# Patient Record
Sex: FEMALE | Race: WHITE | Employment: FULL TIME | URBAN - METROPOLITAN AREA
[De-identification: names, ages, dates, MRNs, and addresses within clinical notes are randomized per-mention and may not be internally consistent; named-entity substitution may affect disease eponyms.]

---

## 2022-03-18 PROBLEM — T74.91XA DOMESTIC VIOLENCE OF ADULT: Status: ACTIVE | Noted: 2021-04-14

## 2022-03-19 PROBLEM — N89.8 VAGINAL DISCHARGE: Status: ACTIVE | Noted: 2021-12-13

## 2022-03-19 PROBLEM — A63.0 CONDYLOMA ACUMINATA: Status: ACTIVE | Noted: 2017-08-01

## 2022-03-19 PROBLEM — R82.90 ABNORMAL URINE ODOR: Status: ACTIVE | Noted: 2021-12-13

## 2022-03-20 PROBLEM — K35.80 ACUTE APPENDICITIS: Status: ACTIVE | Noted: 2021-05-13

## 2022-03-20 PROBLEM — Z01.419 ENCOUNTER FOR ANNUAL ROUTINE GYNECOLOGICAL EXAMINATION: Status: ACTIVE | Noted: 2021-12-13

## 2022-07-14 PROBLEM — T83.32XA IUD STRINGS LOST: Status: ACTIVE | Noted: 2022-07-14

## 2022-07-14 PROBLEM — N94.89 UTERINE CRAMPING: Status: ACTIVE | Noted: 2022-07-14

## 2022-07-14 PROBLEM — R82.90 ABNORMAL URINE ODOR: Status: RESOLVED | Noted: 2021-12-13 | Resolved: 2022-07-14

## 2022-07-14 PROBLEM — N89.8 VAGINAL DISCHARGE: Status: RESOLVED | Noted: 2021-12-13 | Resolved: 2022-07-14

## 2022-08-24 DIAGNOSIS — O36.80X0 PREGNANCY WITH INCONCLUSIVE FETAL VIABILITY, SINGLE OR UNSPECIFIED FETUS: ICD-10-CM

## 2022-08-24 PROBLEM — N94.89 UTERINE CRAMPING: Status: RESOLVED | Noted: 2022-07-14 | Resolved: 2022-08-24

## 2022-08-24 LAB
ABO + RH BLD: NORMAL
BLOOD GROUP ANTIBODIES SERPL: NORMAL
ERYTHROCYTE [DISTWIDTH] IN BLOOD BY AUTOMATED COUNT: 12.7 % (ref 11.9–14.6)
HCG SERPL-ACNC: 5111 MIU/ML (ref 0–6)
HCT VFR BLD AUTO: 40.6 % (ref 35.8–46.3)
HGB BLD-MCNC: 13.2 G/DL (ref 11.7–15.4)
MCH RBC QN AUTO: 29.9 PG (ref 26.1–32.9)
MCHC RBC AUTO-ENTMCNC: 32.5 G/DL (ref 31.4–35)
MCV RBC AUTO: 92.1 FL (ref 79.6–97.8)
NRBC # BLD: 0 K/UL (ref 0–0.2)
PLATELET # BLD AUTO: 224 K/UL (ref 150–450)
PMV BLD AUTO: 11.4 FL (ref 9.4–12.3)
PROGEST SERPL-MCNC: 10.08 NG/ML
RBC # BLD AUTO: 4.41 M/UL (ref 4.05–5.2)
WBC # BLD AUTO: 8.6 K/UL (ref 4.3–11.1)

## 2022-08-26 DIAGNOSIS — O36.80X0 PREGNANCY WITH INCONCLUSIVE FETAL VIABILITY, SINGLE OR UNSPECIFIED FETUS: ICD-10-CM

## 2022-08-27 LAB — HCG SERPL-ACNC: 8019 MIU/ML (ref 0–6)

## 2022-08-29 DIAGNOSIS — Z34.90 EARLY STAGE OF PREGNANCY: ICD-10-CM

## 2022-08-29 LAB — HCG SERPL-ACNC: ABNORMAL MIU/ML (ref 0–6)

## 2022-09-01 DIAGNOSIS — Z34.81 MULTIGRAVIDA IN FIRST TRIMESTER: ICD-10-CM

## 2022-09-01 PROBLEM — K35.80 ACUTE APPENDICITIS: Status: RESOLVED | Noted: 2021-05-13 | Resolved: 2022-09-01

## 2022-09-01 PROBLEM — T74.91XA DOMESTIC VIOLENCE OF ADULT: Status: RESOLVED | Noted: 2021-04-14 | Resolved: 2022-09-01

## 2022-09-01 PROBLEM — A63.0 CONDYLOMA ACUMINATA: Status: RESOLVED | Noted: 2017-08-01 | Resolved: 2022-09-01

## 2022-09-01 PROBLEM — Z01.419 ENCOUNTER FOR ANNUAL ROUTINE GYNECOLOGICAL EXAMINATION: Status: RESOLVED | Noted: 2021-12-13 | Resolved: 2022-09-01

## 2022-09-01 PROBLEM — O26.31: Status: ACTIVE | Noted: 2022-08-24

## 2022-09-01 LAB
HBV SURFACE AG SER QL: NONREACTIVE
HCV AB SER QL: NONREACTIVE
RUBV IGG SERPL IA-ACNC: 101.2 IU/ML (ref 0–50)

## 2022-09-02 LAB
AMPHET UR QL SCN: NEGATIVE
BARBITURATES UR QL SCN: NEGATIVE
BENZODIAZ UR QL: NEGATIVE
CANNABINOIDS UR QL SCN: NEGATIVE
COCAINE UR QL SCN: NEGATIVE
EST. AVERAGE GLUCOSE BLD GHB EST-MCNC: 91 MG/DL
HBA1C MFR BLD: 4.8 % (ref 4.8–5.6)
HIV 1+2 AB+HIV1 P24 AG SERPL QL IA: NONREACTIVE
HIV 1/2 RESULT COMMENT: NORMAL
METHADONE UR QL: NEGATIVE
OPIATES UR QL: NEGATIVE
PCP UR QL: NEGATIVE
RPR SER QL: NONREACTIVE

## 2022-09-05 LAB
HGB A MFR BLD: 97.7 % (ref 96.4–98.8)
HGB A2 MFR BLD COLUMN CHROM: 2.3 % (ref 1.8–3.2)
HGB F MFR BLD: 0 % (ref 0–2)
HGB FRACT BLD-IMP: NORMAL
HGB S MFR BLD: 0 %

## 2022-09-29 PROBLEM — Z87.59 HISTORY OF DELIVERY OF MACROSOMAL INFANT: Status: ACTIVE | Noted: 2022-09-29

## 2022-10-04 PROBLEM — N89.8 VAGINAL DISCHARGE DURING PREGNANCY IN FIRST TRIMESTER: Status: ACTIVE | Noted: 2022-10-04

## 2022-10-04 PROBLEM — O26.891 VAGINAL DISCHARGE DURING PREGNANCY IN FIRST TRIMESTER: Status: ACTIVE | Noted: 2022-10-04

## 2022-10-26 ENCOUNTER — TELEPHONE (OUTPATIENT)
Dept: OBGYN CLINIC | Age: 22
End: 2022-10-26

## 2022-10-26 RX ORDER — FLUCONAZOLE 150 MG/1
150 TABLET ORAL
Qty: 2 TABLET | Refills: 0 | Status: SHIPPED | OUTPATIENT
Start: 2022-10-26 | End: 2022-10-30

## 2022-10-26 NOTE — TELEPHONE ENCOUNTER
Patient sent message below:    Hi , I have used all of my cream and a week of monistat as suggested by Amrita and it hasnt went fully away yet. It keeps occuring. Amrita said that once Im in my second trimester I may be able to just take diflucan because the symptoms werent subsided a week ago either when she called me. Should I make an appointment to be seen?

## 2022-11-03 ENCOUNTER — ROUTINE PRENATAL (OUTPATIENT)
Dept: OBGYN CLINIC | Age: 22
End: 2022-11-03
Payer: COMMERCIAL

## 2022-11-03 VITALS
HEIGHT: 66 IN | SYSTOLIC BLOOD PRESSURE: 110 MMHG | WEIGHT: 160 LBS | BODY MASS INDEX: 25.71 KG/M2 | DIASTOLIC BLOOD PRESSURE: 72 MMHG

## 2022-11-03 DIAGNOSIS — O26.32 RETAINED INTRAUTERINE DEVICE (IUD) DURING PREGNANCY IN SECOND TRIMESTER: ICD-10-CM

## 2022-11-03 DIAGNOSIS — Z34.82 MULTIGRAVIDA IN SECOND TRIMESTER: ICD-10-CM

## 2022-11-03 DIAGNOSIS — Z34.82 MULTIGRAVIDA IN SECOND TRIMESTER: Primary | ICD-10-CM

## 2022-11-03 DIAGNOSIS — Z87.59 HISTORY OF DELIVERY OF MACROSOMAL INFANT: ICD-10-CM

## 2022-11-03 PROCEDURE — 99213 OFFICE O/P EST LOW 20 MIN: CPT | Performed by: OBSTETRICS & GYNECOLOGY

## 2022-11-03 ASSESSMENT — PATIENT HEALTH QUESTIONNAIRE - PHQ9
2. FEELING DOWN, DEPRESSED OR HOPELESS: 0
SUM OF ALL RESPONSES TO PHQ QUESTIONS 1-9: 0
SUM OF ALL RESPONSES TO PHQ9 QUESTIONS 1 & 2: 0
SUM OF ALL RESPONSES TO PHQ QUESTIONS 1-9: 0
1. LITTLE INTEREST OR PLEASURE IN DOING THINGS: 0

## 2022-11-03 NOTE — PROGRESS NOTES
Chief Complaint   Patient presents with    Routine Prenatal Visit        This 25 y.o. [id number] at 15w1d with Estimated Date of Delivery: 4/26/23 presents for routine prenatal visit. Patient has no complaints today. Pt reports good FM, no LOF, VB, ctx. Pt denies H/A, vision changes, abdom pain, N/V. Vitals:    11/03/22 0842   BP: 110/72   Site: Left Upper Arm   Position: Sitting   Weight: 160 lb (72.6 kg)   Height: 5' 6\" (1.676 m)        Patient Active Problem List    Diagnosis Date Noted    History of delivery of macrosomal infant 09/29/2022     Priority: Medium     Overview Note:     G1 -- 10+ lbs, G2 - 8+ lbs    PLAN:  Serial growth in 3rd trimester       Assessment & Plan Note:     noted      Retained intrauterine device (IUD) during pregnancy in second trimester 08/24/2022     Priority: Medium     Overview Note:     8/24/2022: US today reveals 5w5d IUP, +GS, +YS, + FP, no fetal cardiac activity  Mirena IUD noted in posterior lateral part of uterus, ?perforation of arm of IUD    9/1/2022: viability confirmed, given IUD strings lost and ?perforation plan IUD to remain in situ. D/W Dr Nenita Nair Note:     noted     Ceci Mendez in second trimester 09/01/2022     Overview Note:     EDC by 6 1/7 week US -- unknown LMP with IUD in-situ    5/9/2019: CF/SMA negative (prev pregnancy)  09/29/22: NIPT negx3, female       Assessment & Plan Note:     Instructed pt to contact the office or seek immediate care if develops fever > 101.0, severe lower abdominal pain or heavy vaginal bleeding (soaking 2 or more pads per hour). D/W pt at length genetic testing that is recommended by ACOG -- NIPT, Quad screen (for trisomies and NTD), CF, SMA. Brief discussion of these diseases - conditions that may increase risks, etiology, carrier states, fetal effects, treatment options, etc was undertaken. D/W pt that these are screening tests only and are NOT mandatory.  It is her decision whether to have them done and how to proceed with the information afterwards. All questions answered, pt understood and wishes to proceed with indicated tests. Problem List Items Addressed This Visit        Other    Retained intrauterine device (IUD) during pregnancy in second trimester     noted         History of delivery of macrosomal infant     noted         Multigravida in second trimester - Primary     Instructed pt to contact the office or seek immediate care if develops fever > 101.0, severe lower abdominal pain or heavy vaginal bleeding (soaking 2 or more pads per hour). D/W pt at length genetic testing that is recommended by ACOG -- NIPT, Quad screen (for trisomies and NTD), CF, SMA. Brief discussion of these diseases - conditions that may increase risks, etiology, carrier states, fetal effects, treatment options, etc was undertaken. D/W pt that these are screening tests only and are NOT mandatory. It is her decision whether to have them done and how to proceed with the information afterwards. All questions answered, pt understood and wishes to proceed with indicated tests.           Relevant Orders    Alpha Fetoprotein, Maternal        Jeri Green MD     8:56 AM    11/03/22

## 2022-11-03 NOTE — PROGRESS NOTES
Patient comes in today for routine prenatal visit. No complaints/concerns today.      Fetal Movement: No  Contractions: No  Vaginal Bleeding: No  Leaking Fluid: No  GI/: No    Vitals:    11/03/22 0842   BP: 110/72   Site: Left Upper Arm   Position: Sitting   Weight: 160 lb (72.6 kg)   Height: 5' 6\" (1.676 m)

## 2022-11-05 LAB
AFP INTERP SERPL-IMP: NORMAL
AFP MOM SERPL: 1.16
AFP SERPL-MCNC: 34.7 NG/ML
AGE AT DELIVERY: 22.9 YR
COMMENT: NORMAL
GA METHOD: NORMAL
GA: 15.1 WEEKS
IDDM PATIENT QL: NO
Lab: NORMAL
MAT SCN FOR FETAL ABNORMALITIES SERPL: NORMAL
MULTIPLE PREGNANCY: NO
NEURAL TUBE DEFECT RISK FETUS: 7366 %

## 2022-11-07 ENCOUNTER — HOSPITAL ENCOUNTER (OUTPATIENT)
Age: 22
Discharge: HOME | End: 2022-11-07
Attending: OBSTETRICS & GYNECOLOGY | Admitting: OBSTETRICS & GYNECOLOGY
Payer: COMMERCIAL

## 2022-11-07 VITALS
TEMPERATURE: 98 F | HEART RATE: 86 BPM | RESPIRATION RATE: 16 BRPM | DIASTOLIC BLOOD PRESSURE: 67 MMHG | SYSTOLIC BLOOD PRESSURE: 117 MMHG

## 2022-11-07 LAB
SERVICE CMNT-IMP: NORMAL
WET PREP GENITAL: NORMAL

## 2022-11-07 PROCEDURE — 4500000201 HC EMERGENCY DEPT VISIT NO LEVEL OF CARE: Performed by: OBSTETRICS & GYNECOLOGY

## 2022-11-07 PROCEDURE — 99282 EMERGENCY DEPT VISIT SF MDM: CPT

## 2022-11-07 PROCEDURE — 87210 SMEAR WET MOUNT SALINE/INK: CPT

## 2022-11-07 RX ORDER — NITROFURANTOIN 25; 75 MG/1; MG/1
100 CAPSULE ORAL 2 TIMES DAILY
Qty: 20 CAPSULE | Refills: 0 | Status: SHIPPED | OUTPATIENT
Start: 2022-11-07 | End: 2022-11-17

## 2022-11-07 NOTE — PROGRESS NOTES
Pt to [facility] triage with c/o irregular discharge with bloody tinge last night. MD notified of arrival. Doppler heart tones noted, see flowsheet.

## 2022-11-07 NOTE — H&P
History & Physical    Name: Carley Deal MRN: [id number]  SSN: [id number]    YOB: 2000  Age: 25 y.o. Sex: female      Subjective:     Reason for Triage visit:  15w5d and spotting last night    History of Present Illness: Ms. Lencho Bowens is a 25 y.o.  female with an estimated gestational age of 16w9d with Estimated Date of Delivery: 23. Patient states that she had some pink discharge last pm.  This has resolved today. Some dysuria. Recent yeast infection. Pregnancy has been complicated by macrosomia babies. Patient denies abdominal pain  , chest pain, contractions, fever, headache , nausea and vomiting, pelvic pressure, right upper quadrant pain  , shortness of breath, swelling, vaginal leaking of fluid , and visual disturbances. OB History    Para Term  AB Living   4 2 2   1 2   SAB IAB Ectopic Molar Multiple Live Births   1         2      # Outcome Date GA Lbr Олег/2nd Weight Sex Delivery Anes PTL Lv   4 Current            3 SAB 2021           2 Term 19 40w3d  8 lb 11.9 oz (3.965 kg) F Vag-Spont EPI N ANG   1 Term 10/14/17 41w0d / 01:00 10 lb 13.9 oz (4.93 kg) M Vag-Spont  N ANG      Complications: Post-term pregnancy, 40-42 weeks of gestation     Past Medical History:   Diagnosis Date    ADHD (attention deficit hyperactivity disorder) 2015    Anemia during pregnancy in third trimester 2019    Condyloma acuminata 2017    noted     Depressive disorder 2015    H/O seasonal allergies     Syncope and collapse 2015     Past Surgical History:   Procedure Laterality Date    APPENDECTOMY       Social History     Occupational History    Not on file   Tobacco Use    Smoking status: Never    Smokeless tobacco: Never   Substance and Sexual Activity    Alcohol use: No    Drug use: No    Sexual activity: Yes     Birth control/protection: I.U.D.       Family History   Problem Relation Age of Onset    Colon Cancer Neg Hx     Uterine Cancer Neg Hx Ovarian Cancer Neg Hx     Breast Cancer Neg Hx     No Known Problems Sister     Cancer Paternal Grandmother         brain    No Known Problems Maternal Grandmother     Depression Mother     Anxiety Disorder Mother     Other Mother         lymphoma    Seizures Father     Seizures Sister     Muscular Dystrophy Mother         diagnosed at age 17yr       Allergies   Allergen Reactions    Latex Rash     Prior to Admission medications    Medication Sig Start Date End Date Taking? Authorizing Provider   ondansetron (ZOFRAN-ODT) 4 MG disintegrating tablet Take 1 tablet by mouth 3 times daily as needed for Nausea or Vomiting  Patient not taking: Reported on 11/3/2022 9/29/22   Graham Ruvalcaba MD   docusate sodium (COLACE) 100 MG capsule Take 1 capsule by mouth 2 times daily  Patient not taking: Reported on 11/3/2022 8/24/22   ISAÍAS Fiore CNP   Prenatal Vit-Fe Fumarate-FA (PRENATAL VITAMIN) 27-0.8 MG TABS Take 1 tablet by mouth daily 22   ISAÍAS Fiore CNP   levonorgestrel [facility]) IUD 52 mg 1 Device by IntraUTERine route once    Ar Automatic Reconciliation        Review of Systems:  Complete review of systems performed. Those not specifically mentioned in the HPI are either negative are non related to this patient encounter. Objective:     Vitals:    Vitals:    22 0853   BP: 117/67   Pulse: 86   Resp: 16   Temp: 98 °F (36.7 °C)   TempSrc: Oral      Temp (24hrs), Av °F (36.7 °C), Min:98 °F (36.7 °C), Max:98 °F (36.7 °C)    BP  Min: 117/67  Max: 117/67       Physical Exam:  Heart: RRR  Lungs: cta bl  Adb: soft, nt nd, gravid  Ext: no edema noted  CVX: closed/ thick/firm  Membranes:  Intact  Uterine Activity:  None  Fetal Heart Rate:  140s       Lab/Data Review:  No results found for this or any previous visit (from the past 24 hour(s)). Assessment and Plan:   15w5d with some pink spotting last night, now fully resolved. Nitrates, LE bac on udip.   Will collect wet prep, send urine for culture. Macrobid. Labor precautions given. Patient was told to return to LD immediately if she experiences contractions in a pattern, loss of fluids, vaginal bleeding or decreased Fetal movement.

## 2022-11-17 ENCOUNTER — PATIENT MESSAGE (OUTPATIENT)
Dept: OBGYN CLINIC | Age: 22
End: 2022-11-17

## 2022-11-17 NOTE — LETTER
[facility]  [address] [place] [address]  Phone: [phone]  Fax: [phone]    Wilda Gomez MD        November 18, 2022     Patient: Mercedes Branham   YOB: 2000   Date of Visit: 11/17/2022       To Whom It May Concern:    Lara Mohamud is a current patient at [address] for her pregnancy. She has a estimated due date of 04/26/2023. Ms. Marcio Carcamo has been medically advised that she is to work 8 hours per day no more than 40 hours per week. Please refer to the pregnancy guidelines that is attached to this letter. If you have any questions or concerns, please don't hesitate to call.     Sincerely,    Wilda Gomez MD

## 2022-11-18 NOTE — TELEPHONE ENCOUNTER
From: Robert Batista  To: Dr. Terrance Ordonez: 11/17/2022 7:22 AM EST  Subject: Accommodations at my job    Hey, I just wanted to wait to talk to you at my next appointment about work related tasks that are getting difficult but Mac Dus been having worsening pain in my lower back, stomach and constant pressure in my vaginal area, and problems with getting lightheaded even though Im eating good, and constantly drinking water. I am consistently bending and lifting boxes that are 40-60 pounds, or assisting taking 200-300 pound carts down a ramp. Im on my feet for 10 hours a day with 2 15 min breaks. This only happens at work and subsides at home on days off. I was wondering if you could write a letter asking my HR department for light duty, or something to make me more comfortable at work. I try to avoid lifting when I can but theyve recently given me problems and said that Im required to get an accommodation through my doctor.

## 2022-12-06 ENCOUNTER — ROUTINE PRENATAL (OUTPATIENT)
Dept: OBGYN CLINIC | Age: 22
End: 2022-12-06
Payer: COMMERCIAL

## 2022-12-06 VITALS
DIASTOLIC BLOOD PRESSURE: 76 MMHG | HEIGHT: 66 IN | SYSTOLIC BLOOD PRESSURE: 114 MMHG | WEIGHT: 167 LBS | BODY MASS INDEX: 26.84 KG/M2

## 2022-12-06 DIAGNOSIS — Z34.82 MULTIGRAVIDA IN SECOND TRIMESTER: ICD-10-CM

## 2022-12-06 DIAGNOSIS — O36.62X0 EXCESSIVE FETAL GROWTH AFFECTING MANAGEMENT OF PREGNANCY IN SECOND TRIMESTER, SINGLE OR UNSPECIFIED FETUS: ICD-10-CM

## 2022-12-06 DIAGNOSIS — O35.BXX1 FETAL VENTRICULAR SEPTAL DEFECT AFFECTING ANTEPARTUM CARE OF MOTHER, FETUS 1: ICD-10-CM

## 2022-12-06 DIAGNOSIS — Z87.59 HISTORY OF DELIVERY OF MACROSOMAL INFANT: ICD-10-CM

## 2022-12-06 DIAGNOSIS — O26.32 RETAINED INTRAUTERINE DEVICE (IUD) DURING PREGNANCY IN SECOND TRIMESTER: ICD-10-CM

## 2022-12-06 DIAGNOSIS — Z36.89 ENCOUNTER FOR FETAL ANATOMIC SURVEY: Primary | ICD-10-CM

## 2022-12-06 PROCEDURE — 76805 OB US >/= 14 WKS SNGL FETUS: CPT | Performed by: NURSE PRACTITIONER

## 2022-12-06 PROCEDURE — 99213 OFFICE O/P EST LOW 20 MIN: CPT | Performed by: NURSE PRACTITIONER

## 2022-12-06 NOTE — PROGRESS NOTES
Patient comes in today for routine prenatal visit. No complaints/concerns today.      Fetal Movement: Yes  Contractions: No  Vaginal Bleeding: No  Leaking Fluid: No  GI/: No    Vitals:    12/06/22 1359   BP: 114/76   Site: Left Upper Arm   Position: Sitting   Weight: 167 lb (75.8 kg)   Height: 5' 6\" (1.676 m)

## 2022-12-06 NOTE — ASSESSMENT & PLAN NOTE
PTL/labor precautions, [address], and pregnancy warning signs reviewed. Pt advised to call the office at [phone] or go straight to Labor and Delivery at [facility] with any of the following concerns vaginal bleeding, leaking of fluid, zeeshan regularly Q 5-7 minutes for over an hour or not feeling the baby move.    RTO 4 weeks OBV with US

## 2022-12-06 NOTE — PROGRESS NOTES
This is a 25 y.o.  [id number] at 19w6d for routine OB visit. Her Estimated Due Date is 2023, by Ultrasound    Denies leaking of fluid, vaginal bleeding, or regular contractions. Reports fetal movement. Current Outpatient Medications on File Prior to Visit   Medication Sig Dispense Refill    Prenatal Vit-Fe Fumarate-FA (PRENATAL VITAMIN) 27-0.8 MG TABS Take 1 tablet by mouth daily 90 tablet 3     No current facility-administered medications on file prior to visit.        Allergies   Allergen Reactions    Latex Rash       OB History    Para Term  AB Living   4 2 2 0 1 2   SAB IAB Ectopic Molar Multiple Live Births   1 0 0 0 0 2       # 1 - Date: 10/14/17, Sex: Male, Weight: 10 lb 13.9 oz (4.93 kg), GA: 41w0d, Delivery: Vaginal, Spontaneous, Apgar1: 8, Apgar5: 9, Living: Living, Birth Comments: None    # 2 - Date: 19, Sex: Female, Weight: 8 lb 11.9 oz (3.965 kg), GA: 40w3d, Delivery: Vaginal, Spontaneous, Apgar1: 9, Apgar5: 9, Living: Living, Birth Comments: None    # 3 - Date: 2021, Sex: None, Weight: None, GA: None, Delivery: None, Apgar1: None, Apgar5: None, Living: None, Birth Comments: None    # 4 - Date: None, Sex: None, Weight: None, GA: None, Delivery: None, Apgar1: None, Apgar5: None, Living: None, Birth Comments: None        Past Medical History:   Diagnosis Date    ADHD (attention deficit hyperactivity disorder) 2015    Anemia during pregnancy in third trimester 2019    Condyloma acuminata 2017    noted     Depressive disorder 2015    Fetal ventricular septal defect affecting antepartum care of mother, fetus 1 2022    H/O seasonal allergies     Syncope and collapse 2015       Past Surgical History:   Procedure Laterality Date    APPENDECTOMY         Family History   Problem Relation Age of Onset    Colon Cancer Neg Hx     Uterine Cancer Neg Hx     Ovarian Cancer Neg Hx     Breast Cancer Neg Hx     No Known Problems Sister    Valentine Reyes Cancer Paternal Grandmother         brain    No Known Problems Maternal Grandmother     Depression Mother     Anxiety Disorder Mother     Other Mother         lymphoma    Seizures Father     Seizures Sister     Muscular Dystrophy Mother         diagnosed at age 16yr       Social History     Socioeconomic History    Marital status:      Spouse name: Not on file    Number of children: Not on file    Years of education: Not on file    Highest education level: Not on file   Occupational History    Not on file   Tobacco Use    Smoking status: Never    Smokeless tobacco: Never   Substance and Sexual Activity    Alcohol use: No    Drug use: No    Sexual activity: Yes     Birth control/protection: I.U.D.    Other Topics Concern    Not on file   Social History Narrative    Abuse: Feels safe at home, no history of physical abuse, no history of sexual abuse  Lives with boyfriend       Social Determinants of Health     Financial Resource Strain: Not on file   Food Insecurity: Not on file   Transportation Needs: Not on file   Physical Activity: Not on file   Stress: Not on file   Social Connections: Not on file   Intimate Partner Violence: Not on file   Housing Stability: Not on file           Objective    Vitals:    12/06/22 1359   BP: 114/76   Site: Left Upper Arm   Position: Sitting   Weight: 167 lb (75.8 kg)   Height: 5' 6\" (1.676 m)       General: well developed, well nourished, in no acute distress    Head: normocephalic and atraumatic    Resp: even and unlabored    Psych: Normal mood and affect        Assessment and Plan      Patient Active Problem List    Diagnosis Date Noted    Fetal ventricular septal defect affecting antepartum care of mother, fetus 1 12/06/2022     Priority: Medium     Overview Note:     12/6/22: ? Small VSD, will recheck in 4 weeks       Assessment & Plan Note:     noted      Multigravida in second trimester 09/01/2022     Priority: Medium     Overview Note:     EDC by 6 1/7 week US -- unknown LMP with IUD in-situ    5/9/2019: CF/SMA negative (prev pregnancy)  09/29/22: NIPT negx3, female       Assessment & Plan Note:     PTL/labor precautions, [facility], and pregnancy warning signs reviewed. Pt advised to call the office at [phone] or go straight to Labor and Delivery at [facility] with any of the following concerns vaginal bleeding, leaking of fluid, zeeshan regularly Q 5-7 minutes for over an hour or not feeling the baby move. RTO 4 weeks OBV with US      Retained intrauterine device (IUD) during pregnancy in second trimester 08/24/2022     Priority: Medium     Overview Note:     8/24/2022: US today reveals 5w5d IUP, +GS, +YS, + FP, no fetal cardiac activity  Mirena IUD noted in posterior lateral part of uterus, ?perforation of arm of IUD    9/1/2022: viability confirmed, given IUD strings lost and ?perforation plan IUD to remain in situ. D/W Dr Subha Stoll History of delivery of macrosomal infant 09/29/2022     Overview Note:     G1 -- 10+ lbs, G2 - 8+ lbs    PLAN:  Serial growth in 3rd trimester    12/6/22: EFW >99%, AC >99%, HODA n, Cx nl, BREECH         Problem List Items Addressed This Visit        Circulatory    Fetal ventricular septal defect affecting antepartum care of mother, fetus 1     noted            Other    Multigravida in second trimester     PTL/labor precautions, [facility], and pregnancy warning signs reviewed. Pt advised to call the office at [phone] or go straight to Labor and Delivery at [facility] with any of the following concerns vaginal bleeding, leaking of fluid, zeeshan regularly Q 5-7 minutes for over an hour or not feeling the baby move.    RTO 4 weeks OBV with US         Relevant Orders    AMB POC US OB >= 14 WKS, 1ST GESTATION (Completed)    Retained intrauterine device (IUD) during pregnancy in second trimester    Relevant Orders    AMB POC US OB >= 14 WKS, 1ST GESTATION (Completed)    History of delivery of macrosomal infant    Relevant Orders    AMB POC US OB >= 14 WKS, 1ST GESTATION (Completed)   Other Visit Diagnoses     Encounter for fetal anatomic survey    -  Primary    Relevant Orders    AMB POC US OB >= 14 WKS, 1ST GESTATION (Completed)    Excessive fetal growth affecting management of pregnancy in second trimester, single or unspecified fetus        Relevant Orders    AMB POC US OB >= 14 WKS, 1ST GESTATION (Completed)          Orders Placed This Encounter   Procedures    AMB POC US OB >= 14 WKS, 1ST GESTATION       Outpatient Encounter Medications as of 12/6/2022   Medication Sig Dispense Refill    Prenatal Vit-Fe Fumarate-FA (PRENATAL VITAMIN) 27-0.8 MG TABS Take 1 tablet by mouth daily 90 tablet 3     No facility-administered encounter medications on file as of 12/6/2022.                Labor signs, pregnancy warning signs, and fetal movement counting reviewed (if applicable)        Pricila Haley NP, APRN - CNP 12/06/22 2:21 PM

## 2022-12-07 NOTE — PROGRESS NOTES
I have reviewed the patient's visit today including history, exam and assessment by MARC Mane. I agree with treatment/plan as above.     Jerman Gonzalez MD  10:56 AM  12/07/22

## 2022-12-30 ENCOUNTER — HOSPITAL ENCOUNTER (EMERGENCY)
Age: 22
Discharge: HOME | End: 2022-12-30
Attending: EMERGENCY MEDICINE
Payer: COMMERCIAL

## 2022-12-30 VITALS
RESPIRATION RATE: 21 BRPM | HEART RATE: 92 BPM | SYSTOLIC BLOOD PRESSURE: 107 MMHG | DIASTOLIC BLOOD PRESSURE: 60 MMHG | OXYGEN SATURATION: 99 % | TEMPERATURE: 98.2 F | WEIGHT: 170 LBS | BODY MASS INDEX: 27.32 KG/M2 | HEIGHT: 66 IN

## 2022-12-30 DIAGNOSIS — R55 SYNCOPE AND COLLAPSE: Primary | ICD-10-CM

## 2022-12-30 DIAGNOSIS — N39.0 URINARY TRACT INFECTION WITHOUT HEMATURIA, SITE UNSPECIFIED: ICD-10-CM

## 2022-12-30 DIAGNOSIS — E86.0 DEHYDRATION: ICD-10-CM

## 2022-12-30 LAB
ALBUMIN SERPL-MCNC: 2.7 G/DL (ref 3.5–5)
ALBUMIN/GLOB SERPL: 0.8 {RATIO} (ref 0.4–1.6)
ALP SERPL-CCNC: 69 U/L (ref 50–130)
ALT SERPL-CCNC: 16 U/L (ref 12–65)
ANION GAP SERPL CALC-SCNC: 7 MMOL/L (ref 2–11)
APPEARANCE UR: ABNORMAL
AST SERPL-CCNC: 11 U/L (ref 15–37)
BACTERIA URNS QL MICRO: ABNORMAL /HPF
BASOPHILS # BLD: 0 K/UL (ref 0–0.2)
BASOPHILS NFR BLD: 0 % (ref 0–2)
BILIRUB SERPL-MCNC: 0.3 MG/DL (ref 0.2–1.1)
BILIRUB UR QL: NEGATIVE
BUN SERPL-MCNC: 7 MG/DL (ref 6–23)
CALCIUM SERPL-MCNC: 8.9 MG/DL (ref 8.3–10.4)
CASTS URNS QL MICRO: ABNORMAL /LPF
CHLORIDE SERPL-SCNC: 109 MMOL/L (ref 101–110)
CO2 SERPL-SCNC: 25 MMOL/L (ref 21–32)
COLOR UR: ABNORMAL
CREAT SERPL-MCNC: 0.44 MG/DL (ref 0.6–1)
DIFFERENTIAL METHOD BLD: ABNORMAL
EOSINOPHIL # BLD: 0.2 K/UL (ref 0–0.8)
EOSINOPHIL NFR BLD: 3 % (ref 0.5–7.8)
EPI CELLS #/AREA URNS HPF: ABNORMAL /HPF
ERYTHROCYTE [DISTWIDTH] IN BLOOD BY AUTOMATED COUNT: 12.6 % (ref 11.9–14.6)
GLOBULIN SER CALC-MCNC: 3.5 G/DL (ref 2.8–4.5)
GLUCOSE SERPL-MCNC: 106 MG/DL (ref 65–100)
GLUCOSE UR STRIP.AUTO-MCNC: NEGATIVE MG/DL
HCT VFR BLD AUTO: 33.4 % (ref 35.8–46.3)
HGB BLD-MCNC: 11 G/DL (ref 11.7–15.4)
HGB UR QL STRIP: NEGATIVE
IMM GRANULOCYTES # BLD AUTO: 0 K/UL (ref 0–0.5)
IMM GRANULOCYTES NFR BLD AUTO: 0 % (ref 0–5)
KETONES UR QL STRIP.AUTO: NEGATIVE MG/DL
LEUKOCYTE ESTERASE UR QL STRIP.AUTO: ABNORMAL
LYMPHOCYTES # BLD: 2.1 K/UL (ref 0.5–4.6)
LYMPHOCYTES NFR BLD: 25 % (ref 13–44)
MCH RBC QN AUTO: 30.2 PG (ref 26.1–32.9)
MCHC RBC AUTO-ENTMCNC: 32.9 G/DL (ref 31.4–35)
MCV RBC AUTO: 91.8 FL (ref 82–102)
MONOCYTES # BLD: 0.3 K/UL (ref 0.1–1.3)
MONOCYTES NFR BLD: 4 % (ref 4–12)
NEUTS SEG # BLD: 5.7 K/UL (ref 1.7–8.2)
NEUTS SEG NFR BLD: 68 % (ref 43–78)
NITRITE UR QL STRIP.AUTO: NEGATIVE
NRBC # BLD: 0 K/UL (ref 0–0.2)
PH UR STRIP: 7.5 [PH] (ref 5–9)
PLATELET # BLD AUTO: 206 K/UL (ref 150–450)
PMV BLD AUTO: 10.7 FL (ref 9.4–12.3)
POTASSIUM SERPL-SCNC: 3.7 MMOL/L (ref 3.5–5.1)
PROT SERPL-MCNC: 6.2 G/DL (ref 6.3–8.2)
PROT UR STRIP-MCNC: NEGATIVE MG/DL
RBC # BLD AUTO: 3.64 M/UL (ref 4.05–5.2)
RBC #/AREA URNS HPF: ABNORMAL /HPF
SODIUM SERPL-SCNC: 141 MMOL/L (ref 133–143)
SP GR UR REFRACTOMETRY: 1.01 (ref 1–1.02)
UROBILINOGEN UR QL STRIP.AUTO: 1 EU/DL (ref 0.2–1)
WBC # BLD AUTO: 8.4 K/UL (ref 4.3–11.1)
WBC URNS QL MICRO: ABNORMAL /HPF

## 2022-12-30 PROCEDURE — 96361 HYDRATE IV INFUSION ADD-ON: CPT

## 2022-12-30 PROCEDURE — 81001 URINALYSIS AUTO W/SCOPE: CPT

## 2022-12-30 PROCEDURE — 96374 THER/PROPH/DIAG INJ IV PUSH: CPT

## 2022-12-30 PROCEDURE — 85025 COMPLETE CBC W/AUTO DIFF WBC: CPT

## 2022-12-30 PROCEDURE — 6370000000 HC RX 637 (ALT 250 FOR IP): Performed by: EMERGENCY MEDICINE

## 2022-12-30 PROCEDURE — 2580000003 HC RX 258: Performed by: EMERGENCY MEDICINE

## 2022-12-30 PROCEDURE — 6360000002 HC RX W HCPCS: Performed by: EMERGENCY MEDICINE

## 2022-12-30 PROCEDURE — 99284 EMERGENCY DEPT VISIT MOD MDM: CPT

## 2022-12-30 PROCEDURE — 87086 URINE CULTURE/COLONY COUNT: CPT

## 2022-12-30 PROCEDURE — 80053 COMPREHEN METABOLIC PANEL: CPT

## 2022-12-30 RX ORDER — CEPHALEXIN 500 MG/1
500 CAPSULE ORAL 3 TIMES DAILY
Qty: 21 CAPSULE | Refills: 0 | Status: SHIPPED | OUTPATIENT
Start: 2022-12-30 | End: 2023-01-06

## 2022-12-30 RX ORDER — ONDANSETRON 4 MG/1
4 TABLET, ORALLY DISINTEGRATING ORAL 3 TIMES DAILY PRN
Qty: 15 TABLET | Refills: 0 | Status: SHIPPED | OUTPATIENT
Start: 2022-12-30

## 2022-12-30 RX ORDER — ACETAMINOPHEN 500 MG
1000 TABLET ORAL
Status: COMPLETED | OUTPATIENT
Start: 2022-12-30 | End: 2022-12-30

## 2022-12-30 RX ORDER — 0.9 % SODIUM CHLORIDE 0.9 %
1000 INTRAVENOUS SOLUTION INTRAVENOUS
Status: COMPLETED | OUTPATIENT
Start: 2022-12-30 | End: 2022-12-30

## 2022-12-30 RX ORDER — ONDANSETRON 2 MG/ML
4 INJECTION INTRAMUSCULAR; INTRAVENOUS
Status: COMPLETED | OUTPATIENT
Start: 2022-12-30 | End: 2022-12-30

## 2022-12-30 RX ORDER — SODIUM CHLORIDE, SODIUM LACTATE, POTASSIUM CHLORIDE, AND CALCIUM CHLORIDE .6; .31; .03; .02 G/100ML; G/100ML; G/100ML; G/100ML
500 INJECTION, SOLUTION INTRAVENOUS
Status: COMPLETED | OUTPATIENT
Start: 2022-12-30 | End: 2022-12-30

## 2022-12-30 RX ORDER — CEPHALEXIN 500 MG/1
500 CAPSULE ORAL
Status: COMPLETED | OUTPATIENT
Start: 2022-12-30 | End: 2022-12-30

## 2022-12-30 RX ADMIN — SODIUM CHLORIDE, POTASSIUM CHLORIDE, SODIUM LACTATE AND CALCIUM CHLORIDE 500 ML: 600; 310; 30; 20 INJECTION, SOLUTION INTRAVENOUS at 16:57

## 2022-12-30 RX ADMIN — CEPHALEXIN 500 MG: 500 CAPSULE ORAL at 17:58

## 2022-12-30 RX ADMIN — ONDANSETRON 4 MG: 2 INJECTION INTRAMUSCULAR; INTRAVENOUS at 17:58

## 2022-12-30 RX ADMIN — ACETAMINOPHEN 1000 MG: 500 TABLET, FILM COATED ORAL at 17:58

## 2022-12-30 RX ADMIN — SODIUM CHLORIDE 1000 ML: 900 INJECTION, SOLUTION INTRAVENOUS at 14:59

## 2022-12-30 ASSESSMENT — VISUAL ACUITY
OU: 20/20
OS: 20/30
OD: 20/25

## 2022-12-30 ASSESSMENT — ENCOUNTER SYMPTOMS
SHORTNESS OF BREATH: 0
ABDOMINAL PAIN: 0
NAUSEA: 1
BACK PAIN: 0
RECTAL BLEEDING: 0
DIARRHEA: 0
DIFFICULTY BREATHING: 0
VOMITING: 0
VISUAL CHANGE: 1

## 2022-12-30 NOTE — ED TRIAGE NOTES
Patient ambulatory to triage. Patient c\o blurrred vision and dizziness that began 30 minutes ago. Patient states she is 24 weeks pregnant.

## 2022-12-30 NOTE — ED PROVIDER NOTES
Emergency Department Provider Note                   PCP:                On File Not (Inactive)               Age: 25 y.o. Sex: female       ICD-10-CM    1. Syncope and collapse  R55       2. Dehydration  E86.0       3. Urinary tract infection without hematuria, site unspecified  N39.0           DISPOSITION Decision To Discharge 12/30/2022 05:08:03 PM        MDM  Number of Diagnoses or Management Options  Dehydration: new, needed workup  Syncope and collapse: new, needed workup  Urinary tract infection without hematuria, site unspecified: new, needed workup     Amount and/or Complexity of Data Reviewed  Clinical lab tests: ordered and reviewed  Tests in the medicine section of CPT®: ordered and reviewed  Review and summarize past medical records: yes    Risk of Complications, Morbidity, and/or Mortality  Presenting problems: moderate  Diagnostic procedures: minimal  Management options: moderate    Patient Progress  Patient progress: improved                        ED Course as of 12/31/22 0034   Fri Dec 30, 2022   1533 ECG interpretation for ECG dated 30 December 2022 at 3:30 PM: ECG reveals a normal sinus rhythm at a rate of 86 bpm, normal MA and QTc intervals normal axis. Normal ECG. Bethel Oliveros MD   [BB]   1395 Patient still complains of generalized weakness as well as some nausea and mild headache. Denies any visual changes at present. Currently receiving second bolus of fluids.   Will give IV Zofran and Tylenol p.o. [BB]      ED Course User Index  [BB] Bethel Oliveros MD        Orders Placed This Encounter   Procedures    Culture, Urine    CBC with Auto Differential    Comprehensive Metabolic Panel    Urinalysis w rflx microscopic    EKG 12 Lead        Medications   0.9 % sodium chloride bolus (0 mLs IntraVENous Stopped 12/30/22 1600)   lactated ringers bolus (0 mLs IntraVENous Stopped 12/30/22 1827)   cephALEXin (KEFLEX) capsule 500 mg (500 mg Oral Given 12/30/22 1758)   ondansetron (Karl Lazar) injection 4 mg (4 mg IntraVENous Given 22)   acetaminophen (TYLENOL) tablet 1,000 mg (1,000 mg Oral Given 22)       Discharge Medication List as of 2022  6:19 PM        START taking these medications    Details   cephALEXin (KEFLEX) 500 MG capsule Take 1 capsule by mouth 3 times daily for 7 days, Disp-21 capsule, R-0Print      ondansetron (ZOFRAN-ODT) 4 MG disintegrating tablet Take 1 tablet by mouth 3 times daily as needed for Nausea or Vomiting, Disp-15 tablet, R-0Print              Desi Almodovar is a 25 y.o. female who presents to the Emergency Department with chief complaint of    Chief Complaint   Patient presents with    Dizziness      Per nurse's notes: \"Patient ambulatory to triage. Patient c\o blurrred vision and dizziness that began 30 minutes ago. Patient states she is 24 weeks pregnant. \"    24 yo  at 24 weeks presents with vision going dark while lying in bed at home as above, then on arrival here had syncopal episode in triage. Denies CP, SOB, LE edema. Felt a little nauseated prior to episode. No prior h/o syncope. The history is provided by the patient. Loss of Consciousness  Episode history:  Single  Most recent episode:   Today  Duration:  5 seconds  Timing:  Rare  Progression:  Improving  Chronicity:  New  Witnessed: yes    Relieved by:  Lying down  Worsened by:  Nothing  Ineffective treatments:  None tried  Associated symptoms: dizziness, nausea and visual change    Associated symptoms: no chest pain, no confusion, no diaphoresis, no difficulty breathing, no fever, no focal sensory loss, no focal weakness, no headaches, no malaise/fatigue, no palpitations, no recent fall, no recent injury, no recent surgery, no rectal bleeding, no seizures, no shortness of breath, no vomiting and no weakness    Risk factors: no congenital heart disease, no coronary artery disease, no seizures and no vascular disease        Review of Systems   Constitutional:  Positive for fatigue. Negative for activity change, appetite change, diaphoresis, fever and malaise/fatigue. Respiratory:  Negative for shortness of breath. Cardiovascular:  Positive for syncope. Negative for chest pain and palpitations. Gastrointestinal:  Positive for nausea. Negative for abdominal pain, diarrhea and vomiting. Musculoskeletal:  Negative for back pain. Neurological:  Positive for dizziness. Negative for focal weakness, seizures, weakness and headaches. Psychiatric/Behavioral:  Negative for confusion. All other systems reviewed and are negative. Past Medical History:   Diagnosis Date    ADHD (attention deficit hyperactivity disorder) 11/12/2015    Anemia during pregnancy in third trimester 7/11/2019    Condyloma acuminata 8/1/2017    noted     Depressive disorder 11/12/2015    Fetal ventricular septal defect affecting antepartum care of mother, fetus 1 12/6/2022    H/O seasonal allergies     Syncope and collapse 11/12/2015        Past Surgical History:   Procedure Laterality Date    APPENDECTOMY          Family History   Problem Relation Age of Onset    Colon Cancer Neg Hx     Uterine Cancer Neg Hx     Ovarian Cancer Neg Hx     Breast Cancer Neg Hx     No Known Problems Sister     Cancer Paternal Grandmother         brain    No Known Problems Maternal Grandmother     Depression Mother     Anxiety Disorder Mother     Other Mother         lymphoma    Seizures Father     Seizures Sister     Muscular Dystrophy Mother         diagnosed at age 17yr        Social History     Socioeconomic History    Marital status:    Tobacco Use    Smoking status: Never    Smokeless tobacco: Never   Substance and Sexual Activity    Alcohol use: No    Drug use: No    Sexual activity: Yes     Birth control/protection: I.U.D.    Social History Narrative    Abuse: Feels safe at home, no history of physical abuse, no history of sexual abuse  Lives with boyfriend           Latex     Discharge Medication List as of 12/30/2022  6:19 PM        CONTINUE these medications which have NOT CHANGED    Details   Prenatal Vit-Fe Fumarate-FA (PRENATAL VITAMIN) 27-0.8 MG TABS Take 1 tablet by mouth daily, Disp-90 tablet, R-3Normal              Vitals signs and nursing note reviewed. No data found. Physical Exam  Vitals and nursing note reviewed. Constitutional:       General: She is not in acute distress. HENT:      Head: Normocephalic and atraumatic. Right Ear: External ear normal.      Left Ear: External ear normal.      Nose: Nose normal.      Mouth/Throat:      Mouth: Mucous membranes are moist.   Eyes:      Extraocular Movements: Extraocular movements intact. Conjunctiva/sclera: Conjunctivae normal.      Pupils: Pupils are equal, round, and reactive to light. Cardiovascular:      Rate and Rhythm: Regular rhythm. Tachycardia present. Heart sounds: No murmur heard. Pulmonary:      Effort: Pulmonary effort is normal.      Breath sounds: Normal breath sounds. Abdominal:      General: Abdomen is flat. Palpations: There is no mass. Tenderness: There is no abdominal tenderness. There is no right CVA tenderness or left CVA tenderness. Musculoskeletal:         General: Normal range of motion. Cervical back: Normal range of motion and neck supple. Right lower leg: No edema. Left lower leg: No edema. Skin:     General: Skin is warm and dry. Neurological:      General: No focal deficit present. Mental Status: She is alert and oriented to person, place, and time. Psychiatric:         Mood and Affect: Mood and affect normal.         Speech: Speech normal.        Procedures    The patient was observed in the ED. patient was hydrated with 2 L of saline as well as given Zofran for nausea with significant improvement. She be given a dose of Keflex here urine was sent for culture and she will be discharged home on Keflex as well for her mild UTI.   Instructed follow-up with her OB doctor next week and return here sooner if worse.     Results Reviewed:      Recent Results (from the past 24 hour(s))   CBC with Auto Differential    Collection Time: 12/30/22  1:59 PM   Result Value Ref Range    WBC 8.4 4.3 - 11.1 K/uL    RBC 3.64 (L) 4.05 - 5.2 M/uL    Hemoglobin 11.0 (L) 11.7 - 15.4 g/dL    Hematocrit 33.4 (L) 35.8 - 46.3 %    MCV 91.8 82.0 - 102.0 FL    MCH 30.2 26.1 - 32.9 PG    MCHC 32.9 31.4 - 35.0 g/dL    RDW 12.6 11.9 - 14.6 %    Platelets 557 542 - 514 K/uL    MPV 10.7 9.4 - 12.3 FL    nRBC 0.00 0.0 - 0.2 K/uL    Differential Type AUTOMATED      Seg Neutrophils 68 43 - 78 %    Lymphocytes 25 13 - 44 %    Monocytes 4 4.0 - 12.0 %    Eosinophils % 3 0.5 - 7.8 %    Basophils 0 0.0 - 2.0 %    Immature Granulocytes 0 0.0 - 5.0 %    Segs Absolute 5.7 1.7 - 8.2 K/UL    Absolute Lymph # 2.1 0.5 - 4.6 K/UL    Absolute Mono # 0.3 0.1 - 1.3 K/UL    Absolute Eos # 0.2 0.0 - 0.8 K/UL    Basophils Absolute 0.0 0.0 - 0.2 K/UL    Absolute Immature Granulocyte 0.0 0.0 - 0.5 K/UL   Comprehensive Metabolic Panel    Collection Time: 12/30/22  1:59 PM   Result Value Ref Range    Sodium 141 133 - 143 mmol/L    Potassium 3.7 3.5 - 5.1 mmol/L    Chloride 109 101 - 110 mmol/L    CO2 25 21 - 32 mmol/L    Anion Gap 7 2 - 11 mmol/L    Glucose 106 (H) 65 - 100 mg/dL    BUN 7 6 - 23 MG/DL    Creatinine 0.44 (L) 0.6 - 1.0 MG/DL    Est, Glom Filt Rate >60 >60 ml/min/1.73m2    Calcium 8.9 8.3 - 10.4 MG/DL    Total Bilirubin 0.3 0.2 - 1.1 MG/DL    ALT 16 12 - 65 U/L    AST 11 (L) 15 - 37 U/L    Alk Phosphatase 69 50 - 130 U/L    Total Protein 6.2 (L) 6.3 - 8.2 g/dL    Albumin 2.7 (L) 3.5 - 5.0 g/dL    Globulin 3.5 2.8 - 4.5 g/dL    Albumin/Globulin Ratio 0.8 0.4 - 1.6     EKG 12 Lead    Collection Time: 12/30/22  3:30 PM   Result Value Ref Range    Ventricular Rate 86 BPM    Atrial Rate 86 BPM    P-R Interval 138 ms    QRS Duration 90 ms    Q-T Interval 358 ms    QTc Calculation (Bazett) 428 ms    P Axis 55 degrees    R Axis 87 degrees    T Axis 48 degrees    Diagnosis !! AGE AND GENDER SPECIFIC ECG ANALYSIS !! Urinalysis w rflx microscopic    Collection Time: 12/30/22  4:07 PM   Result Value Ref Range    Color, UA YELLOW/STRAW      Appearance CLOUDY      Specific [place], UA 1.010 1.001 - 1.023      pH, Urine 7.5 5.0 - 9.0      Protein, UA Negative NEG mg/dL    Glucose, UA Negative mg/dL    Ketones, Urine Negative NEG mg/dL    Bilirubin Urine Negative NEG      Blood, Urine Negative NEG      Urobilinogen, Urine 1.0 0.2 - 1.0 EU/dL    Nitrite, Urine Negative NEG      Leukocyte Esterase, Urine LARGE (A) NEG      WBC, UA 10-20 (A) U4 /hpf    RBC, UA 0-5 U5 /hpf    Epithelial Cells UA 5-10 (A) U5 /hpf    BACTERIA, URINE 2+ (A) NEG /hpf    Casts 0-2 U2 /lpf       I discussed the results of all labs, procedures, radiographs, and treatments with the patient and available family. Treatment plan is agreed upon and the patient is ready for discharge. All voiced understanding of the discharge plan and medication instructions or changes as appropriate. Questions about treatment in the ED were answered. All were encouraged to return should symptoms worsen or new problems develop. Voice dictation software was used during the making of this note. This software is not perfect and grammatical and other typographical errors may be present. This note has not been completely proofread for errors.      Elaine Spencer MD  12/31/22 9067

## 2022-12-30 NOTE — ED NOTES
I have reviewed discharge instructions with the patient. The patient verbalized understanding. Patient left ED via Discharge Method: ambulatory to Home with self. Opportunity for questions and clarification provided. Patient given 2 scripts. To continue your aftercare when you leave the hospital, you may receive an automated call from our care team to check in on how you are doing. This is a free service and part of our promise to provide the best care and service to meet your aftercare needs.  If you have questions, or wish to unsubscribe from this service please call [phone]. Thank you for Choosing our [facility] Emergency Department.         Taylor Mae RN  12/30/22 8147

## 2022-12-31 LAB
EKG ATRIAL RATE: 86 BPM
EKG DIAGNOSIS: NORMAL
EKG P AXIS: 55 DEGREES
EKG P-R INTERVAL: 138 MS
EKG Q-T INTERVAL: 358 MS
EKG QRS DURATION: 90 MS
EKG QTC CALCULATION (BAZETT): 428 MS
EKG R AXIS: 87 DEGREES
EKG T AXIS: 48 DEGREES
EKG VENTRICULAR RATE: 86 BPM

## 2023-01-01 LAB
BACTERIA SPEC CULT: NORMAL
SERVICE CMNT-IMP: NORMAL

## 2023-01-02 LAB
BACTERIA SPEC CULT: NORMAL
SERVICE CMNT-IMP: NORMAL

## 2023-01-03 ENCOUNTER — ROUTINE PRENATAL (OUTPATIENT)
Dept: OBGYN CLINIC | Age: 23
End: 2023-01-03
Payer: COMMERCIAL

## 2023-01-03 VITALS
BODY MASS INDEX: 26.52 KG/M2 | DIASTOLIC BLOOD PRESSURE: 60 MMHG | SYSTOLIC BLOOD PRESSURE: 102 MMHG | WEIGHT: 165 LBS | HEIGHT: 66 IN

## 2023-01-03 DIAGNOSIS — O36.62X0 EXCESSIVE FETAL GROWTH AFFECTING MANAGEMENT OF PREGNANCY IN SECOND TRIMESTER, SINGLE OR UNSPECIFIED FETUS: Primary | ICD-10-CM

## 2023-01-03 DIAGNOSIS — Z34.82 MULTIGRAVIDA IN SECOND TRIMESTER: ICD-10-CM

## 2023-01-03 DIAGNOSIS — O26.32 RETAINED INTRAUTERINE DEVICE (IUD) DURING PREGNANCY IN SECOND TRIMESTER: ICD-10-CM

## 2023-01-03 DIAGNOSIS — Z87.59 HISTORY OF DELIVERY OF MACROSOMAL INFANT: ICD-10-CM

## 2023-01-03 DIAGNOSIS — O35.BXX1 FETAL VENTRICULAR SEPTAL DEFECT AFFECTING ANTEPARTUM CARE OF MOTHER, FETUS 1: ICD-10-CM

## 2023-01-03 PROCEDURE — 76816 OB US FOLLOW-UP PER FETUS: CPT | Performed by: OBSTETRICS & GYNECOLOGY

## 2023-01-03 PROCEDURE — 99213 OFFICE O/P EST LOW 20 MIN: CPT | Performed by: OBSTETRICS & GYNECOLOGY

## 2023-01-03 RX ORDER — DIAPER,BRIEF,INFANT-TODD,DISP
EACH MISCELLANEOUS
Qty: 30 G | Refills: 1 | Status: SHIPPED | OUTPATIENT
Start: 2023-01-03 | End: 2023-01-10

## 2023-01-03 NOTE — PROGRESS NOTES
Patient comes in today for routine prenatal visit. No complaints/concerns today.      Fetal Movement: Yes  Contractions: No  Vaginal Bleeding: No  Leaking Fluid: No  GI/: No    Vitals:    01/03/23 1046   BP: 102/60   Site: Left Upper Arm   Position: Sitting   Weight: 165 lb (74.8 kg)   Height: 5' 6\" (1.676 m)

## 2023-01-03 NOTE — PATIENT INSTRUCTIONS
Please limit your carbohydrate intake for the remainder of pregnancy. This includes any with sugar in it (sweets, desserts, etc.), breads, potatoes, rice and pasta. This will help with your babies growth and/or fluid. If you develop signs and symptoms of  labor including but not limited to regular uterine contractions every 5-7 minutes for 1 hour, vaginal bleeding or leakage of fluid please contact our office and/or seek immediate care. Thanks for coming to see us today and letting us take care of you!

## 2023-01-03 NOTE — ASSESSMENT & PLAN NOTE
D/W pt to attempt to limit carbohydrate intake for the remainder of pregnancy to help reduce potential excessive growth and/or HODA. D/W her that this includes any with sugar in it (sweets, desserts, etc.), breads, potatoes, rice and pasta.

## 2023-02-02 ENCOUNTER — ROUTINE PRENATAL (OUTPATIENT)
Dept: OBGYN CLINIC | Age: 23
End: 2023-02-02
Payer: COMMERCIAL

## 2023-02-02 VITALS
HEIGHT: 66 IN | WEIGHT: 172 LBS | BODY MASS INDEX: 27.64 KG/M2 | DIASTOLIC BLOOD PRESSURE: 72 MMHG | SYSTOLIC BLOOD PRESSURE: 124 MMHG

## 2023-02-02 DIAGNOSIS — N94.89 LABIAL SWELLING: ICD-10-CM

## 2023-02-02 DIAGNOSIS — O35.BXX1 FETAL VENTRICULAR SEPTAL DEFECT AFFECTING ANTEPARTUM CARE OF MOTHER, FETUS 1: ICD-10-CM

## 2023-02-02 DIAGNOSIS — Z87.59 HISTORY OF DELIVERY OF MACROSOMAL INFANT: ICD-10-CM

## 2023-02-02 DIAGNOSIS — N89.8 VAGINAL DISCHARGE: ICD-10-CM

## 2023-02-02 DIAGNOSIS — Z34.83 MULTIGRAVIDA IN THIRD TRIMESTER: Primary | ICD-10-CM

## 2023-02-02 DIAGNOSIS — Z34.83 MULTIGRAVIDA IN THIRD TRIMESTER: ICD-10-CM

## 2023-02-02 LAB
ERYTHROCYTE [DISTWIDTH] IN BLOOD BY AUTOMATED COUNT: 12.4 % (ref 11.9–14.6)
GLUCOSE 1 HOUR: 97 MG/DL
HCT VFR BLD AUTO: 31.6 % (ref 35.8–46.3)
HGB BLD-MCNC: 10 G/DL (ref 11.7–15.4)
MCH RBC QN AUTO: 28.9 PG (ref 26.1–32.9)
MCHC RBC AUTO-ENTMCNC: 31.6 G/DL (ref 31.4–35)
MCV RBC AUTO: 91.3 FL (ref 82–102)
NRBC # BLD: 0 K/UL (ref 0–0.2)
PLATELET # BLD AUTO: 182 K/UL (ref 150–450)
PMV BLD AUTO: 11.4 FL (ref 9.4–12.3)
RBC # BLD AUTO: 3.46 M/UL (ref 4.05–5.2)
WBC # BLD AUTO: 8.1 K/UL (ref 4.3–11.1)

## 2023-02-02 PROCEDURE — 99213 OFFICE O/P EST LOW 20 MIN: CPT | Performed by: NURSE PRACTITIONER

## 2023-02-02 SDOH — ECONOMIC STABILITY: FOOD INSECURITY: WITHIN THE PAST 12 MONTHS, YOU WORRIED THAT YOUR FOOD WOULD RUN OUT BEFORE YOU GOT MONEY TO BUY MORE.: NEVER TRUE

## 2023-02-02 SDOH — ECONOMIC STABILITY: HOUSING INSECURITY
IN THE LAST 12 MONTHS, WAS THERE A TIME WHEN YOU DID NOT HAVE A STEADY PLACE TO SLEEP OR SLEPT IN A SHELTER (INCLUDING NOW)?: NO

## 2023-02-02 SDOH — ECONOMIC STABILITY: INCOME INSECURITY: HOW HARD IS IT FOR YOU TO PAY FOR THE VERY BASICS LIKE FOOD, HOUSING, MEDICAL CARE, AND HEATING?: NOT HARD AT ALL

## 2023-02-02 SDOH — ECONOMIC STABILITY: FOOD INSECURITY: WITHIN THE PAST 12 MONTHS, THE FOOD YOU BOUGHT JUST DIDN'T LAST AND YOU DIDN'T HAVE MONEY TO GET MORE.: NEVER TRUE

## 2023-02-02 ASSESSMENT — PATIENT HEALTH QUESTIONNAIRE - PHQ9
1. LITTLE INTEREST OR PLEASURE IN DOING THINGS: 0
SUM OF ALL RESPONSES TO PHQ9 QUESTIONS 1 & 2: 0
SUM OF ALL RESPONSES TO PHQ QUESTIONS 1-9: 0
2. FEELING DOWN, DEPRESSED OR HOPELESS: 0

## 2023-02-02 NOTE — ASSESSMENT & PLAN NOTE
PTL/labor precautions, [address], and pregnancy warning signs reviewed. Pt advised to call the office at [phone] or go straight to Labor and Delivery at [facility] with any of the following concerns vaginal bleeding, leaking of fluid, zeeshan regularly Q 5-7 minutes for over an hour or not feeling the baby move.    RTO 2 weeks OBV/US  Glucola, cbc today

## 2023-02-02 NOTE — PROGRESS NOTES
This is a 25 y.o.  [id number] at 28w1d for routine OB visit. Her Estimated Due Date is 2023, by Ultrasound    Denies leaking of fluid, vaginal bleeding, or regular contractions. Reports fetal movement. Pt c/o labial swelling, especially right side. Samuel discharge, itching or odor    Current Outpatient Medications on File Prior to Visit   Medication Sig Dispense Refill    Prenatal Vit-Fe Fumarate-FA (PRENATAL VITAMIN) 27-0.8 MG TABS Take 1 tablet by mouth daily 90 tablet 3    ondansetron (ZOFRAN-ODT) 4 MG disintegrating tablet Take 1 tablet by mouth 3 times daily as needed for Nausea or Vomiting (Patient not taking: Reported on 2023) 15 tablet 0     No current facility-administered medications on file prior to visit.        Allergies   Allergen Reactions    Latex Rash       OB History    Para Term  AB Living   4 2 2 0 1 2   SAB IAB Ectopic Molar Multiple Live Births   1 0 0 0 0 2       # 1 - Date: 10/14/17, Sex: Male, Weight: 10 lb 13.9 oz (4.93 kg), GA: 41w0d, Delivery: Vaginal, Spontaneous, Apgar1: 8, Apgar5: 9, Living: Living, Birth Comments: None    # 2 - Date: 19, Sex: Female, Weight: 8 lb 11.9 oz (3.965 kg), GA: 40w3d, Delivery: Vaginal, Spontaneous, Apgar1: 9, Apgar5: 9, Living: Living, Birth Comments: None    # 3 - Date: 2021, Sex: None, Weight: None, GA: None, Delivery: None, Apgar1: None, Apgar5: None, Living: None, Birth Comments: None    # 4 - Date: None, Sex: None, Weight: None, GA: None, Delivery: None, Apgar1: None, Apgar5: None, Living: None, Birth Comments: None        Past Medical History:   Diagnosis Date    ADHD (attention deficit hyperactivity disorder) 2015    Anemia during pregnancy in third trimester 2019    Condyloma acuminata 2017    noted     Depressive disorder 2015    Fetal ventricular septal defect affecting antepartum care of mother, fetus 1 2022    H/O seasonal allergies     Syncope and collapse 2015       Past Surgical History:   Procedure Laterality Date    APPENDECTOMY         Family History   Problem Relation Age of Onset    Colon Cancer Neg Hx     Uterine Cancer Neg Hx     Ovarian Cancer Neg Hx     Breast Cancer Neg Hx     No Known Problems Sister     Cancer Paternal Grandmother         brain    No Known Problems Maternal Grandmother     Depression Mother     Anxiety Disorder Mother     Other Mother         lymphoma    Seizures Father     Seizures Sister     Muscular Dystrophy Mother         diagnosed at age 17yr       Social History     Socioeconomic History    Marital status:      Spouse name: Not on file    Number of children: Not on file    Years of education: Not on file    Highest education level: Not on file   Occupational History    Not on file   Tobacco Use    Smoking status: Never    Smokeless tobacco: Never   Substance and Sexual Activity    Alcohol use: No    Drug use: No    Sexual activity: Yes     Birth control/protection: I.U.D. Other Topics Concern    Not on file   Social History Narrative    Abuse: Feels safe at home, no history of physical abuse, no history of sexual abuse  Lives with boyfriend       Social Determinants of Health     Financial Resource Strain: Low Risk     Difficulty of Paying Living Expenses: Not hard at all   Food Insecurity: No Food Insecurity    Worried About [address] in the Last Year: Never true    [address] in the Last Year: Never true   Transportation Needs: Unknown    Lack of Transportation (Medical): Not on file    Lack of Transportation (Non-Medical):  No   Physical Activity: Not on file   Stress: Not on file   Social Connections: Not on file   Intimate Partner Violence: Not on file   Housing Stability: Unknown    Unable to Pay for Housing in the Last Year: Not on file    Number of Places Lived in the Last Year: Not on file    Unstable Housing in the Last Year: No           Objective    Vitals:    02/02/23 0835   BP: 124/72   Site: Right Upper Arm Position: Sitting   Weight: 172 lb (78 kg)   Height: 5' 6\" (1.676 m)       General: well developed, well nourished, in no acute distress    Head: normocephalic and atraumatic    Resp: even and unlabored    Pelvic Exam:       External: normal female genitalia without lesions or masses,  mild labial swelling bilaterally      Vagina: normal without lesions or masses, white discharge noted      Cervix: normal without lesions or masses     Psych: Normal mood and affect        Assessment and Plan      Patient Active Problem List    Diagnosis Date Noted    Labial swelling 02/02/2023     Priority: Medium     Assessment & Plan Note:     We discuss nl variant in preg, mild labial varicose veins  Relief measures reviewed including ice, support belt, rest with legs elevated  nuswab collected      Fetal ventricular septal defect affecting antepartum care of mother, fetus 1 12/06/2022     Priority: Medium     Overview Note:     12/6/22: ? Small VSD, will recheck in 4 weeks  1/3/23:  EFW 98%, AC 98%, HODA 15.4 cm, vtx, no evidence of VSD       Assessment & Plan Note:     Noted, US next visit      Multigravida in third trimester 09/01/2022     Priority: Medium     Overview Note:     EDC by 6 1/7 week US -- unknown LMP with IUD in-situ    5/9/2019: CF/SMA negative (prev pregnancy)  09/29/22: NIPT negx3, female  2/2/23: declines tdap today       Assessment & Plan Note:     PTL/labor precautions, [facility], and pregnancy warning signs reviewed. Pt advised to call the office at [phone] or go straight to Labor and Delivery at [facility] with any of the following concerns vaginal bleeding, leaking of fluid, zeeshan regularly Q 5-7 minutes for over an hour or not feeling the baby move.    RTO 2 weeks OBV/US  Glucola, cbc today      Retained intrauterine device (IUD) during pregnancy in second trimester 08/24/2022     Priority: Medium     Overview Note:     8/24/2022: US today reveals 5w5d IUP, +GS, +YS, + FP, no fetal cardiac activity  Mirena IUD noted in posterior lateral part of uterus, ?perforation of arm of IUD    9/1/2022: viability confirmed, given IUD strings lost and ?perforation plan IUD to remain in situ. D/W Dr Canelo Mahmood      History of delivery of macrosomal infant 09/29/2022     Overview Note:     G1 -- 10+ lbs, G2 - 8+ lbs    PLAN:  Serial growth in 3rd trimester    12/6/22: EFW >99%, AC >99%, HODA n, Cx nl, BREECH  1/3/23:  EFW 98%, AC 98%, HODA 15.4 cm, vtx, no evidence of VSD       Assessment & Plan Note:     noted         Problem List Items Addressed This Visit          Circulatory    Fetal ventricular septal defect affecting antepartum care of mother, fetus 1     Noted, US next visit            Other    Labial swelling     We discuss nl variant in preg, mild labial varicose veins  Relief measures reviewed including ice, support belt, rest with legs elevated  nuswab collected         Multigravida in third trimester - Primary     PTL/labor precautions, [address], and pregnancy warning signs reviewed. Pt advised to call the office at [phone] or go straight to [facility] with any of the following concerns vaginal bleeding, leaking of fluid, zeeshan regularly Q 5-7 minutes for over an hour or not feeling the baby move.    RTO 2 weeks OBV/US  Glucola, cbc today         Relevant Orders    Glucose tolerance, 1 hour    CBC    Nuswab Vaginitis Plus (VG+)    History of delivery of macrosomal infant     noted          Other Visit Diagnoses       Vaginal discharge        Relevant Orders    Nuswab Vaginitis Plus (VG+)            Orders Placed This Encounter   Procedures    Nuswab Vaginitis Plus (VG+)    Glucose tolerance, 1 hour    CBC       Outpatient Encounter Medications as of 2/2/2023   Medication Sig Dispense Refill    Prenatal Vit-Fe Fumarate-FA (PRENATAL VITAMIN) 27-0.8 MG TABS Take 1 tablet by mouth daily 90 tablet 3    ondansetron (ZOFRAN-ODT) 4 MG disintegrating tablet Take 1 tablet by mouth 3 times daily as needed for Nausea or Vomiting (Patient not taking: Reported on 2/2/2023) 15 tablet 0     No facility-administered encounter medications on file as of 2/2/2023.                Labor signs, pregnancy warning signs, and fetal movement counting reviewed (if applicable)        Pricila Haley NP, APRN - CNP 02/02/23 8:56 AM

## 2023-02-02 NOTE — ASSESSMENT & PLAN NOTE
We discuss nl variant in preg, mild labial varicose veins  Relief measures reviewed including ice, support belt, rest with legs elevated  nuswab collected

## 2023-02-02 NOTE — PROGRESS NOTES
Patient comes in today for routine prenatal visit. No complaints/concerns for today. Patient finished glucola @ 8:43am.   Tdap education and VIS sheet given.      Fetal Movement: Yes  Contractions: No  Vaginal Bleeding: No  Leaking Fluid: No  GI/: No    Vitals:    02/02/23 0835   BP: 124/72   Site: Right Upper Arm   Position: Sitting   Weight: 172 lb (78 kg)   Height: 5' 6\" (1.676 m)

## 2023-02-03 ENCOUNTER — TELEPHONE (OUTPATIENT)
Dept: OBGYN CLINIC | Age: 23
End: 2023-02-03

## 2023-02-03 DIAGNOSIS — O99.013 ANEMIA AFFECTING PREGNANCY IN THIRD TRIMESTER: Primary | ICD-10-CM

## 2023-02-03 RX ORDER — FERROUS SULFATE 325(65) MG
325 TABLET ORAL 2 TIMES DAILY
Qty: 60 TABLET | Refills: 5 | Status: SHIPPED | OUTPATIENT
Start: 2023-02-03

## 2023-02-03 RX ORDER — DOCUSATE SODIUM 100 MG/1
100 CAPSULE, LIQUID FILLED ORAL 2 TIMES DAILY PRN
Qty: 60 CAPSULE | Refills: 0 | Status: SHIPPED | OUTPATIENT
Start: 2023-02-03 | End: 2023-03-05

## 2023-02-03 NOTE — TELEPHONE ENCOUNTER
Please call patient and let her know that her hemoglobin was low. She needs to start taking FeSO4 325mg PO BID. Also encourage foods that are high in iron. Patient can take Colace 100mg PO BID prn for constipation. Encourage an increase in fluids and foods high in fiber to prevent constipation.        Passed glucola

## 2023-02-03 NOTE — TELEPHONE ENCOUNTER
RN called patient, patient verified PHIs, RN updated patient that they did pass their glucola but that their hemoglobin was on the lower end and the recommendation is to start taking iron PO BID. Patient verbalized understanding and verified pharmacy for iron and colace to be sent to after RN educated patient that iron has a common side effect of constipation. Patient states no other thoughts, questions or concerns at this time.

## 2023-02-05 LAB
A VAGINAE DNA VAG QL NAA+PROBE: ABNORMAL SCORE
BVAB2 DNA VAG QL NAA+PROBE: ABNORMAL SCORE
C TRACH RRNA SPEC QL NAA+PROBE: POSITIVE
MEGA1 DNA VAG QL NAA+PROBE: ABNORMAL SCORE
N GONORRHOEA RRNA SPEC QL NAA+PROBE: NEGATIVE
SPECIMEN SOURCE: ABNORMAL
T VAGINALIS RRNA SPEC QL NAA+PROBE: NEGATIVE

## 2023-02-06 ENCOUNTER — TELEPHONE (OUTPATIENT)
Dept: OBGYN CLINIC | Age: 23
End: 2023-02-06

## 2023-02-06 DIAGNOSIS — B37.9 YEAST INFECTION: ICD-10-CM

## 2023-02-06 DIAGNOSIS — O98.813 CHLAMYDIA INFECTION AFFECTING PREGNANCY IN THIRD TRIMESTER: Primary | ICD-10-CM

## 2023-02-06 DIAGNOSIS — B37.9 YEAST DETECTED: ICD-10-CM

## 2023-02-06 DIAGNOSIS — A74.9 CHLAMYDIA INFECTION AFFECTING PREGNANCY IN THIRD TRIMESTER: Primary | ICD-10-CM

## 2023-02-06 RX ORDER — AZITHROMYCIN 500 MG/1
1000 TABLET, FILM COATED ORAL ONCE
Qty: 2 TABLET | Refills: 0 | Status: SHIPPED | OUTPATIENT
Start: 2023-02-06 | End: 2023-02-06

## 2023-02-06 NOTE — TELEPHONE ENCOUNTER
RN called patient, patient verified PHIs, RN updated patient on positive yeast and chlamydia results. RN educated patient on Rxs and asked patient to verified pharmacy. Patient verbalized understanding and verified pharmacy. RN educated patient that another swab will be collected during pregnancy to make sure that chlamydia has been tested. Rxs sent.

## 2023-02-06 NOTE — TELEPHONE ENCOUNTER
Patient tested positive for Chlamydia. This is a sexually transmitted infection. This can be treated with the following, if not allergic    Azithromycin 1gram PO Once, No Refills    Patient needs to notify partner so they can follow up with their PCP or [facility] for testing and/or treatment. If patient is pregnant, we will retest during her pregnancy. If not pregnant, please schedule patient for retest in 3 months. 2. Patient positive for yeast infection.  If having symptoms, can have one of the following for treatment, if not allergic      Terazol 7 Apply 1 applicator nightly for 7 nights, #7, No Refills

## 2023-02-17 ENCOUNTER — ROUTINE PRENATAL (OUTPATIENT)
Dept: OBGYN CLINIC | Age: 23
End: 2023-02-17

## 2023-02-17 VITALS
DIASTOLIC BLOOD PRESSURE: 80 MMHG | WEIGHT: 173 LBS | HEIGHT: 66 IN | SYSTOLIC BLOOD PRESSURE: 118 MMHG | BODY MASS INDEX: 27.8 KG/M2

## 2023-02-17 DIAGNOSIS — O36.63X0 MACROSOMIA OF FETUS AFFECTING MANAGEMENT OF MOTHER IN THIRD TRIMESTER, SINGLE OR UNSPECIFIED FETUS: Primary | ICD-10-CM

## 2023-02-17 DIAGNOSIS — A74.9 CHLAMYDIA INFECTION AFFECTING PREGNANCY IN THIRD TRIMESTER: ICD-10-CM

## 2023-02-17 DIAGNOSIS — O98.813 CHLAMYDIA INFECTION AFFECTING PREGNANCY IN THIRD TRIMESTER: ICD-10-CM

## 2023-02-17 DIAGNOSIS — Z87.59 HISTORY OF DELIVERY OF MACROSOMAL INFANT: ICD-10-CM

## 2023-02-17 DIAGNOSIS — O26.32 RETAINED INTRAUTERINE DEVICE (IUD) DURING PREGNANCY IN SECOND TRIMESTER: ICD-10-CM

## 2023-02-17 DIAGNOSIS — O35.BXX1 FETAL VENTRICULAR SEPTAL DEFECT AFFECTING ANTEPARTUM CARE OF MOTHER, FETUS 1: ICD-10-CM

## 2023-02-17 DIAGNOSIS — O99.013 ANEMIA AFFECTING PREGNANCY IN THIRD TRIMESTER: ICD-10-CM

## 2023-02-17 DIAGNOSIS — Z34.83 MULTIGRAVIDA IN THIRD TRIMESTER: ICD-10-CM

## 2023-02-17 ASSESSMENT — PATIENT HEALTH QUESTIONNAIRE - PHQ9
SUM OF ALL RESPONSES TO PHQ9 QUESTIONS 1 & 2: 0
SUM OF ALL RESPONSES TO PHQ QUESTIONS 1-9: 0
1. LITTLE INTEREST OR PLEASURE IN DOING THINGS: 0
2. FEELING DOWN, DEPRESSED OR HOPELESS: 0

## 2023-02-17 NOTE — PROGRESS NOTES
Patient comes in today for routine prenatal visit. No complaints/concerns today.      Fetal Movement: Yes  Contractions: No  Vaginal Bleeding: No  Leaking Fluid: No  GI/: No    Vitals:    02/17/23 0917   BP: 118/80   Site: Left Upper Arm   Position: Sitting   Weight: 173 lb (78.5 kg)   Height: 5' 6\" (1.676 m)

## 2023-02-17 NOTE — PROGRESS NOTES
This is a 25 y.o.  [id number] at 30w2d for routine OB visit. Her Estimated Due Date is 2023, by Ultrasound    Denies leaking of fluid, vaginal bleeding, or regular contractions. Reports fetal movement. Current Outpatient Medications on File Prior to Visit   Medication Sig Dispense Refill    ferrous sulfate (IRON 325) 325 (65 Fe) MG tablet Take 1 tablet by mouth 2 times daily 60 tablet 5    docusate sodium (COLACE) 100 MG capsule Take 1 capsule by mouth 2 times daily as needed for Constipation 60 capsule 0    ondansetron (ZOFRAN-ODT) 4 MG disintegrating tablet Take 1 tablet by mouth 3 times daily as needed for Nausea or Vomiting 15 tablet 0    Prenatal Vit-Fe Fumarate-FA (PRENATAL VITAMIN) 27-0.8 MG TABS Take 1 tablet by mouth daily 90 tablet 3     No current facility-administered medications on file prior to visit.        Allergies   Allergen Reactions    Latex Rash       OB History    Para Term  AB Living   4 2 2 0 1 2   SAB IAB Ectopic Molar Multiple Live Births   1 0 0 0 0 2       # 1 - Date: 10/14/17, Sex: Male, Weight: 10 lb 13.9 oz (4.93 kg), GA: 41w0d, Delivery: Vaginal, Spontaneous, Apgar1: 8, Apgar5: 9, Living: Living, Birth Comments: None    # 2 - Date: 19, Sex: Female, Weight: 8 lb 11.9 oz (3.965 kg), GA: 40w3d, Delivery: Vaginal, Spontaneous, Apgar1: 9, Apgar5: 9, Living: Living, Birth Comments: None    # 3 - Date: 2021, Sex: None, Weight: None, GA: None, Delivery: None, Apgar1: None, Apgar5: None, Living: None, Birth Comments: None    # 4 - Date: None, Sex: None, Weight: None, GA: None, Delivery: None, Apgar1: None, Apgar5: None, Living: None, Birth Comments: None        Past Medical History:   Diagnosis Date    ADHD (attention deficit hyperactivity disorder) 2015    Anemia during pregnancy in third trimester 2019    Condyloma acuminata 2017    noted     Depressive disorder 2015    Fetal ventricular septal defect affecting antepartum care of mother, fetus 1 12/6/2022    H/O seasonal allergies     Syncope and collapse 11/12/2015       Past Surgical History:   Procedure Laterality Date    APPENDECTOMY         Family History   Problem Relation Age of Onset    Colon Cancer Neg Hx     Uterine Cancer Neg Hx     Ovarian Cancer Neg Hx     Breast Cancer Neg Hx     No Known Problems Sister     Cancer Paternal Grandmother         brain    No Known Problems Maternal Grandmother     Depression Mother     Anxiety Disorder Mother     Other Mother         lymphoma    Seizures Father     Seizures Sister     Muscular Dystrophy Mother         diagnosed at age 16yr       Social History     Socioeconomic History    Marital status:      Spouse name: Not on file    Number of children: Not on file    Years of education: Not on file    Highest education level: Not on file   Occupational History    Not on file   Tobacco Use    Smoking status: Never    Smokeless tobacco: Never   Substance and Sexual Activity    Alcohol use: No    Drug use: No    Sexual activity: Yes     Birth control/protection: I.U.D. Other Topics Concern    Not on file   Social History Narrative    Abuse: Feels safe at home, no history of physical abuse, no history of sexual abuse  Lives with boyfriend       Social Determinants of Health     Financial Resource Strain: Low Risk     Difficulty of Paying Living Expenses: Not hard at all   Food Insecurity: No Food Insecurity    Worried About Running Out of Food in the Last Year: Never true    [address] in the Last Year: Never true   Transportation Needs: Unknown    Lack of Transportation (Medical): Not on file    Lack of Transportation (Non-Medical):  No   Physical Activity: Not on file   Stress: Not on file   Social Connections: Not on file   Intimate Partner Violence: Not on file   Housing Stability: Unknown    Unable to Pay for Housing in the Last Year: Not on file    Number of Places Lived in the Last Year: Not on file    Unstable Housing in the Last Year: No           Objective    Vitals:    02/17/23 0917   BP: 118/80   Site: Left Upper Arm   Position: Sitting   Weight: 173 lb (78.5 kg)   Height: 5' 6\" (1.676 m)       General: well developed, well nourished, in no acute distress    Head: normocephalic and atraumatic    Resp: even and unlabored    Psych: Normal mood and affect        Assessment and Plan      Patient Active Problem List    Diagnosis Date Noted    Chlamydia infection affecting pregnancy in third trimester 02/06/2023     Priority: Medium     Overview Note:     2/6/2023: Dx chlamydia, plan DIMITRY in 4 weeks       Assessment & Plan Note:     noted      Anemia affecting pregnancy in third trimester 02/03/2023     Priority: Medium     Overview Note:     Add'l iron       Assessment & Plan Note:     noted      Labial swelling 02/02/2023     Priority: Medium    Fetal ventricular septal defect affecting antepartum care of mother, fetus 1 12/06/2022     Priority: Medium     Overview Note:     12/6/22: ? Small VSD, will recheck in 4 weeks  1/3/23:  EFW 98%, AC 98%, HODA 15.4 cm, vtx, no evidence of VSD      Multigravida in third trimester 09/01/2022     Priority: Medium     Overview Note:     EDC by 6 1/7 week US -- unknown LMP with IUD in-situ    5/9/2019: CF/SMA negative (prev pregnancy)  09/29/22: NIPT negx3, female  2/2/23: declines tdap today       Assessment & Plan Note:     PTL/labor precautions, [facility], and pregnancy warning signs reviewed. Pt advised to call the office at [phone] or go straight to Labor and Delivery at [address] with any of the following concerns vaginal bleeding, leaking of fluid, zeeshan regularly Q 5-7 minutes for over an hour or not feeling the baby move.    RTO 2 weeks OBV      Retained intrauterine device (IUD) during pregnancy in second trimester 08/24/2022     Priority: Medium     Overview Note:     8/24/2022: US today reveals 5w5d IUP, +GS, +YS, + FP, no fetal cardiac activity  Mirena IUD noted in posterior lateral part of uterus, ?perforation of arm of IUD    9/1/2022: viability confirmed, given IUD strings lost and ?perforation plan IUD to remain in situ. D/W Dr Gabrielle Sagastume History of delivery of macrosomal infant 09/29/2022     Overview Note:     G1 -- 10+ lbs, G2 - 8+ lbs    PLAN:  Serial growth in 3rd trimester    12/6/22: EFW >99%, AC >99%, HODA n, Cx nl, BREECH  1/3/23:  EFW 98%, AC 98%, HODA 15.4 cm, vtx, no evidence of VSD  2/17/2023: EFW >99%, AC >99%, HODA nl, vertex EFW 5lb4oz         Problem List Items Addressed This Visit        Other    Anemia affecting pregnancy in third trimester     noted         Chlamydia infection affecting pregnancy in third trimester     noted         Multigravida in third trimester     PTL/labor precautions, [address], and pregnancy warning signs reviewed. Pt advised to call the office at [phone] or go straight to Labor and Delivery at [address] with any of the following concerns vaginal bleeding, leaking of fluid, zeeshan regularly Q 5-7 minutes for over an hour or not feeling the baby move.    RTO 2 weeks OBV         Relevant Orders    AMB POC US OB RE-EVAL/FOLLOW UP (Completed)    Retained intrauterine device (IUD) during pregnancy in second trimester    Relevant Orders    AMB POC US OB RE-EVAL/FOLLOW UP (Completed)    History of delivery of macrosomal infant    Relevant Orders    AMB POC US OB RE-EVAL/FOLLOW UP (Completed)   Other Visit Diagnoses     Macrosomia of fetus affecting management of mother in third trimester, single or unspecified fetus    -  Primary    Relevant Orders    AMB POC US OB RE-EVAL/FOLLOW UP (Completed)          Orders Placed This Encounter   Procedures    AMB POC US OB RE-EVAL/FOLLOW UP       Outpatient Encounter Medications as of 2/17/2023   Medication Sig Dispense Refill    ferrous sulfate (IRON 325) 325 (65 Fe) MG tablet Take 1 tablet by mouth 2 times daily 60 tablet 5    docusate sodium (COLACE) 100 MG capsule Take 1 capsule by mouth 2 times daily as needed for Constipation 60 capsule 0    ondansetron (ZOFRAN-ODT) 4 MG disintegrating tablet Take 1 tablet by mouth 3 times daily as needed for Nausea or Vomiting 15 tablet 0    Prenatal Vit-Fe Fumarate-FA (PRENATAL VITAMIN) 27-0.8 MG TABS Take 1 tablet by mouth daily 90 tablet 3     No facility-administered encounter medications on file as of 2/17/2023.                Labor signs, pregnancy warning signs, and fetal movement counting reviewed (if applicable)        Sam Mckeon NP, APRN - CNP 02/17/23 9:32 AM

## 2023-02-17 NOTE — ASSESSMENT & PLAN NOTE
PTL/labor precautions, [address], and pregnancy warning signs reviewed. Pt advised to call the office at [phone] or go straight to Labor and Delivery at [facility] with any of the following concerns vaginal bleeding, leaking of fluid, zeeshan regularly Q 5-7 minutes for over an hour or not feeling the baby move.    RTO 2 weeks OBV

## 2023-02-22 ENCOUNTER — ROUTINE PRENATAL (OUTPATIENT)
Dept: OBGYN CLINIC | Age: 23
End: 2023-02-22
Payer: COMMERCIAL

## 2023-02-22 VITALS
SYSTOLIC BLOOD PRESSURE: 112 MMHG | HEIGHT: 66 IN | BODY MASS INDEX: 27.8 KG/M2 | DIASTOLIC BLOOD PRESSURE: 80 MMHG | WEIGHT: 173 LBS

## 2023-02-22 DIAGNOSIS — O26.32 RETAINED INTRAUTERINE DEVICE (IUD) DURING PREGNANCY IN SECOND TRIMESTER: ICD-10-CM

## 2023-02-22 DIAGNOSIS — N89.8 VAGINAL DISCHARGE DURING PREGNANCY IN THIRD TRIMESTER: ICD-10-CM

## 2023-02-22 DIAGNOSIS — N94.89 LABIAL SWELLING: ICD-10-CM

## 2023-02-22 DIAGNOSIS — O26.893 VAGINAL DISCHARGE DURING PREGNANCY IN THIRD TRIMESTER: ICD-10-CM

## 2023-02-22 DIAGNOSIS — Z87.59 HISTORY OF DELIVERY OF MACROSOMAL INFANT: Primary | ICD-10-CM

## 2023-02-22 DIAGNOSIS — Z34.83 MULTIGRAVIDA IN THIRD TRIMESTER: ICD-10-CM

## 2023-02-22 PROCEDURE — 99213 OFFICE O/P EST LOW 20 MIN: CPT | Performed by: NURSE PRACTITIONER

## 2023-02-22 RX ORDER — FLUCONAZOLE 150 MG/1
150 TABLET ORAL
Qty: 2 TABLET | Refills: 0 | Status: SHIPPED | OUTPATIENT
Start: 2023-02-22 | End: 2023-02-26

## 2023-02-22 ASSESSMENT — PATIENT HEALTH QUESTIONNAIRE - PHQ9
1. LITTLE INTEREST OR PLEASURE IN DOING THINGS: 0
SUM OF ALL RESPONSES TO PHQ QUESTIONS 1-9: 0
SUM OF ALL RESPONSES TO PHQ9 QUESTIONS 1 & 2: 0
SUM OF ALL RESPONSES TO PHQ QUESTIONS 1-9: 0
2. FEELING DOWN, DEPRESSED OR HOPELESS: 0

## 2023-02-22 NOTE — ASSESSMENT & PLAN NOTE
exam AGAIN c/w yeast infection despite tx for chlamydia and yeast  Pt wanting to try oral fluconazole  We review at length below risks associated with fluconazole, especially at doses 400 mg and greater a day and in the first trimester. Pt understands risks and still wishes to proceed given recurrence in nature      Based on human data, in utero exposure to high doses of fluconazole may cause fetal harm. Following exposure during the first trimester, malformations have been noted in humans when maternal fluconazole was used in higher doses (?400 mg/day). Abnormalities reported include brachycephaly, abnormal facies, abnormal calvarial development, cleft palate, femoral bowing, thin ribs and long bones, arthrogryposis, and congenital heart disease. Fetal outcomes following exposure to lower doses is less clear and additional study is needed to confirm an association between maternal use of low dose fluconazole and an increased risk of birth defects. However, epidemiological studies of fluconazole ? 150 mg as a single dose or repeated doses in the first trimester suggest a potential risk of spontaneous  and malformations (Budani ; Diflucan [fluconazole oral] prescribing information). Oral fluconazole for the treatment of vaginal candidiasis is not recommended during pregnancy. Topical therapy for oral or vaginal candidiasis is recommended in pregnant patients (HHS [OI adult 2020]; Mona Knox [CDC 1468]). Secondary prophylaxis or chronic maintenance therapy using oral or IV fluconazole should not be initiated during pregnancy for esophageal, oropharyngeal, or vaginal candidiasis; fluconazole should be discontinued if pregnancy occurs during therapy (HHS [OI adult 2020]). Fluconazole is not the treatment of choice for invasive candidiasis in pregnant patients (IDSA [Shimon 2016]).  Fluconazole may be used for the treatment of cryptococcosis or coccidioidomycosis after the first trimester if otherwise appropriate (HHS [OI adult 2020]; IDSA [Shaylai 2016]; Pastick 2020).

## 2023-02-22 NOTE — PROGRESS NOTES
This is a 25 y.o.  [id number] at 31w0d for routine OB visit. Her Estimated Due Date is 2023, by Ultrasound    Denies leaking of fluid, vaginal bleeding, or regular contractions. Reports fetal movement. Pt completed treatment for chlamydia and yeast infection. Symptoms improved but returned. Having labial swelling, rash and cottage cheese like discharge    Current Outpatient Medications on File Prior to Visit   Medication Sig Dispense Refill    ferrous sulfate (IRON 325) 325 (65 Fe) MG tablet Take 1 tablet by mouth 2 times daily 60 tablet 5    docusate sodium (COLACE) 100 MG capsule Take 1 capsule by mouth 2 times daily as needed for Constipation 60 capsule 0    Prenatal Vit-Fe Fumarate-FA (PRENATAL VITAMIN) 27-0.8 MG TABS Take 1 tablet by mouth daily 90 tablet 3    ondansetron (ZOFRAN-ODT) 4 MG disintegrating tablet Take 1 tablet by mouth 3 times daily as needed for Nausea or Vomiting (Patient not taking: Reported on 2023) 15 tablet 0     No current facility-administered medications on file prior to visit.        Allergies   Allergen Reactions    Latex Rash       OB History    Para Term  AB Living   4 2 2 0 1 2   SAB IAB Ectopic Molar Multiple Live Births   1 0 0 0 0 2       # 1 - Date: 10/14/17, Sex: Male, Weight: 10 lb 13.9 oz (4.93 kg), GA: 41w0d, Delivery: Vaginal, Spontaneous, Apgar1: 8, Apgar5: 9, Living: Living, Birth Comments: None    # 2 - Date: 19, Sex: Female, Weight: 8 lb 11.9 oz (3.965 kg), GA: 40w3d, Delivery: Vaginal, Spontaneous, Apgar1: 9, Apgar5: 9, Living: Living, Birth Comments: None    # 3 - Date: 2021, Sex: None, Weight: None, GA: None, Delivery: None, Apgar1: None, Apgar5: None, Living: None, Birth Comments: None    # 4 - Date: None, Sex: None, Weight: None, GA: None, Delivery: None, Apgar1: None, Apgar5: None, Living: None, Birth Comments: None        Past Medical History:   Diagnosis Date    ADHD (attention deficit hyperactivity disorder) 2015  Anemia during pregnancy in third trimester 7/11/2019    Condyloma acuminata 8/1/2017    noted     Depressive disorder 11/12/2015    Fetal ventricular septal defect affecting antepartum care of mother, fetus 1 12/6/2022    H/O seasonal allergies     Syncope and collapse 11/12/2015       Past Surgical History:   Procedure Laterality Date    APPENDECTOMY         Family History   Problem Relation Age of Onset    Colon Cancer Neg Hx     Uterine Cancer Neg Hx     Ovarian Cancer Neg Hx     Breast Cancer Neg Hx     No Known Problems Sister     Cancer Paternal Grandmother         brain    No Known Problems Maternal Grandmother     Depression Mother     Anxiety Disorder Mother     Other Mother         lymphoma    Seizures Father     Seizures Sister     Muscular Dystrophy Mother         diagnosed at age 16yr       Social History     Socioeconomic History    Marital status:      Spouse name: Not on file    Number of children: Not on file    Years of education: Not on file    Highest education level: Not on file   Occupational History    Not on file   Tobacco Use    Smoking status: Never    Smokeless tobacco: Never   Substance and Sexual Activity    Alcohol use: No    Drug use: No    Sexual activity: Yes     Partners: Male     Birth control/protection: I.U.D. Other Topics Concern    Not on file   Social History Narrative    Abuse: Feels safe at home, no history of physical abuse, no history of sexual abuse  Lives with boyfriend       Social Determinants of Health     Financial Resource Strain: Low Risk     Difficulty of Paying Living Expenses: Not hard at all   Food Insecurity: No Food Insecurity    Worried About Running Out of Food in the Last Year: Never true    [address] in the Last Year: Never true   Transportation Needs: Unknown    Lack of Transportation (Medical): Not on file    Lack of Transportation (Non-Medical):  No   Physical Activity: Not on file   Stress: Not on file   Social Connections: Not on file   Intimate Partner Violence: Not on file   Housing Stability: Unknown    Unable to Pay for Housing in the Last Year: Not on file    Number of [place] in the Last Year: Not on file    Unstable Housing in the Last Year: No           Objective    Vitals:    02/22/23 0915   BP: 112/80   Site: Left Upper Arm   Position: Sitting   Weight: 173 lb (78.5 kg)   Height: 5' 6\" (1.676 m)       General: well developed, well nourished, in no acute distress    Head: normocephalic and atraumatic    Resp: even and unlabored    Pelvic Exam:       External: normal female genitalia without lesions or masses, labial mildly enlarged       Vagina: normal without lesions or masses, large amt cottage cheese like discharge noted      Psych: Normal mood and affect        Assessment and Plan      Patient Active Problem List    Diagnosis Date Noted    Chlamydia infection affecting pregnancy in third trimester 02/06/2023     Priority: Medium     Overview Note:     2/6/2023: Dx chlamydia, plan DIMITRY in 4 weeks      Anemia affecting pregnancy in third trimester 02/03/2023     Priority: Medium     Overview Note:     Add'l iron      Labial swelling 02/02/2023     Priority: Medium     Assessment & Plan Note:     exam AGAIN c/w yeast infection despite tx for chlamydia and yeast  Pt wanting to try oral fluconazole  We review at length below risks associated with fluconazole, especially at doses 400 mg and greater a day and in the first trimester. Pt understands risks and still wishes to proceed given recurrence in nature      Based on human data, in utero exposure to high doses of fluconazole may cause fetal harm. Following exposure during the first trimester, malformations have been noted in humans when maternal fluconazole was used in higher doses (?400 mg/day).  Abnormalities reported include brachycephaly, abnormal facies, abnormal calvarial development, cleft palate, femoral bowing, thin ribs and long bones, arthrogryposis, and congenital heart disease. Fetal outcomes following exposure to lower doses is less clear and additional study is needed to confirm an association between maternal use of low dose fluconazole and an increased risk of birth defects. However, epidemiological studies of fluconazole ? 150 mg as a single dose or repeated doses in the first trimester suggest a potential risk of spontaneous  and malformations (Budani ; Diflucan [fluconazole oral] prescribing information). Oral fluconazole for the treatment of vaginal candidiasis is not recommended during pregnancy. Topical therapy for oral or vaginal candidiasis is recommended in pregnant patients (HHS [OI adult 2020]; Jing Noguera [CDC 1935]). Secondary prophylaxis or chronic maintenance therapy using oral or IV fluconazole should not be initiated during pregnancy for esophageal, oropharyngeal, or vaginal candidiasis; fluconazole should be discontinued if pregnancy occurs during therapy (HHS [OI adult 2020]). Fluconazole is not the treatment of choice for invasive candidiasis in pregnant patients (IDSA [Shimon 2016]). Fluconazole may be used for the treatment of cryptococcosis or coccidioidomycosis after the first trimester if otherwise appropriate (HHS [OI adult 2020]; IDSA [Galgiani 2016]; Pastick 2020).  Fetal ventricular septal defect affecting antepartum care of mother, fetus 1 2022     Priority: Medium     Overview Note:     22: ? Small VSD, will recheck in 4 weeks  1/3/23:  EFW 98%, AC 98%, HODA 15.4 cm, vtx, no evidence of VSD      Multigravida in third trimester 2022     Priority: Medium     Overview Note:     EDC by 6 1/7 week US -- unknown LMP with IUD in-situ    2019: CF/SMA negative (prev pregnancy)  22: NIPT negx3, female  23: declines tdap today       Assessment & Plan Note:     PTL/labor precautions, [address], and pregnancy warning signs reviewed.  Pt advised to call the office at [phone] or go straight to Labor and Delivery at [facility] with any of the following concerns vaginal bleeding, leaking of fluid, zeeshan regularly Q 5-7 minutes for over an hour or not feeling the baby move. RTO as already scheduled      Retained intrauterine device (IUD) during pregnancy in second trimester 08/24/2022     Priority: Medium     Overview Note:     8/24/2022: US today reveals 5w5d IUP, +GS, +YS, + FP, no fetal cardiac activity  Mirena IUD noted in posterior lateral part of uterus, ?perforation of arm of IUD    9/1/2022: viability confirmed, given IUD strings lost and ?perforation plan IUD to remain in situ. D/W Dr Subha Stoll History of delivery of macrosomal infant 09/29/2022     Overview Note:     G1 -- 10+ lbs, G2 - 8+ lbs    PLAN:  Serial growth in 3rd trimester    12/6/22: EFW >99%, AC >99%, HODA n, Cx nl, BREECH  1/3/23:  EFW 98%, AC 98%, HODA 15.4 cm, vtx, no evidence of VSD  2/17/2023: EFW >99%, AC >99%, HODA nl, vertex EFW 5lb4oz, again discuss limiting carbs         Problem List Items Addressed This Visit        Other    Labial swelling     exam AGAIN c/w yeast infection despite tx for chlamydia and yeast  Pt wanting to try oral fluconazole  We review at length below risks associated with fluconazole, especially at doses 400 mg and greater a day and in the first trimester. Pt understands risks and still wishes to proceed given recurrence in nature      Based on human data, in utero exposure to high doses of fluconazole may cause fetal harm. Following exposure during the first trimester, malformations have been noted in humans when maternal fluconazole was used in higher doses (?400 mg/day). Abnormalities reported include brachycephaly, abnormal facies, abnormal calvarial development, cleft palate, femoral bowing, thin ribs and long bones, arthrogryposis, and congenital heart disease.  Fetal outcomes following exposure to lower doses is less clear and additional study is needed to confirm an association between maternal use of low dose fluconazole and an increased risk of birth defects. However, epidemiological studies of fluconazole ? 150 mg as a single dose or repeated doses in the first trimester suggest a potential risk of spontaneous  and malformations (Budani ; Diflucan [fluconazole oral] prescribing information). Oral fluconazole for the treatment of vaginal candidiasis is not recommended during pregnancy. Topical therapy for oral or vaginal candidiasis is recommended in pregnant patients (HHS [OI adult 2020]; Jose Lares [[facility] 2778]). Secondary prophylaxis or chronic maintenance therapy using oral or IV fluconazole should not be initiated during pregnancy for esophageal, oropharyngeal, or vaginal candidiasis; fluconazole should be discontinued if pregnancy occurs during therapy (HHS [OI adult 2020]). Fluconazole is not the treatment of choice for invasive candidiasis in pregnant patients (IDSA [Shimon 2016]). Fluconazole may be used for the treatment of cryptococcosis or coccidioidomycosis after the first trimester if otherwise appropriate (HHS [OI adult 2020]; IDSA [Galgiani 2016]; Pastick 2020). Multigravida in third trimester     PTL/labor precautions, [address], and pregnancy warning signs reviewed. Pt advised to call the office at [phone] or go straight to Labor and Delivery at [facility] with any of the following concerns vaginal bleeding, leaking of fluid, zeeshan regularly Q 5-7 minutes for over an hour or not feeling the baby move.    RTO as already scheduled         Retained intrauterine device (IUD) during pregnancy in second trimester    History of delivery of macrosomal infant - Primary   Other Visit Diagnoses     Vaginal discharge during pregnancy in third trimester        Relevant Orders    NuSwab Vaginitis Plus (VG+) with Canddia (Six Species)          Orders Placed This Encounter   Procedures    NuSwab Vaginitis Plus (VG+) with Renetta (Six Species)       Outpatient Encounter Medications as of 2/22/2023   Medication Sig Dispense Refill    fluconazole (DIFLUCAN) 150 MG tablet Take 1 tablet by mouth every 72 hours for 2 doses 2 tablet 0    terconazole (TERAZOL 7) 0.4 % vaginal cream Place vaginally nightly. 45 g 0    ferrous sulfate (IRON 325) 325 (65 Fe) MG tablet Take 1 tablet by mouth 2 times daily 60 tablet 5    docusate sodium (COLACE) 100 MG capsule Take 1 capsule by mouth 2 times daily as needed for Constipation 60 capsule 0    Prenatal Vit-Fe Fumarate-FA (PRENATAL VITAMIN) 27-0.8 MG TABS Take 1 tablet by mouth daily 90 tablet 3    ondansetron (ZOFRAN-ODT) 4 MG disintegrating tablet Take 1 tablet by mouth 3 times daily as needed for Nausea or Vomiting (Patient not taking: Reported on 2/22/2023) 15 tablet 0     No facility-administered encounter medications on file as of 2/22/2023.                Labor signs, pregnancy warning signs, and fetal movement counting reviewed (if applicable)        ISAÍAS Rasheed CNP 02/22/23 9:34 AM

## 2023-02-22 NOTE — ASSESSMENT & PLAN NOTE
PTL/labor precautions, [facility], and pregnancy warning signs reviewed. Pt advised to call the office at [phone] or go straight to Labor and Delivery at [address] with any of the following concerns vaginal bleeding, leaking of fluid, zeeshan regularly Q 5-7 minutes for over an hour or not feeling the baby move.    RTO as already scheduled

## 2023-02-22 NOTE — PROGRESS NOTES
Patient comes in today for possible yeast infection while pregnant. Pt reports heavy white discharge and vaginal swelling.      Fetal Movement: Yes  Contractions: No  Vaginal Bleeding: No  Leaking Fluid: No  GI/: No    Vitals:    02/22/23 0915   BP: 112/80   Site: Left Upper Arm   Position: Sitting   Weight: 173 lb (78.5 kg)   Height: 5' 6\" (1.676 m)

## 2023-02-24 NOTE — PROGRESS NOTES
I have reviewed the patient's visit today including history, exam and assessment by MARC Sheikh. I agree with treatment/plan as above.     Michelle Shelton MD  9:52 AM  02/24/23

## 2023-02-26 LAB
A VAGINAE DNA VAG QL NAA+PROBE: ABNORMAL SCORE
BVAB2 DNA VAG QL NAA+PROBE: ABNORMAL SCORE
C ALBICANS DNA VAG QL NAA+PROBE: POSITIVE
C GLABRATA DNA VAG QL NAA+PROBE: NEGATIVE
C TRACH RRNA SPEC QL NAA+PROBE: POSITIVE
MEGA1 DNA VAG QL NAA+PROBE: ABNORMAL SCORE
N GONORRHOEA RRNA SPEC QL NAA+PROBE: NEGATIVE
T VAGINALIS RRNA SPEC QL NAA+PROBE: NEGATIVE

## 2023-02-28 ENCOUNTER — TELEPHONE (OUTPATIENT)
Dept: OBGYN CLINIC | Age: 23
End: 2023-02-28

## 2023-02-28 NOTE — TELEPHONE ENCOUNTER
Called pt, message below reviewed with pt. Pt voiced understanding. Pt stated she has not had intercourse since late October. Advised pt that we will recheck at her next visit. Pt also stated that she was still a little itchy. Advised pt not to use soaps in the vaginal are and to be gentle. Pt voiced understanding and stated if she still has issues then she will call.

## 2023-02-28 NOTE — TELEPHONE ENCOUNTER
Please let pt know nukristiab did confirm yeast infection. Complete her medication as prescribed. Also again POS for chlamydia but this is likely because she just completed tx on the 6th for chlamydia      Has she had intercourse since she completed chlmaydia treatment. If not, we will just recheck at her next visit.  If she has had intercourse recommend retreatment with  Azithromycin 1gram PO Once, No Refills

## 2023-03-01 ENCOUNTER — HOSPITAL ENCOUNTER (EMERGENCY)
Age: 23
Discharge: HOME | End: 2023-03-01
Attending: EMERGENCY MEDICINE
Payer: COMMERCIAL

## 2023-03-01 VITALS
SYSTOLIC BLOOD PRESSURE: 109 MMHG | BODY MASS INDEX: 28.45 KG/M2 | HEIGHT: 66 IN | TEMPERATURE: 98.7 F | RESPIRATION RATE: 18 BRPM | OXYGEN SATURATION: 97 % | WEIGHT: 177 LBS | DIASTOLIC BLOOD PRESSURE: 52 MMHG | HEART RATE: 126 BPM

## 2023-03-01 DIAGNOSIS — G89.18 POSTOPERATIVE PAIN: ICD-10-CM

## 2023-03-01 DIAGNOSIS — K64.4 EXTERNAL HEMORRHOID: Primary | ICD-10-CM

## 2023-03-01 PROCEDURE — 99283 EMERGENCY DEPT VISIT LOW MDM: CPT

## 2023-03-01 PROCEDURE — 6370000000 HC RX 637 (ALT 250 FOR IP): Performed by: EMERGENCY MEDICINE

## 2023-03-01 RX ORDER — HYDROCODONE BITARTRATE AND ACETAMINOPHEN 5; 325 MG/1; MG/1
1 TABLET ORAL EVERY 8 HOURS PRN
Qty: 9 TABLET | Refills: 0 | Status: SHIPPED | OUTPATIENT
Start: 2023-03-01 | End: 2023-03-04

## 2023-03-01 RX ORDER — HYDROCORTISONE 25 MG/G
CREAM TOPICAL
Status: COMPLETED | OUTPATIENT
Start: 2023-03-01 | End: 2023-03-01

## 2023-03-01 RX ORDER — HYDROCORTISONE ACETATE 25 MG/1
25 SUPPOSITORY RECTAL 2 TIMES DAILY
Qty: 10 SUPPOSITORY | Refills: 0 | Status: SHIPPED | OUTPATIENT
Start: 2023-03-01 | End: 2023-03-06

## 2023-03-01 RX ORDER — HYDROCODONE BITARTRATE AND ACETAMINOPHEN 5; 325 MG/1; MG/1
1 TABLET ORAL
Status: DISCONTINUED | OUTPATIENT
Start: 2023-03-01 | End: 2023-03-01

## 2023-03-01 RX ADMIN — HYDROCORTISONE: 25 CREAM TOPICAL at 10:27

## 2023-03-01 ASSESSMENT — LIFESTYLE VARIABLES
HOW OFTEN DO YOU HAVE A DRINK CONTAINING ALCOHOL: NEVER
HOW MANY STANDARD DRINKS CONTAINING ALCOHOL DO YOU HAVE ON A TYPICAL DAY: PATIENT DOES NOT DRINK

## 2023-03-01 ASSESSMENT — PAIN - FUNCTIONAL ASSESSMENT: PAIN_FUNCTIONAL_ASSESSMENT: 0-10

## 2023-03-01 ASSESSMENT — ENCOUNTER SYMPTOMS: ANAL BLEEDING: 0

## 2023-03-01 ASSESSMENT — PAIN DESCRIPTION - LOCATION: LOCATION: RECTUM

## 2023-03-01 ASSESSMENT — PAIN SCALES - GENERAL: PAINLEVEL_OUTOF10: 8

## 2023-03-01 NOTE — ED TRIAGE NOTES
Patient was at Colon Rectal Surgery yesterday with a thrombosed external hemorrhoid excised and having lots of pain and bleeding since procedure. Patient complaining of weakness. Patient advises 32 weeks pregnant with 3rd pregnancy and 2 live births.

## 2023-03-01 NOTE — DISCHARGE INSTRUCTIONS
Use the sitz bath's and ice pack to the affected area as directed by your surgeon.    Use the rectal steroid twice daily and take the least amount of narcotic pain medicine possible for control of your severe pain.    Any narcotic pain medicine you take can affect her pregnancy.  Please consult with your OB/GYN prior to taking the medication.    Risk of opioid analgesics include, but are not limited to: Overdose they can stop or slow your breathing and lead to death; fractures from falls; drowsiness leading to injury; tolerance, dependence and addiction. You should not operate any motorized vehicles or work from a height greater than ground level when taking opioid analgesics as they increase your fall risks.

## 2023-03-01 NOTE — ED PROVIDER NOTES
Emergency Department Provider Note                   PCP:                On File Not (Inactive)               Age: 22 y.o.      Sex: female       ICD-10-CM    1. External hemorrhoid  K64.4 HYDROcodone-acetaminophen (NORCO) 5-325 MG per tablet      2. Postoperative pain  G89.18 HYDROcodone-acetaminophen (NORCO) 5-325 MG per tablet          DISPOSITION Decision To Discharge 03/01/2023 10:33:16 AM       MEDICAL DECISION MAKING  Complexity of Problems Addressed:  1 chronic illnesses with severe exacerbation or progression.    Data Reviewed and Analyzed:  Category 1:   I reviewed records from an external source: ED records from outside this hospital.  I reviewed records from an external source: provider visit notes from PCP.  I reviewed records from an external source: provider visit notes from outside specialist.  I reviewed records from an external source: previous lab results from outside ED.  I reviewed records from an external source: previous imaging studies from outside ED.  I ordered each unique test.  I reviewed the results of each unique test.        Category 2:         Category 3: Discussion of management or test interpretation.      ED Course as of 03/01/23 1036   Wed Mar 01, 2023   1015 I left a message with the patient's colorectal surgeon's office and one of his partners is going to call me back.  Patient did not try reaching out to her surgeon's office prior to coming to the ER. [KH]   1031 Dr. Harris the patients colorectal surgeon was contacted and he recommends warm soaks and ice packs to the area and if she needs narcotic pain medicine for couple days is agreeable with this along with rectal steroids. [KH]   1032 I explained to the patient the plan and that on Friday if her symptoms are not better she could be seen by her surgeon in the office once again. [KH]   1032 I did talk to the patient about the risk of taking narcotic pain medicines while pregnant. [KH]      ED Course User Index  [KH]  Steve Qiu DO       Risk of Complications and/or Morbidity of Patient Management:  OTC drug management completed, Prescription drug management completed, and Patient was discharged risks and benefits of hospitalization were considered       Korina Hsieh is a 22 y.o. female who presents to the Emergency Department with chief complaint of    Chief Complaint   Patient presents with    Hemorrhoids      Patient is a 22-year-old female presenting to the emergency department today complaining of rectal pain status post hemorrhoidectomy yesterday in her surgeon's office.  The patient states that this is the second time she is had to have this done.  Previously she did well with it and did not have as much pain she is experiencing postoperatively.  The patient is 32 weeks pregnant with her third child.         Review of Systems   Constitutional:  Positive for chills.   Gastrointestinal:  Negative for anal bleeding.     Vitals signs and nursing note reviewed.   Patient Vitals for the past 4 hrs:   Temp Pulse Resp BP SpO2   03/01/23 1015 -- (!) 134 -- 112/61 98 %   03/01/23 0846 98.7 °F (37.1 °C) (!) 133 16 107/64 98 %          Physical Exam     GENERAL:The patient has Body mass index is 28.57 kg/m². Well-hydrated.  No acute distress.  VITAL SIGNS: Heart rate, blood pressure, respiratory rate reviewed as recorded in  nurse's notes  EYES: Pupils reactive. Extraocular motion intact. No conjunctival redness or drainage.  LUNGS: No accessory muscle use  CARDIOVASCULAR: Regular rate and rhythm  ABD: Soft no guarding or rigidity.  RECTAL: Postsurgical hemorrhoidectomy with elliptical incision noted no active bleeding present.  Some fullness still at the site of the external hemorrhoid but no evidence of recurrent thrombosis present.  Patient has tenderness with minor manipulation to the area.  EXTREMITIES: Pt moving all 4 extremities with out limitations. Normal muscle tone.  NEUROLOGIC: Cranial nerve exam reveals face is  symmetrical, tongue is midline  speech is clear. No focal deficits noted  SKIN: No rash or petechiae. Good skin turgor palpated. PSYCHIATRIC: Alert and oriented. Appropriate behavior and judgment. Procedures    ED Course as of 03/01/23 1036   Wed Mar 01, 2023   1015 I left a message with the patient's colorectal surgeon's office and one of his partners is going to call me back. Patient did not try reaching out to her surgeon's office prior to coming to the ER. [KH]   80 Dr. Boby Patel the patients colorectal surgeon was contacted and he recommends warm soaks and ice packs to the area and if she needs narcotic pain medicine for couple days is agreeable with this along with rectal steroids. [PX]   9093 I explained to the patient the plan and that on Friday if her symptoms are not better she could be seen by her surgeon in the office once again. [UW]   1252 I did talk to the patient about the risk of taking narcotic pain medicines while pregnant. [KH]      ED Course User Index  [KH] Vonda Montana DO        No orders of the defined types were placed in this encounter. Medications   HYDROcodone-acetaminophen (NORCO) 5-325 MG per tablet 1 tablet (has no administration in time range)   hydrocortisone (ANUSOL-HC) 2.5 % rectal cream ( Rectal Given 3/1/23 1027)       New Prescriptions    HYDROCODONE-ACETAMINOPHEN (NORCO) 5-325 MG PER TABLET    Take 1 tablet by mouth every 8 hours as needed for Pain for up to 3 days.  Take lowest dose possible to manage pain Max Daily Amount: 3 tablets    HYDROCORTISONE (ANUSOL-HC) 25 MG SUPPOSITORY    Place 1 suppository rectally 2 times daily for 5 days        Past Medical History:   Diagnosis Date    ADHD (attention deficit hyperactivity disorder) 11/12/2015    Anemia during pregnancy in third trimester 7/11/2019    Condyloma acuminata 8/1/2017    noted     Depressive disorder 11/12/2015    Fetal ventricular septal defect affecting antepartum care of mother, fetus 1 12/6/2022 H/O seasonal allergies     Syncope and collapse 11/12/2015        Past Surgical History:   Procedure Laterality Date    APPENDECTOMY          Family History   Problem Relation Age of Onset    Colon Cancer Neg Hx     Uterine Cancer Neg Hx     Ovarian Cancer Neg Hx     Breast Cancer Neg Hx     No Known Problems Sister     Cancer Paternal Grandmother         brain    No Known Problems Maternal Grandmother     Depression Mother     Anxiety Disorder Mother     Other Mother         lymphoma    Seizures Father     Seizures Sister     Muscular Dystrophy Mother         diagnosed at age 17yr        Social History     Socioeconomic History    Marital status:    Tobacco Use    Smoking status: Never    Smokeless tobacco: Never   Substance and Sexual Activity    Alcohol use: No    Drug use: No    Sexual activity: Yes     Partners: Male     Birth control/protection: I.U.D.    Social History Narrative    Abuse: Feels safe at home, no history of physical abuse, no history of sexual abuse  Lives with boyfriend       Social Determinants of Health     Financial Resource Strain: Low Risk     Difficulty of Paying Living Expenses: Not hard at all   Food Insecurity: No Food Insecurity    Worried About [address] [place] [address] Hemo[place]ar in the Last Year: Never true    Ran Out of Food in the Last Year: Never true   Transportation Needs: Unknown    Lack of Transportation (Non-Medical): No   Housing Stability: Unknown    Unstable Housing in the Last Year: No        Allergies: Latex    Previous Medications    DOCUSATE SODIUM (COLACE) 100 MG CAPSULE    Take 1 capsule by mouth 2 times daily as needed for Constipation    FERROUS SULFATE (IRON 325) 325 (65 FE) MG TABLET    Take 1 tablet by mouth 2 times daily    ONDANSETRON (ZOFRAN-ODT) 4 MG DISINTEGRATING TABLET    Take 1 tablet by mouth 3 times daily as needed for Nausea or Vomiting    PRENATAL VIT-FE FUMARATE-FA (PRENATAL VITAMIN) 27-0.8 MG TABS    Take 1 tablet by mouth daily    TERCONAZOLE (TERAZOL 7) 0.4 % VAGINAL CREAM    Place vaginally nightly. No results found for any visits on 03/01/23. No orders to display                     Voice dictation software was used during the making of this note. This software is not perfect and grammatical and other typographical errors may be present. This note has not been completely proofread for errors.        Inocente Nyhan, DO  03/01/23 1032

## 2023-03-01 NOTE — ED NOTES
I have reviewed discharge instructions with the patient. The patient verbalized understanding. Patient left ED via Discharge Method: ambulatory to Home with (self). Opportunity for questions and clarification provided. Patient given 2 scripts. To continue your aftercare when you leave the hospital, you may receive an automated call from our care team to check in on how you are doing.  This is a free service and part of our promise to provide the best care and service to meet your aftercare needs. \" If you have questions, or wish to unsubscribe from this service please call [phone].  Thank you for Choosing our [facility] Emergency Department.         Shena Cortez RN  03/01/23 2615

## 2023-03-07 ENCOUNTER — ROUTINE PRENATAL (OUTPATIENT)
Dept: OBGYN CLINIC | Age: 23
End: 2023-03-07
Payer: COMMERCIAL

## 2023-03-07 VITALS
DIASTOLIC BLOOD PRESSURE: 60 MMHG | WEIGHT: 177 LBS | BODY MASS INDEX: 28.45 KG/M2 | SYSTOLIC BLOOD PRESSURE: 122 MMHG | HEIGHT: 66 IN

## 2023-03-07 DIAGNOSIS — Z87.59 HISTORY OF DELIVERY OF MACROSOMAL INFANT: Primary | ICD-10-CM

## 2023-03-07 DIAGNOSIS — A74.9 CHLAMYDIA INFECTION AFFECTING PREGNANCY IN THIRD TRIMESTER: ICD-10-CM

## 2023-03-07 DIAGNOSIS — O99.013 ANEMIA AFFECTING PREGNANCY IN THIRD TRIMESTER: ICD-10-CM

## 2023-03-07 DIAGNOSIS — O26.32 RETAINED INTRAUTERINE DEVICE (IUD) DURING PREGNANCY IN SECOND TRIMESTER: ICD-10-CM

## 2023-03-07 DIAGNOSIS — O98.813 CHLAMYDIA INFECTION AFFECTING PREGNANCY IN THIRD TRIMESTER: ICD-10-CM

## 2023-03-07 DIAGNOSIS — O35.BXX1 FETAL VENTRICULAR SEPTAL DEFECT AFFECTING ANTEPARTUM CARE OF MOTHER, FETUS 1: ICD-10-CM

## 2023-03-07 DIAGNOSIS — R82.998 DARK URINE: ICD-10-CM

## 2023-03-07 DIAGNOSIS — Z34.83 MULTIGRAVIDA IN THIRD TRIMESTER: ICD-10-CM

## 2023-03-07 LAB
BILIRUBIN, URINE, POC: ABNORMAL
BLOOD URINE, POC: NEGATIVE
GLUCOSE URINE, POC: NEGATIVE
KETONES, URINE, POC: ABNORMAL
LEUKOCYTE ESTERASE, URINE, POC: NEGATIVE
NITRITE, URINE, POC: NEGATIVE
PH, URINE, POC: 7 (ref 4.6–8)
PROTEIN,URINE, POC: ABNORMAL
SPECIFIC GRAVITY, URINE, POC: 1.02 (ref 1–1.03)
URINALYSIS CLARITY, POC: ABNORMAL
URINALYSIS COLOR, POC: ABNORMAL
UROBILINOGEN, POC: NORMAL

## 2023-03-07 PROCEDURE — 81002 URINALYSIS NONAUTO W/O SCOPE: CPT | Performed by: NURSE PRACTITIONER

## 2023-03-07 PROCEDURE — 99213 OFFICE O/P EST LOW 20 MIN: CPT | Performed by: NURSE PRACTITIONER

## 2023-03-07 ASSESSMENT — PATIENT HEALTH QUESTIONNAIRE - PHQ9
SUM OF ALL RESPONSES TO PHQ QUESTIONS 1-9: 0
SUM OF ALL RESPONSES TO PHQ QUESTIONS 1-9: 0
2. FEELING DOWN, DEPRESSED OR HOPELESS: 0
SUM OF ALL RESPONSES TO PHQ9 QUESTIONS 1 & 2: 0
SUM OF ALL RESPONSES TO PHQ QUESTIONS 1-9: 0
1. LITTLE INTEREST OR PLEASURE IN DOING THINGS: 0
SUM OF ALL RESPONSES TO PHQ QUESTIONS 1-9: 0

## 2023-03-07 NOTE — PROGRESS NOTES
Patient comes in today for routine prenatal visit. Patient request a cervical check due to feeling like baby is in her pelvis.       Fetal Movement: Yes  Contractions: Yes, irregular  Vaginal Bleeding: Yes, spotting last night (pink tinge to discharge)  Leaking Fluid: No  GI/: Yes, dark urine    Vitals:    03/07/23 1000   BP: 122/60   Site: Left Upper Arm   Position: Sitting   Weight: 177 lb (80.3 kg)   Height: 5' 6\" (1.676 m)

## 2023-03-07 NOTE — ASSESSMENT & PLAN NOTE
PTL/labor precautions, [facility], and pregnancy warning signs reviewed. Pt advised to call the office at [phone] or go straight to Labor and Delivery at [facility] with any of the following concerns vaginal bleeding, leaking of fluid, zeeshan regularly Q 5-7 minutes for over an hour or not feeling the baby move. RTO 2 weeks OBV/US      We review today pelvic pain, heartburn and constipation relief measures. Pt states she will be re-starting miralax. Advised pt to try pregnancy support belt as well as prevacid or tagamet.

## 2023-03-07 NOTE — PROGRESS NOTES
This is a 25 y.o.  [id number] at [address] [place] [address] for routine OB visit. Her Estimated Due Date is 2023, by Ultrasound    Denies leaking of fluid, vaginal bleeding, or regular contractions today. Reports fetal movement. Pt states she noticed small amt of bleeding after BM yesterday. Today feeling more vaginal pressure like baby is very low. Current Outpatient Medications on File Prior to Visit   Medication Sig Dispense Refill    ferrous sulfate (IRON 325) 325 (65 Fe) MG tablet Take 1 tablet by mouth 2 times daily 60 tablet 5    ondansetron (ZOFRAN-ODT) 4 MG disintegrating tablet Take 1 tablet by mouth 3 times daily as needed for Nausea or Vomiting 15 tablet 0    Prenatal Vit-Fe Fumarate-FA (PRENATAL VITAMIN) 27-0.8 MG TABS Take 1 tablet by mouth daily 90 tablet 3     No current facility-administered medications on file prior to visit.        Allergies   Allergen Reactions    Latex Rash       OB History    Para Term  AB Living   4 2 2 0 1 2   SAB IAB Ectopic Molar Multiple Live Births   1 0 0 0 0 2       # 1 - Date: 10/14/17, Sex: Male, Weight: 10 lb 13.9 oz (4.93 kg), GA: 41w0d, Delivery: Vaginal, Spontaneous, Apgar1: 8, Apgar5: 9, Living: Living, Birth Comments: None    # 2 - Date: 19, Sex: Female, Weight: 8 lb 11.9 oz (3.965 kg), GA: 40w3d, Delivery: Vaginal, Spontaneous, Apgar1: 9, Apgar5: 9, Living: Living, Birth Comments: None    # 3 - Date: 2021, Sex: None, Weight: None, GA: None, Delivery: None, Apgar1: None, Apgar5: None, Living: None, Birth Comments: None    # 4 - Date: None, Sex: None, Weight: None, GA: None, Delivery: None, Apgar1: None, Apgar5: None, Living: None, Birth Comments: None        Past Medical History:   Diagnosis Date    ADHD (attention deficit hyperactivity disorder) 2015    Anemia during pregnancy in third trimester 2019    Condyloma acuminata 2017    noted     Depressive disorder 2015    Fetal ventricular septal defect affecting antepartum care of mother, fetus 1 12/6/2022    H/O seasonal allergies     Syncope and collapse 11/12/2015       Past Surgical History:   Procedure Laterality Date    APPENDECTOMY      HEMORRHOID SURGERY  02/28/2023       Family History   Problem Relation Age of Onset    Colon Cancer Neg Hx     Uterine Cancer Neg Hx     Ovarian Cancer Neg Hx     Breast Cancer Neg Hx     No Known Problems Sister     Cancer Paternal Grandmother         brain    No Known Problems Maternal Grandmother     Depression Mother     Anxiety Disorder Mother     Other Mother         lymphoma    Seizures Father     Seizures Sister     Muscular Dystrophy Mother         diagnosed at age 16yr       Social History     Socioeconomic History    Marital status:      Spouse name: Not on file    Number of children: Not on file    Years of education: Not on file    Highest education level: Not on file   Occupational History    Not on file   Tobacco Use    Smoking status: Never    Smokeless tobacco: Never   Substance and Sexual Activity    Alcohol use: No    Drug use: No    Sexual activity: Yes     Partners: Male     Birth control/protection: I.U.D. Other Topics Concern    Not on file   Social History Narrative    Abuse: Feels safe at home, no history of physical abuse, no history of sexual abuse  Lives with boyfriend       Social Determinants of Health     Financial Resource Strain: Low Risk     Difficulty of Paying Living Expenses: Not hard at all   Food Insecurity: No Food Insecurity    Worried About Running Out of Food in the Last Year: Never true    [address] in the Last Year: Never true   Transportation Needs: Unknown    Lack of Transportation (Medical): Not on file    Lack of Transportation (Non-Medical):  No   Physical Activity: Not on file   Stress: Not on file   Social Connections: Not on file   Intimate Partner Violence: Not on file   Housing Stability: Unknown    Unable to Pay for Housing in the Last Year: Not on file    Number of Places Lived in the Last Year: Not on file    Unstable Housing in the Last Year: No           Objective    Vitals:    03/07/23 1000   BP: 122/60   Site: Left Upper Arm   Position: Sitting   Weight: 177 lb (80.3 kg)   Height: 5' 6\" (1.676 m)       General: well developed, well nourished, in no acute distress    Head: normocephalic and atraumatic    Resp: even and unlabored    Pelvic Exam:       External: normal female genitalia without lesions or masses       Vagina: normal without lesions or masses, clear discharge noted      Cervix: normal without lesions or masses   nuswab collected and sent to lab, FFN collected NOT sent, SVE cl/th/high      Psych: Normal mood and affect        Assessment and Plan      Patient Active Problem List    Diagnosis Date Noted    Chlamydia infection affecting pregnancy in third trimester 02/06/2023     Priority: Medium     Overview Note:     2/6/2023: Dx chlamydia, plan DIMITRY in 4 weeks       Assessment & Plan Note:     Recheck today      Anemia affecting pregnancy in third trimester 02/03/2023     Priority: Medium     Overview Note:     Add'l iron       Assessment & Plan Note:     noted      Labial swelling 02/02/2023     Priority: Medium    Fetal ventricular septal defect affecting antepartum care of mother, fetus 1 12/06/2022     Priority: Medium     Overview Note:     12/6/22: ? Small VSD, will recheck in 4 weeks  1/3/23:  EFW 98%, AC 98%, HODA 15.4 cm, vtx, no evidence of VSD  2/17/2023: EFW >99%, AC >99%, HODA nl, vertex EFW 5lb4oz, again discuss limiting carbs       Assessment & Plan Note:     noted      Multigravida in third trimester 09/01/2022     Priority: Medium     Overview Note:     EDC by 6 1/7 week US -- unknown LMP with IUD in-situ    5/9/2019: CF/SMA negative (prev pregnancy)  09/29/22: NIPT negx3, female  2/2/23: declines tdap today       Assessment & Plan Note:     PTL/labor precautions, [address], and pregnancy warning signs reviewed. Pt advised to call the office at [phone] or go straight to Labor and Delivery at [facility] with any of the following concerns vaginal bleeding, leaking of fluid, zeeshan regularly Q 5-7 minutes for over an hour or not feeling the baby move. RTO 2 weeks OBV/US      We review today pelvic pain, heartburn and constipation relief measures. Pt states she will be re-starting miralax. Advised pt to try pregnancy support belt as well as prevacid or tagamet.  Retained intrauterine device (IUD) during pregnancy in second trimester 08/24/2022     Priority: Medium     Overview Note:     8/24/2022: US today reveals 5w5d IUP, +GS, +YS, + FP, no fetal cardiac activity  Mirena IUD noted in posterior lateral part of uterus, ?perforation of arm of IUD    9/1/2022: viability confirmed, given IUD strings lost and ?perforation plan IUD to remain in situ. D/W Dr Cleveland Howard History of delivery of macrosomal infant 09/29/2022     Overview Note:     G1 -- 10+ lbs, G2 - 8+ lbs    PLAN:  Serial growth in 3rd trimester    12/6/22: EFW >99%, AC >99%, HODA n, Cx nl, BREECH  1/3/23:  EFW 98%, AC 98%, HODA 15.4 cm, vtx, no evidence of VSD  2/17/2023: EFW >99%, AC >99%, HODA nl, vertex EFW 5lb4oz, again discuss limiting carbs         Problem List Items Addressed This Visit        Circulatory    Fetal ventricular septal defect affecting antepartum care of mother, fetus 1     noted            Other    Anemia affecting pregnancy in third trimester     noted         Chlamydia infection affecting pregnancy in third trimester     Recheck today         Relevant Orders    Nuswab Vaginitis Plus (VG+)    Multigravida in third trimester     PTL/labor precautions, [facility], and pregnancy warning signs reviewed.  Pt advised to call the office at [phone] or go straight to Labor and Delivery at [facility] with any of the following concerns vaginal bleeding, leaking of fluid, zeeshan regularly Q 5-7 minutes for over an hour or not feeling the baby move. RTO 2 weeks OBV/US      We review today pelvic pain, heartburn and constipation relief measures. Pt states she will be re-starting miralax. Advised pt to try pregnancy support belt as well as prevacid or tagamet. Relevant Orders    Nuswab Vaginitis Plus (VG+)    Retained intrauterine device (IUD) during pregnancy in second trimester    History of delivery of macrosomal infant - Primary   Other Visit Diagnoses     Dark urine        Relevant Orders    AMB POC URINALYSIS DIP STICK MANUAL W/O MICRO (Completed)          Orders Placed This Encounter   Procedures    Nuswab Vaginitis Plus (VG+)    AMB POC URINALYSIS DIP STICK MANUAL W/O MICRO       Outpatient Encounter Medications as of 3/7/2023   Medication Sig Dispense Refill    ferrous sulfate (IRON 325) 325 (65 Fe) MG tablet Take 1 tablet by mouth 2 times daily 60 tablet 5    ondansetron (ZOFRAN-ODT) 4 MG disintegrating tablet Take 1 tablet by mouth 3 times daily as needed for Nausea or Vomiting 15 tablet 0    Prenatal Vit-Fe Fumarate-FA (PRENATAL VITAMIN) 27-0.8 MG TABS Take 1 tablet by mouth daily 90 tablet 3     No facility-administered encounter medications on file as of 3/7/2023.                Labor signs, pregnancy warning signs, and fetal movement counting reviewed (if applicable)        ISAÍAS Gamez - CNP 03/07/23 10:34 AM

## 2023-03-08 LAB
A VAGINAE DNA VAG QL NAA+PROBE: NORMAL SCORE
BVAB2 DNA VAG QL NAA+PROBE: NORMAL SCORE
C ALBICANS DNA VAG QL NAA+PROBE: NEGATIVE
C GLABRATA DNA VAG QL NAA+PROBE: NEGATIVE
C TRACH RRNA SPEC QL NAA+PROBE: NEGATIVE
MEGA1 DNA VAG QL NAA+PROBE: NORMAL SCORE
N GONORRHOEA RRNA SPEC QL NAA+PROBE: NEGATIVE
SPECIMEN SOURCE: NORMAL
T VAGINALIS RRNA SPEC QL NAA+PROBE: NEGATIVE

## 2023-03-09 NOTE — PROGRESS NOTES
I have reviewed the patient's visit today including history, exam and assessment by MARC Diaz. I agree with treatment/plan as above.     Horacio Landon MD  1:07 PM  03/09/23

## 2023-03-14 ENCOUNTER — HOSPITAL ENCOUNTER (OUTPATIENT)
Age: 23
Discharge: HOME | End: 2023-03-14
Attending: OBSTETRICS & GYNECOLOGY | Admitting: OBSTETRICS & GYNECOLOGY
Payer: COMMERCIAL

## 2023-03-14 VITALS
OXYGEN SATURATION: 98 % | HEART RATE: 98 BPM | TEMPERATURE: 98.2 F | SYSTOLIC BLOOD PRESSURE: 117 MMHG | DIASTOLIC BLOOD PRESSURE: 70 MMHG

## 2023-03-14 PROCEDURE — 99283 EMERGENCY DEPT VISIT LOW MDM: CPT

## 2023-03-14 PROCEDURE — 59025 FETAL NON-STRESS TEST: CPT

## 2023-03-14 NOTE — ED TRIAGE NOTES
Ob Triage Visit    Name: Liz Packer MRN: [id number]  SSN: [id number]    YOB: 2000  Age: 25 y.o. Sex: female      Subjective:     Reason for Triage visit:  33w6d and fall    History of Present Illness: Ms. Nel Schmidt is a 25 y.o.  female [id number] with an estimated gestational age of 32w10d with Estimated Date of Delivery: 23. Patient states that she fell today around 11 o'clock. She tripped over a blanket in her child's room and fell onto a bunk bed, hitting her right arm/ elbow. She states she did not fall onto her abdomen. She states she has not noticed any abdominal or back pain or ctx since the fall happened. Good FM. No vaginal bleeding. No LOF. Prenatal care per Dr. Lito Donnelly and complicated by:  Hx. Of macrosomic infant  Retained IUD, unable to remove, left in situ  Chlamydia infection, treated, 3/7/23 DIMITRY negative  Anemia  Hx. Depression/ ADHD  Fetal ventricular septal defect, 23 u/s with no evidence VSD      Patient denies abdominal pain  , chest pain, fever, headache , nausea and vomiting, pelvic pressure, swelling, vaginal bleeding , and vaginal leaking of fluid .     OB History    Para Term  AB Living   4 2 2   1 2   SAB IAB Ectopic Molar Multiple Live Births   1         2      # Outcome Date GA Lbr Олег/2nd Weight Sex Delivery Anes PTL Lv   4 Current            3 SAB 2021           2 Term 19 40w3d  8 lb 11.9 oz (3.965 kg) F Vag-Spont EPI N ANG   1 Term 10/14/17 41w0d / 01:00 10 lb 13.9 oz (4.93 kg) M Vag-Spont  N ANG      Complications: Post-term pregnancy, 40-42 weeks of gestation     Past Medical History:   Diagnosis Date    ADHD (attention deficit hyperactivity disorder) 2015    Anemia during pregnancy in third trimester 2019    Condyloma acuminata 2017    noted     Depressive disorder 2015    Fetal ventricular septal defect affecting antepartum care of mother, fetus 1 2022    H/O seasonal allergies     Syncope and collapse 2015     Past Surgical History:   Procedure Laterality Date    APPENDECTOMY      HEMORRHOID SURGERY  2023     Social History     Occupational History    Not on file   Tobacco Use    Smoking status: Never    Smokeless tobacco: Never   Substance and Sexual Activity    Alcohol use: No    Drug use: No    Sexual activity: Yes     Partners: Male     Birth control/protection: I.U.D. Family History   Problem Relation Age of Onset    Colon Cancer Neg Hx     Uterine Cancer Neg Hx     Ovarian Cancer Neg Hx     Breast Cancer Neg Hx     No Known Problems Sister     Cancer Paternal Grandmother         brain    No Known Problems Maternal Grandmother     Depression Mother     Anxiety Disorder Mother     Other Mother         lymphoma    Seizures Father     Seizures Sister     Muscular Dystrophy Mother         diagnosed at age 17yr       Allergies   Allergen Reactions    Latex Rash     Prior to Admission medications    Medication Sig Start Date End Date Taking? Authorizing Provider   ferrous sulfate (IRON 325) 325 (65 Fe) MG tablet Take 1 tablet by mouth 2 times daily 2/3/23   ISAÍAS Bob CNP   ondansetron (ZOFRAN-ODT) 4 MG disintegrating tablet Take 1 tablet by mouth 3 times daily as needed for Nausea or Vomiting 22   Matteo Grace MD   Prenatal Vit-Fe Fumarate-FA (PRENATAL VITAMIN) 27-0.8 MG TABS Take 1 tablet by mouth daily 22   ISAÍAS Bob CNP        Review of Systems:  Complete review of systems performed. Those not specifically mentioned in the HPI are either negative are non related to this patient encounter.     Objective:     Vitals:    Vitals:    23 1239   BP: 117/70   Pulse: 98   Temp: 98.2 °F (36.8 °C)   TempSrc: Oral   SpO2: 98%      Temp (24hrs), Av.2 °F (36.8 °C), Min:98.2 °F (36.8 °C), Max:98.2 °F (36.8 °C)    BP  Min: 117/70  Max: 117/70       Physical Exam:  Heart: RRR  Lungs: cta bl  Adb: soft, nt nd, gravid, no bruising seen  Ext: no edema noted.  Superficial abrasions seen on right elbow and small bruise noted on lateral right thigh (1 cm size)  CVX: deferred  Membranes:  Intact  Uterine Activity:  None  Fetal Heart Rate:  Reactive  Baseline: 130s per minute  Variability: moderate  Accelerations: yes  Decelerations: none       Lab/Data Review:  No results found for this or any previous visit (from the past 24 hour(s)). Assessment and Plan:   33w6d with initial complaints of s/p fall. - Monitored for 2+ hours in triage with no contractions seen and reassuring FHR tracing  - Rh positive  - Will allow d/c home with abruption precautions, kick counts  - Keep next routine f/u appt at [facility] office. Patient was told to return to LD immediately if she experiences contractions in a pattern, loss of fluids, vaginal bleeding, sharp abdominal - back pain, or decreased Fetal movement.

## 2023-03-14 NOTE — PROGRESS NOTES
26 Pt arrives to [facility] ED with c/o fall. Triage assessment as documented. Pt has bruise on right  forearm and elbow. Denies pain, leaking or bleeding. Reports fetal movement.

## 2023-03-14 NOTE — DISCHARGE INSTRUCTIONS
Pregnancy Precautions: Care Instructions  Your Care Instructions     There is no sure way to prevent labor before your due date ( labor) or to prevent most other pregnancy problems. But there are things you can do to increase your chances of a healthy pregnancy. Go to your appointments, follow your doctor's advice, and take good care of yourself. Eat well, and exercise (if your doctor agrees). And make sure to drink plenty of water. Follow-up care is a key part of your treatment and safety. Be sure to make and go to all appointments, and call your doctor if you are having problems. It's also a good idea to know your test results and keep a list of the medicines you take. How can you care for yourself at home? Make sure you go to your prenatal appointments. At each visit, your doctor will check your blood pressure. Your doctor will also check to see if you have protein in your urine. High blood pressure and protein in urine are signs of preeclampsia. This condition can be dangerous for you and your baby. Drink plenty of fluids. Dehydration can cause contractions. If you have kidney, heart, or liver disease and have to limit fluids, talk with your doctor before you increase the amount of fluids you drink. Tell your doctor right away if you notice any symptoms of an infection, such as:  Burning when you urinate. A foul-smelling discharge from your vagina. Vaginal itching. Unexplained fever. Unusual pain or soreness in your uterus or lower belly. Eat a balanced diet. Include plenty of foods that are high in calcium and iron. Foods high in calcium include milk, cheese, yogurt, almonds, and broccoli. Foods high in iron include red meat, shellfish, poultry, eggs, beans, raisins, whole-grain bread, and leafy green vegetables. Do not smoke. If you need help quitting, talk to your doctor about stop-smoking programs and medicines. These can increase your chances of quitting for good.   Do not drink alcohol or use marijuana or illegal drugs. Follow your doctor's directions about activity. Your doctor will let you know how much, if any, exercise you can do. Ask your doctor if you can have sex. If you are at risk for early labor, your doctor may ask you to not have sex. Take care to prevent falls. During pregnancy, your joints are loose, and your balance is off. Sports such as bicycling, skiing, or in-line skating can increase your risk of falling. And don't ride horses or motorcycles, dive, water ski, scuba dive, or parachute jump while you are pregnant. Avoid things that can make your body too hot and may be harmful to your baby, such as a hot tub or sauna. Or talk with your doctor before doing anything that raises your body temperature. Your doctor can tell you if it's safe. Do not take any over-the-counter or herbal medicines or supplements without talking to your doctor or pharmacist first.  When should you call for help? Call 911  anytime you think you may need emergency care. For example, call if:    You passed out (lost consciousness). You have a seizure. You have severe vaginal bleeding. You have severe pain in your belly or pelvis. You have had fluid gushing or leaking from your vagina and you know or think the umbilical cord is bulging into your vagina. If this happens, immediately get down on your knees so your rear end (buttocks) is higher than your head. This will decrease the pressure on the cord until help arrives. Call your doctor now or seek immediate medical care if:    You have signs of preeclampsia, such as:  Sudden swelling of your face, hands, or feet. New vision problems (such as dimness, blurring, or seeing spots). A severe headache. You have any vaginal bleeding. You have belly pain or cramping. You have a fever. You have had regular contractions (with or without pain) for an hour.  This means that you have 8 or more within 1 hour or 4 or more in 20 minutes after you change your position and drink fluids. You have a sudden release of fluid from your vagina. You have low back pain or pelvic pressure that does not go away. You notice that your baby has stopped moving or is moving much less than normal.   Watch closely for changes in your health, and be sure to contact your doctor if you have any problems. Where can you learn more? Go to http://www.woods.com/ and enter Y951 to learn more about \"Pregnancy Precautions: Care Instructions. \"  Current as of: February 23, 2022               Content Version: 13.5  © 2491-0371 [employer]. Care instructions adapted under license by [facility] ([facility]). If you have questions about a medical condition or this instruction, always ask your healthcare professional. [address] any warranty or liability for your use of this information.

## 2023-03-20 ENCOUNTER — ROUTINE PRENATAL (OUTPATIENT)
Dept: OBGYN CLINIC | Age: 23
End: 2023-03-20
Payer: COMMERCIAL

## 2023-03-20 VITALS
SYSTOLIC BLOOD PRESSURE: 122 MMHG | DIASTOLIC BLOOD PRESSURE: 74 MMHG | BODY MASS INDEX: 28.61 KG/M2 | HEIGHT: 66 IN | WEIGHT: 178 LBS

## 2023-03-20 DIAGNOSIS — O99.013 ANEMIA AFFECTING PREGNANCY IN THIRD TRIMESTER: ICD-10-CM

## 2023-03-20 DIAGNOSIS — O26.32 RETAINED INTRAUTERINE DEVICE (IUD) DURING PREGNANCY IN SECOND TRIMESTER: ICD-10-CM

## 2023-03-20 DIAGNOSIS — O98.813 CHLAMYDIA INFECTION AFFECTING PREGNANCY IN THIRD TRIMESTER: ICD-10-CM

## 2023-03-20 DIAGNOSIS — O35.BXX1 FETAL VENTRICULAR SEPTAL DEFECT AFFECTING ANTEPARTUM CARE OF MOTHER, FETUS 1: ICD-10-CM

## 2023-03-20 DIAGNOSIS — Z87.59 HISTORY OF DELIVERY OF MACROSOMAL INFANT: Primary | ICD-10-CM

## 2023-03-20 DIAGNOSIS — Z34.83 MULTIGRAVIDA IN THIRD TRIMESTER: ICD-10-CM

## 2023-03-20 DIAGNOSIS — A74.9 CHLAMYDIA INFECTION AFFECTING PREGNANCY IN THIRD TRIMESTER: ICD-10-CM

## 2023-03-20 PROCEDURE — 99213 OFFICE O/P EST LOW 20 MIN: CPT | Performed by: NURSE PRACTITIONER

## 2023-03-20 PROCEDURE — 76816 OB US FOLLOW-UP PER FETUS: CPT | Performed by: NURSE PRACTITIONER

## 2023-03-20 ASSESSMENT — PATIENT HEALTH QUESTIONNAIRE - PHQ9
2. FEELING DOWN, DEPRESSED OR HOPELESS: 0
SUM OF ALL RESPONSES TO PHQ9 QUESTIONS 1 & 2: 0
SUM OF ALL RESPONSES TO PHQ QUESTIONS 1-9: 0
1. LITTLE INTEREST OR PLEASURE IN DOING THINGS: 0
SUM OF ALL RESPONSES TO PHQ QUESTIONS 1-9: 0

## 2023-03-20 NOTE — PATIENT INSTRUCTIONS
Thank you for coming. Return to the office in 2 weeks. Please call if you have any abdominal pain and 5 contractions in 1 hour, vaginal bleeding or spotting, or leaking of fluid. You should feel the baby move 10 times in an hour. Monitor this once a day. If you do not feel the baby move this often please call. Please call immediately if you have a headache that won't go away with tylenol, changes to your vision like funny lights or blurriness, nausea or vomiting, upper abdominal pain, or if the baby does not move at least 10 times in 2 hours. FEVER

## 2023-03-20 NOTE — ASSESSMENT & PLAN NOTE
PTL/labor precautions, [address], and pregnancy warning signs reviewed. Pt advised to call the office at [phone] or go straight to Labor and Delivery at [facility] with any of the following concerns vaginal bleeding, leaking of fluid, zeeshan regularly Q 5-7 minutes for over an hour or not feeling the baby move.    RTO 2 weeks OBV, GBS, cbc, std screening

## 2023-03-21 ENCOUNTER — TELEPHONE (OUTPATIENT)
Dept: OBGYN CLINIC | Age: 23
End: 2023-03-21

## 2023-03-21 ENCOUNTER — PATIENT MESSAGE (OUTPATIENT)
Dept: OBGYN CLINIC | Age: 23
End: 2023-03-21

## 2023-03-21 DIAGNOSIS — Z34.83 MULTIGRAVIDA IN THIRD TRIMESTER: Primary | ICD-10-CM

## 2023-03-21 DIAGNOSIS — L29.9 ITCHING: ICD-10-CM

## 2023-03-21 NOTE — TELEPHONE ENCOUNTER
Amrita,     Patient complains of:    \"Is it possible to get my iron checked again before my next appointment? Jagdeep noticed that at night I get really itchy and my legs start to feel heavy and tingle. I read that that can be caused by iron deficiency which I know I had but Clarington Jorge been taking the iron regularly. \"    Recommendations?

## 2023-03-21 NOTE — TELEPHONE ENCOUNTER
RN called patient, patient verified Sarah Patel stated to patient that blood work has been ordered for her and that a fasting lab appointment needs to be scheduled. Patient verbalized understanding and booked appointment for 3/22/23 at 8:35am and repeated the need to be fasting back to RN. RN confirmed no food, water, or drinks after midnight tonight.

## 2023-03-21 NOTE — TELEPHONE ENCOUNTER
----- Message from Sravanthi Hsieh sent at 3/21/2023  6:56 AM EDT -----  Regarding: iron  Is it possible to get my iron checked again before my next appointment? Boazmariel noticed that at night I get really itchy and my legs start to feel heavy and tingle. I read that that can be caused by iron deficiency which I know I had but Domenico Ko been taking the iron regularly.

## 2023-03-21 NOTE — TELEPHONE ENCOUNTER
From: Khushi Juárez  To: Ben Dawson  Sent: 3/21/2023 6:56 AM EDT  Subject: iron    Is it possible to get my iron checked again before my next appointment? Jagdeep noticed that at night I get really itchy and my legs start to feel heavy and tingle. I read that that can be caused by iron deficiency which I know I had but Gregorio Gillespiews been taking the iron regularly.

## 2023-03-22 DIAGNOSIS — L29.9 ITCHING: ICD-10-CM

## 2023-03-22 DIAGNOSIS — Z34.83 MULTIGRAVIDA IN THIRD TRIMESTER: ICD-10-CM

## 2023-03-22 LAB
ERYTHROCYTE [DISTWIDTH] IN BLOOD BY AUTOMATED COUNT: 13.4 % (ref 11.9–14.6)
HCT VFR BLD AUTO: 30.1 % (ref 35.8–46.3)
HGB BLD-MCNC: 9.2 G/DL (ref 11.7–15.4)
MCH RBC QN AUTO: 26.4 PG (ref 26.1–32.9)
MCHC RBC AUTO-ENTMCNC: 30.6 G/DL (ref 31.4–35)
MCV RBC AUTO: 86.2 FL (ref 82–102)
NRBC # BLD: 0 K/UL (ref 0–0.2)
PLATELET # BLD AUTO: 203 K/UL (ref 150–450)
PMV BLD AUTO: 11.7 FL (ref 9.4–12.3)
RBC # BLD AUTO: 3.49 M/UL (ref 4.05–5.2)
WBC # BLD AUTO: 8.7 K/UL (ref 4.3–11.1)

## 2023-03-22 NOTE — PROGRESS NOTES
I have reviewed the patient's visit today including history, exam and assessment by MARC White. I agree with treatment/plan as above.     Sarita Albarado MD  11:40 AM  03/22/23
Patient comes in today for routine prenatal visit. No complaints/concerns today.      Fetal Movement: Yes  Contractions: No  Vaginal Bleeding: No  Leaking Fluid: No  GI/: No    Vitals:    03/20/23 1016   BP: 122/74   Site: Left Upper Arm   Position: Sitting   Weight: 178 lb (80.7 kg)   Height: 5' 6\" (1.676 m)
Objective    Vitals:    03/20/23 1016   BP: 122/74   Site: Left Upper Arm   Position: Sitting   Weight: 178 lb (80.7 kg)   Height: 5' 6\" (1.676 m)       General: well developed, well nourished, in no acute distress    Head: normocephalic and atraumatic    Resp: even and unlabored    Psych: Normal mood and affect        Assessment and Plan      Patient Active Problem List    Diagnosis Date Noted    Chlamydia infection affecting pregnancy in third trimester 02/06/2023     Priority: Medium     Overview Note:     2/6/2023: Dx chlamydia, plan DIMITRY in 4 weeks    3/7/2023: DIMITRY negative       Assessment & Plan Note:     noted      Anemia affecting pregnancy in third trimester 02/03/2023     Priority: Medium     Overview Note:     Add'l iron       Assessment & Plan Note:     noted      Labial swelling 02/02/2023     Priority: Medium    Fetal ventricular septal defect affecting antepartum care of mother, fetus 1 12/06/2022     Priority: Medium     Overview Note:     12/6/22: ? Small VSD, will recheck in 4 weeks  1/3/23:  EFW 98%, AC 98%, HODA 15.4 cm, vtx, no evidence of VSD  2/17/2023: EFW >99%, AC >99%, HODA nl, vertex EFW 5lb4oz, again discuss limiting carbs  3/20/2023: EFW >99%, AC 96.2%, HODA nl, vertex       Assessment & Plan Note:     noted      Multigravida in third trimester 09/01/2022     Priority: Medium     Overview Note:     EDC by 6 1/7 week  -- unknown LMP with IUD in-situ    5/9/2019: CF/SMA negative (prev pregnancy)  09/29/22: NIPT negx3, female  2/2/23: declines tdap today       Assessment & Plan Note:     PTL/labor precautions, [address], and pregnancy warning signs reviewed. Pt advised to call the office at [phone] or go straight to [facility] Delivery at [facility] with any of the following concerns vaginal bleeding, leaking of fluid, zeeshan regularly Q 5-7 minutes for over an hour or not feeling the baby move.    RTO 2 weeks OBV, GBS, cbc, std screening      Retained intrauterine

## 2023-03-23 ENCOUNTER — TELEPHONE (OUTPATIENT)
Dept: OBGYN CLINIC | Age: 23
End: 2023-03-23

## 2023-03-23 DIAGNOSIS — O99.013 ANEMIA COMPLICATING PREGNANCY, THIRD TRIMESTER: Primary | ICD-10-CM

## 2023-03-23 DIAGNOSIS — O99.013 ANEMIA COMPLICATING PREGNANCY, THIRD TRIMESTER: ICD-10-CM

## 2023-03-23 LAB
ALBUMIN SERPL-MCNC: 2.7 G/DL (ref 3.5–5)
ALBUMIN/GLOB SERPL: 0.8 (ref 0.4–1.6)
ALP SERPL-CCNC: 104 U/L (ref 50–136)
ALT SERPL-CCNC: 13 U/L (ref 12–65)
ANION GAP SERPL CALC-SCNC: 6 MMOL/L (ref 2–11)
AST SERPL-CCNC: 12 U/L (ref 15–37)
BILE AC SERPL-SCNC: 2 UMOL/L (ref 0–10)
BILIRUB SERPL-MCNC: 0.4 MG/DL (ref 0.2–1.1)
BUN SERPL-MCNC: 7 MG/DL (ref 6–23)
CALCIUM SERPL-MCNC: 8.6 MG/DL (ref 8.3–10.4)
CHLORIDE SERPL-SCNC: 111 MMOL/L (ref 101–110)
CO2 SERPL-SCNC: 23 MMOL/L (ref 21–32)
CREAT SERPL-MCNC: 0.4 MG/DL (ref 0.6–1)
ERYTHROCYTE [DISTWIDTH] IN BLOOD BY AUTOMATED COUNT: 13.5 % (ref 11.9–14.6)
FERRITIN SERPL-MCNC: 4 NG/ML (ref 8–388)
FOLATE SERPL-MCNC: 10.3 NG/ML (ref 3.1–17.5)
GLOBULIN SER CALC-MCNC: 3.2 G/DL (ref 2.8–4.5)
GLUCOSE SERPL-MCNC: 86 MG/DL (ref 65–100)
HCT VFR BLD AUTO: 29.4 % (ref 35.8–46.3)
HGB BLD-MCNC: 8.8 G/DL (ref 11.7–15.4)
HGB RETIC QN AUTO: 23 PG (ref 29–35)
IMM RETICS NFR: 30.3 % (ref 3–15.9)
IRON SATN MFR SERPL: 5 %
IRON SERPL-MCNC: 28 UG/DL (ref 35–150)
MCH RBC QN AUTO: 26 PG (ref 26.1–32.9)
MCHC RBC AUTO-ENTMCNC: 29.9 G/DL (ref 31.4–35)
MCV RBC AUTO: 86.7 FL (ref 82–102)
NRBC # BLD: 0 K/UL (ref 0–0.2)
PLATELET # BLD AUTO: 194 K/UL (ref 150–450)
PMV BLD AUTO: 11.6 FL (ref 9.4–12.3)
POTASSIUM SERPL-SCNC: 4 MMOL/L (ref 3.5–5.1)
PROT SERPL-MCNC: 5.9 G/DL (ref 6.3–8.2)
RBC # BLD AUTO: 3.39 M/UL (ref 4.05–5.2)
RETICS # AUTO: 0.09 M/UL (ref 0.03–0.1)
RETICS/RBC NFR AUTO: 2.6 % (ref 0.3–2)
SODIUM SERPL-SCNC: 140 MMOL/L (ref 133–143)
TIBC SERPL-MCNC: 519 UG/DL (ref 250–450)
VIT B12 SERPL-MCNC: 106 PG/ML (ref 193–986)
WBC # BLD AUTO: 8.4 K/UL (ref 4.3–11.1)

## 2023-03-23 NOTE — TELEPHONE ENCOUNTER
RN called patient, patient verified Lizet Bills stated to patient that their hemoglobin did drop more and verified with patient that she is taking 2 iron pills a day everyday, patient confirms. RN asks patient to come in for additional anemia labs today if possible and that a referral for an iron transfusion may be done if labs are still showing low iron. Patient verbalizes understanding and booked lab appointment for 11:35am today. RN also stated to patient that their other liver and bile acid labs are still pending and will called if they are abnormal, patient verbalizes understanding.

## 2023-03-23 NOTE — TELEPHONE ENCOUNTER
Please let pt know liver enzymes nl, bile acids pending (I will call her if abnormal)    She is more anemic. Is she taking her Iron? Can she come to office today for add'l anemia labs.  We may need to send her for an iron infusion if indicated

## 2023-03-24 ENCOUNTER — TELEPHONE (OUTPATIENT)
Dept: OBGYN CLINIC | Age: 23
End: 2023-03-24

## 2023-03-24 DIAGNOSIS — E53.8 VITAMIN B12 DEFICIENCY: ICD-10-CM

## 2023-03-24 NOTE — TELEPHONE ENCOUNTER
Vit B12 is low. Pt needs to start 1000 mcg daily (can get OTC)  Iron stores are very low. I am sending referral for iron infusion. Please call and schedule infusions as we are getting close to due date.

## 2023-03-24 NOTE — TELEPHONE ENCOUNTER
Patient returned my call. She was informed of results and recommendations. She was advised of her appointments for the iron infusion. Patient voiced understanding. jonh

## 2023-03-24 NOTE — TELEPHONE ENCOUNTER
CASANDRA for patient to call the office. No details given.      When patient calls back she needs to be informed of the information from Amrita and that she is scheduled for iron infusion on 03/31 @ 1:30 pm and 04/07 @ 8 am.

## 2023-03-31 ENCOUNTER — HOSPITAL ENCOUNTER (OUTPATIENT)
Dept: INFUSION THERAPY | Age: 23
Discharge: HOME | End: 2023-03-31
Payer: COMMERCIAL

## 2023-03-31 VITALS
DIASTOLIC BLOOD PRESSURE: 65 MMHG | RESPIRATION RATE: 16 BRPM | SYSTOLIC BLOOD PRESSURE: 100 MMHG | TEMPERATURE: 97.9 F | HEART RATE: 75 BPM | OXYGEN SATURATION: 97 %

## 2023-03-31 PROCEDURE — 2580000003 HC RX 258: Performed by: NURSE PRACTITIONER

## 2023-03-31 PROCEDURE — 2580000003 HC RX 258: Performed by: INTERNAL MEDICINE

## 2023-03-31 PROCEDURE — 96365 THER/PROPH/DIAG IV INF INIT: CPT

## 2023-03-31 PROCEDURE — 6360000002 HC RX W HCPCS: Performed by: NURSE PRACTITIONER

## 2023-03-31 RX ORDER — ALBUTEROL SULFATE 90 UG/1
4 AEROSOL, METERED RESPIRATORY (INHALATION) PRN
Status: DISCONTINUED | OUTPATIENT
Start: 2023-03-31 | End: 2023-04-01 | Stop reason: HOSPADM

## 2023-03-31 RX ORDER — SODIUM CHLORIDE 0.9 % (FLUSH) 0.9 %
10 SYRINGE (ML) INJECTION PRN
Status: DISCONTINUED | OUTPATIENT
Start: 2023-03-31 | End: 2023-04-01 | Stop reason: HOSPADM

## 2023-03-31 RX ORDER — ACETAMINOPHEN 325 MG/1
650 TABLET ORAL
Status: DISCONTINUED | OUTPATIENT
Start: 2023-03-31 | End: 2023-04-01 | Stop reason: HOSPADM

## 2023-03-31 RX ORDER — ONDANSETRON 2 MG/ML
8 INJECTION INTRAMUSCULAR; INTRAVENOUS
Status: DISCONTINUED | OUTPATIENT
Start: 2023-03-31 | End: 2023-04-01 | Stop reason: HOSPADM

## 2023-03-31 RX ORDER — SODIUM CHLORIDE 9 MG/ML
100 INJECTION, SOLUTION INTRAVENOUS CONTINUOUS
Status: DISCONTINUED | OUTPATIENT
Start: 2023-03-31 | End: 2023-04-01 | Stop reason: HOSPADM

## 2023-03-31 RX ORDER — DIPHENHYDRAMINE HYDROCHLORIDE 50 MG/ML
50 INJECTION INTRAMUSCULAR; INTRAVENOUS
Status: DISCONTINUED | OUTPATIENT
Start: 2023-03-31 | End: 2023-04-01 | Stop reason: HOSPADM

## 2023-03-31 RX ORDER — EPINEPHRINE 1 MG/ML
0.3 INJECTION, SOLUTION, CONCENTRATE INTRAVENOUS PRN
Status: DISCONTINUED | OUTPATIENT
Start: 2023-03-31 | End: 2023-04-01 | Stop reason: HOSPADM

## 2023-03-31 RX ADMIN — FERRIC CARBOXYMALTOSE INJECTION 750 MG: 50 INJECTION, SOLUTION INTRAVENOUS at 14:32

## 2023-03-31 RX ADMIN — SODIUM CHLORIDE, PRESERVATIVE FREE 10 ML: 5 INJECTION INTRAVENOUS at 14:30

## 2023-04-04 ENCOUNTER — ROUTINE PRENATAL (OUTPATIENT)
Dept: OBGYN CLINIC | Age: 23
End: 2023-04-04
Payer: COMMERCIAL

## 2023-04-04 VITALS
HEIGHT: 66 IN | BODY MASS INDEX: 29.25 KG/M2 | SYSTOLIC BLOOD PRESSURE: 120 MMHG | DIASTOLIC BLOOD PRESSURE: 68 MMHG | WEIGHT: 182 LBS

## 2023-04-04 DIAGNOSIS — O98.813 CHLAMYDIA INFECTION AFFECTING PREGNANCY IN THIRD TRIMESTER: ICD-10-CM

## 2023-04-04 DIAGNOSIS — Z87.59 HISTORY OF DELIVERY OF MACROSOMAL INFANT: Primary | ICD-10-CM

## 2023-04-04 DIAGNOSIS — O99.013 ANEMIA AFFECTING PREGNANCY IN THIRD TRIMESTER: ICD-10-CM

## 2023-04-04 DIAGNOSIS — A74.9 CHLAMYDIA INFECTION AFFECTING PREGNANCY IN THIRD TRIMESTER: ICD-10-CM

## 2023-04-04 DIAGNOSIS — O35.BXX1 FETAL VENTRICULAR SEPTAL DEFECT AFFECTING ANTEPARTUM CARE OF MOTHER, FETUS 1: ICD-10-CM

## 2023-04-04 DIAGNOSIS — Z34.83 MULTIGRAVIDA IN THIRD TRIMESTER: ICD-10-CM

## 2023-04-04 DIAGNOSIS — O26.32 RETAINED INTRAUTERINE DEVICE (IUD) DURING PREGNANCY IN SECOND TRIMESTER: ICD-10-CM

## 2023-04-04 PROBLEM — N94.89 LABIAL SWELLING: Status: RESOLVED | Noted: 2023-02-02 | Resolved: 2023-04-04

## 2023-04-04 PROCEDURE — 99213 OFFICE O/P EST LOW 20 MIN: CPT | Performed by: OBSTETRICS & GYNECOLOGY

## 2023-04-04 ASSESSMENT — PATIENT HEALTH QUESTIONNAIRE - PHQ9
SUM OF ALL RESPONSES TO PHQ QUESTIONS 1-9: 0
SUM OF ALL RESPONSES TO PHQ QUESTIONS 1-9: 0
2. FEELING DOWN, DEPRESSED OR HOPELESS: 0
SUM OF ALL RESPONSES TO PHQ QUESTIONS 1-9: 0
1. LITTLE INTEREST OR PLEASURE IN DOING THINGS: 0
SUM OF ALL RESPONSES TO PHQ QUESTIONS 1-9: 0
SUM OF ALL RESPONSES TO PHQ9 QUESTIONS 1 & 2: 0

## 2023-04-04 NOTE — PROGRESS NOTES
Patient comes in today for routine prenatal visit. No complaints/concerns today.      Fetal Movement: Yes  Contractions: No  Vaginal Bleeding: No  Leaking Fluid: No  GI/: No    Vitals:    04/04/23 1020   BP: 120/68   Site: Left Upper Arm   Position: Sitting   Weight: 182 lb (82.6 kg)   Height: 5' 6\" (1.676 m)

## 2023-04-05 LAB
HIV 1+2 AB+HIV1 P24 AG SERPL QL IA: NONREACTIVE
HIV 1/2 RESULT COMMENT: NORMAL
RPR SER QL: NONREACTIVE

## 2023-04-08 LAB
BACTERIA SPEC CULT: NORMAL
SERVICE CMNT-IMP: NORMAL

## 2023-04-09 LAB
HBV SURFACE AG SER QL: NONREACTIVE
HCV AB SER QL: NONREACTIVE

## 2023-04-11 ENCOUNTER — HOSPITAL ENCOUNTER (OUTPATIENT)
Dept: INFUSION THERAPY | Age: 23
Discharge: HOME | End: 2023-04-11
Payer: COMMERCIAL

## 2023-04-11 VITALS
TEMPERATURE: 98.2 F | OXYGEN SATURATION: 98 % | RESPIRATION RATE: 16 BRPM | HEART RATE: 93 BPM | SYSTOLIC BLOOD PRESSURE: 100 MMHG | DIASTOLIC BLOOD PRESSURE: 64 MMHG

## 2023-04-11 PROCEDURE — 96365 THER/PROPH/DIAG IV INF INIT: CPT

## 2023-04-11 PROCEDURE — 2580000003 HC RX 258: Performed by: NURSE PRACTITIONER

## 2023-04-11 PROCEDURE — 6360000002 HC RX W HCPCS: Performed by: NURSE PRACTITIONER

## 2023-04-11 RX ORDER — SODIUM CHLORIDE 0.9 % (FLUSH) 0.9 %
5-40 SYRINGE (ML) INJECTION PRN
Status: DISCONTINUED | OUTPATIENT
Start: 2023-04-11 | End: 2023-04-12 | Stop reason: HOSPADM

## 2023-04-11 RX ADMIN — FERRIC CARBOXYMALTOSE INJECTION 750 MG: 50 INJECTION, SOLUTION INTRAVENOUS at 10:20

## 2023-04-11 RX ADMIN — SODIUM CHLORIDE, PRESERVATIVE FREE 10 ML: 5 INJECTION INTRAVENOUS at 10:19

## 2023-04-11 RX ADMIN — SODIUM CHLORIDE, PRESERVATIVE FREE 10 ML: 5 INJECTION INTRAVENOUS at 11:09

## 2023-04-11 NOTE — PROGRESS NOTES
Arrived to the [facility]. Injectafer completed. Patient tolerated well. Any issues or concerns during appointment: none. Patient instructed to call provider with temperature of 100.4 or greater or nausea/vomiting/ diarrhea or pain not controlled by medications  Discharged ambulatory.

## 2023-04-19 ENCOUNTER — ROUTINE PRENATAL (OUTPATIENT)
Dept: OBGYN CLINIC | Age: 23
End: 2023-04-19

## 2023-04-19 ENCOUNTER — HOSPITAL ENCOUNTER (INPATIENT)
Age: 23
LOS: 3 days | Discharge: HOME | End: 2023-04-22
Attending: OBSTETRICS & GYNECOLOGY | Admitting: OBSTETRICS & GYNECOLOGY
Payer: COMMERCIAL

## 2023-04-19 VITALS
BODY MASS INDEX: 29.57 KG/M2 | WEIGHT: 184 LBS | SYSTOLIC BLOOD PRESSURE: 128 MMHG | HEIGHT: 66 IN | DIASTOLIC BLOOD PRESSURE: 68 MMHG

## 2023-04-19 DIAGNOSIS — Z34.83 MULTIGRAVIDA IN THIRD TRIMESTER: ICD-10-CM

## 2023-04-19 DIAGNOSIS — A74.9 CHLAMYDIA INFECTION AFFECTING PREGNANCY IN THIRD TRIMESTER: ICD-10-CM

## 2023-04-19 DIAGNOSIS — O40.3XX0 POLYHYDRAMNIOS AFFECTING PREGNANCY IN THIRD TRIMESTER: ICD-10-CM

## 2023-04-19 DIAGNOSIS — O98.813 CHLAMYDIA INFECTION AFFECTING PREGNANCY IN THIRD TRIMESTER: ICD-10-CM

## 2023-04-19 DIAGNOSIS — O36.63X0 MACROSOMIA OF FETUS AFFECTING MANAGEMENT OF MOTHER IN THIRD TRIMESTER, SINGLE OR UNSPECIFIED FETUS: Primary | ICD-10-CM

## 2023-04-19 DIAGNOSIS — Z87.59 HISTORY OF DELIVERY OF MACROSOMAL INFANT: ICD-10-CM

## 2023-04-19 DIAGNOSIS — O99.013 ANEMIA AFFECTING PREGNANCY IN THIRD TRIMESTER: ICD-10-CM

## 2023-04-19 DIAGNOSIS — O26.32 RETAINED INTRAUTERINE DEVICE (IUD) DURING PREGNANCY IN SECOND TRIMESTER: ICD-10-CM

## 2023-04-19 DIAGNOSIS — O35.BXX1 FETAL VENTRICULAR SEPTAL DEFECT AFFECTING ANTEPARTUM CARE OF MOTHER, FETUS 1: ICD-10-CM

## 2023-04-19 PROBLEM — Z3A.39 39 WEEKS GESTATION OF PREGNANCY: Status: ACTIVE | Noted: 2023-04-19

## 2023-04-19 LAB
BASOPHILS # BLD: 0 K/UL (ref 0–0.2)
BASOPHILS NFR BLD: 0 % (ref 0–2)
DIFFERENTIAL METHOD BLD: ABNORMAL
EOSINOPHIL # BLD: 0.3 K/UL (ref 0–0.8)
EOSINOPHIL NFR BLD: 3 % (ref 0.5–7.8)
ERYTHROCYTE [DISTWIDTH] IN BLOOD BY AUTOMATED COUNT: 21.9 % (ref 11.9–14.6)
HCT VFR BLD AUTO: 33.4 % (ref 35.8–46.3)
HGB BLD-MCNC: 10.5 G/DL (ref 11.7–15.4)
IMM GRANULOCYTES # BLD AUTO: 0.1 K/UL (ref 0–0.5)
IMM GRANULOCYTES NFR BLD AUTO: 1 % (ref 0–5)
LYMPHOCYTES # BLD: 2.6 K/UL (ref 0.5–4.6)
LYMPHOCYTES NFR BLD: 27 % (ref 13–44)
MCH RBC QN AUTO: 27.8 PG (ref 26.1–32.9)
MCHC RBC AUTO-ENTMCNC: 31.4 G/DL (ref 31.4–35)
MCV RBC AUTO: 88.4 FL (ref 82–102)
MONOCYTES # BLD: 0.3 K/UL (ref 0.1–1.3)
MONOCYTES NFR BLD: 3 % (ref 4–12)
NEUTS SEG # BLD: 6.3 K/UL (ref 1.7–8.2)
NEUTS SEG NFR BLD: 66 % (ref 43–78)
NRBC # BLD: 0 K/UL (ref 0–0.2)
PLATELET # BLD AUTO: 213 K/UL (ref 150–450)
PMV BLD AUTO: 10.9 FL (ref 9.4–12.3)
RBC # BLD AUTO: 3.78 M/UL (ref 4.05–5.2)
WBC # BLD AUTO: 9.6 K/UL (ref 4.3–11.1)

## 2023-04-19 PROCEDURE — 86900 BLOOD TYPING SEROLOGIC ABO: CPT

## 2023-04-19 PROCEDURE — 1100000000 HC RM PRIVATE

## 2023-04-19 PROCEDURE — 86850 RBC ANTIBODY SCREEN: CPT

## 2023-04-19 PROCEDURE — 85025 COMPLETE CBC W/AUTO DIFF WBC: CPT

## 2023-04-19 PROCEDURE — 86901 BLOOD TYPING SEROLOGIC RH(D): CPT

## 2023-04-19 RX ORDER — SODIUM CHLORIDE 9 MG/ML
25 INJECTION, SOLUTION INTRAVENOUS PRN
Status: DISCONTINUED | OUTPATIENT
Start: 2023-04-19 | End: 2023-04-21

## 2023-04-19 RX ORDER — SODIUM CHLORIDE, SODIUM LACTATE, POTASSIUM CHLORIDE, AND CALCIUM CHLORIDE .6; .31; .03; .02 G/100ML; G/100ML; G/100ML; G/100ML
500 INJECTION, SOLUTION INTRAVENOUS PRN
Status: DISCONTINUED | OUTPATIENT
Start: 2023-04-19 | End: 2023-04-21

## 2023-04-19 RX ORDER — CALCIUM CARBONATE 200(500)MG
1 TABLET,CHEWABLE ORAL DAILY
COMMUNITY

## 2023-04-19 RX ORDER — SODIUM CHLORIDE, SODIUM LACTATE, POTASSIUM CHLORIDE, AND CALCIUM CHLORIDE .6; .31; .03; .02 G/100ML; G/100ML; G/100ML; G/100ML
1000 INJECTION, SOLUTION INTRAVENOUS PRN
Status: DISCONTINUED | OUTPATIENT
Start: 2023-04-19 | End: 2023-04-21

## 2023-04-19 RX ORDER — METHYLERGONOVINE MALEATE 0.2 MG/ML
200 INJECTION INTRAVENOUS
Status: DISCONTINUED | OUTPATIENT
Start: 2023-04-19 | End: 2023-04-20

## 2023-04-19 RX ORDER — TRANEXAMIC ACID 10 MG/ML
1000 INJECTION, SOLUTION INTRAVENOUS
Status: ACTIVE | OUTPATIENT
Start: 2023-04-19 | End: 2023-04-20

## 2023-04-19 RX ORDER — ONDANSETRON 2 MG/ML
4 INJECTION INTRAMUSCULAR; INTRAVENOUS EVERY 6 HOURS PRN
Status: DISCONTINUED | OUTPATIENT
Start: 2023-04-19 | End: 2023-04-20

## 2023-04-19 RX ORDER — TERBUTALINE SULFATE 1 MG/ML
0.25 INJECTION, SOLUTION SUBCUTANEOUS
Status: ACTIVE | OUTPATIENT
Start: 2023-04-19 | End: 2023-04-20

## 2023-04-19 RX ORDER — MISOPROSTOL 200 UG/1
800 TABLET ORAL
Status: COMPLETED | OUTPATIENT
Start: 2023-04-19 | End: 2023-04-20

## 2023-04-19 RX ORDER — DEXTROSE, SODIUM CHLORIDE, SODIUM LACTATE, POTASSIUM CHLORIDE, AND CALCIUM CHLORIDE 5; .6; .31; .03; .02 G/100ML; G/100ML; G/100ML; G/100ML; G/100ML
INJECTION, SOLUTION INTRAVENOUS CONTINUOUS
Status: DISCONTINUED | OUTPATIENT
Start: 2023-04-19 | End: 2023-04-21

## 2023-04-19 ASSESSMENT — PATIENT HEALTH QUESTIONNAIRE - PHQ9
1. LITTLE INTEREST OR PLEASURE IN DOING THINGS: 0
SUM OF ALL RESPONSES TO PHQ QUESTIONS 1-9: 0
SUM OF ALL RESPONSES TO PHQ QUESTIONS 1-9: 0
SUM OF ALL RESPONSES TO PHQ9 QUESTIONS 1 & 2: 0
2. FEELING DOWN, DEPRESSED OR HOPELESS: 0
SUM OF ALL RESPONSES TO PHQ QUESTIONS 1-9: 0
SUM OF ALL RESPONSES TO PHQ QUESTIONS 1-9: 0

## 2023-04-19 NOTE — PROGRESS NOTES
Here for induction of labor at 39 weeks for suspected macrosomia. Pt states EFW is >11 lbs. Has proven to 10 lbs with first delivery. Feels like this is a big baby. Assessment completed. Iv hepwelled. Labs drawn and sent  Pt would like an epidural. Breastfeeding  States she has been getting iron infusions. Last one last week  Gbs negative    Reactive nst and efm removed. Sbar to [facility], [address] [place] [address]. Pt understanding that cytotec will be given when unit census settles down later tonight. Significant other at bs and also understanding. No complaints at this time.

## 2023-04-19 NOTE — ASSESSMENT & PLAN NOTE
PTL/labor precautions, [address], and pregnancy warning signs reviewed. Pt advised to call the office at [phone] or go straight to Labor and Delivery at [facility] with any of the following concerns vaginal bleeding, leaking of fluid, zeeshan regularly Q 5-7 minutes for over an hour or not feeling the baby move.    RTO 6 wk PPV normal...

## 2023-04-20 ENCOUNTER — ANESTHESIA (OUTPATIENT)
Dept: LABOR AND DELIVERY | Age: 23
End: 2023-04-20
Payer: COMMERCIAL

## 2023-04-20 ENCOUNTER — ANESTHESIA EVENT (OUTPATIENT)
Dept: LABOR AND DELIVERY | Age: 23
End: 2023-04-20
Payer: COMMERCIAL

## 2023-04-20 LAB
ABO + RH BLD: NORMAL
BLOOD GROUP ANTIBODIES SERPL: NORMAL
SPECIMEN EXP DATE BLD: NORMAL

## 2023-04-20 PROCEDURE — 51701 INSERT BLADDER CATHETER: CPT

## 2023-04-20 PROCEDURE — 7100000011 HC PHASE II RECOVERY - ADDTL 15 MIN

## 2023-04-20 PROCEDURE — 7210000100 HC LABOR FEE PER 1 HR

## 2023-04-20 PROCEDURE — 10907ZC DRAINAGE OF AMNIOTIC FLUID, THERAPEUTIC FROM PRODUCTS OF CONCEPTION, VIA NATURAL OR ARTIFICIAL OPENING: ICD-10-PCS | Performed by: OBSTETRICS & GYNECOLOGY

## 2023-04-20 PROCEDURE — 1100000000 HC RM PRIVATE

## 2023-04-20 PROCEDURE — 6360000002 HC RX W HCPCS: Performed by: OBSTETRICS & GYNECOLOGY

## 2023-04-20 PROCEDURE — 6370000000 HC RX 637 (ALT 250 FOR IP): Performed by: OBSTETRICS & GYNECOLOGY

## 2023-04-20 PROCEDURE — 6360000002 HC RX W HCPCS: Performed by: REGISTERED NURSE

## 2023-04-20 PROCEDURE — 7220000101 HC DELIVERY VAGINAL/SINGLE

## 2023-04-20 PROCEDURE — 3E0DXGC INTRODUCTION OF OTHER THERAPEUTIC SUBSTANCE INTO MOUTH AND PHARYNX, EXTERNAL APPROACH: ICD-10-PCS | Performed by: OBSTETRICS & GYNECOLOGY

## 2023-04-20 PROCEDURE — 59409 OBSTETRICAL CARE: CPT | Performed by: OBSTETRICS & GYNECOLOGY

## 2023-04-20 PROCEDURE — 3700000025 EPIDURAL BLOCK: Performed by: ANESTHESIOLOGY

## 2023-04-20 PROCEDURE — 00HU33Z INSERTION OF INFUSION DEVICE INTO SPINAL CANAL, PERCUTANEOUS APPROACH: ICD-10-PCS | Performed by: OBSTETRICS & GYNECOLOGY

## 2023-04-20 PROCEDURE — 2580000003 HC RX 258: Performed by: OBSTETRICS & GYNECOLOGY

## 2023-04-20 PROCEDURE — 7100000010 HC PHASE II RECOVERY - FIRST 15 MIN

## 2023-04-20 RX ORDER — ACETAMINOPHEN 500 MG
1000 TABLET ORAL EVERY 8 HOURS PRN
Status: DISCONTINUED | OUTPATIENT
Start: 2023-04-20 | End: 2023-04-22 | Stop reason: HOSPADM

## 2023-04-20 RX ORDER — LANOLIN
CREAM (ML) TOPICAL PRN
Status: DISCONTINUED | OUTPATIENT
Start: 2023-04-20 | End: 2023-04-22 | Stop reason: HOSPADM

## 2023-04-20 RX ORDER — DOCUSATE SODIUM 100 MG/1
100 CAPSULE, LIQUID FILLED ORAL 2 TIMES DAILY
Status: DISCONTINUED | OUTPATIENT
Start: 2023-04-20 | End: 2023-04-22 | Stop reason: HOSPADM

## 2023-04-20 RX ORDER — IBUPROFEN 800 MG/1
800 TABLET ORAL EVERY 8 HOURS
Status: DISCONTINUED | OUTPATIENT
Start: 2023-04-20 | End: 2023-04-22 | Stop reason: HOSPADM

## 2023-04-20 RX ORDER — ROPIVACAINE HYDROCHLORIDE 2 MG/ML
INJECTION, SOLUTION EPIDURAL; INFILTRATION; PERINEURAL PRN
Status: DISCONTINUED | OUTPATIENT
Start: 2023-04-20 | End: 2023-04-20 | Stop reason: SDUPTHER

## 2023-04-20 RX ORDER — OXYCODONE HYDROCHLORIDE 5 MG/1
5 TABLET ORAL EVERY 4 HOURS PRN
Status: DISCONTINUED | OUTPATIENT
Start: 2023-04-20 | End: 2023-04-22 | Stop reason: HOSPADM

## 2023-04-20 RX ORDER — MISOPROSTOL 200 UG/1
200 TABLET ORAL PRN
Status: DISCONTINUED | OUTPATIENT
Start: 2023-04-20 | End: 2023-04-21

## 2023-04-20 RX ORDER — METHYLERGONOVINE MALEATE 0.2 MG/ML
200 INJECTION INTRAVENOUS PRN
Status: DISCONTINUED | OUTPATIENT
Start: 2023-04-20 | End: 2023-04-22 | Stop reason: HOSPADM

## 2023-04-20 RX ORDER — CALCIUM CARBONATE 200(500)MG
1000 TABLET,CHEWABLE ORAL 3 TIMES DAILY PRN
Status: DISCONTINUED | OUTPATIENT
Start: 2023-04-20 | End: 2023-04-22 | Stop reason: HOSPADM

## 2023-04-20 RX ORDER — SODIUM CHLORIDE 0.9 % (FLUSH) 0.9 %
5-40 SYRINGE (ML) INJECTION PRN
Status: DISCONTINUED | OUTPATIENT
Start: 2023-04-20 | End: 2023-04-21

## 2023-04-20 RX ORDER — SODIUM CHLORIDE 0.9 % (FLUSH) 0.9 %
5-40 SYRINGE (ML) INJECTION EVERY 12 HOURS SCHEDULED
Status: DISCONTINUED | OUTPATIENT
Start: 2023-04-20 | End: 2023-04-21

## 2023-04-20 RX ORDER — SODIUM CHLORIDE, SODIUM LACTATE, POTASSIUM CHLORIDE, CALCIUM CHLORIDE 600; 310; 30; 20 MG/100ML; MG/100ML; MG/100ML; MG/100ML
INJECTION, SOLUTION INTRAVENOUS CONTINUOUS
Status: DISCONTINUED | OUTPATIENT
Start: 2023-04-20 | End: 2023-04-21

## 2023-04-20 RX ORDER — NALOXONE HYDROCHLORIDE 0.4 MG/ML
INJECTION, SOLUTION INTRAMUSCULAR; INTRAVENOUS; SUBCUTANEOUS PRN
Status: DISCONTINUED | OUTPATIENT
Start: 2023-04-20 | End: 2023-04-21

## 2023-04-20 RX ORDER — SODIUM CHLORIDE 9 MG/ML
INJECTION, SOLUTION INTRAVENOUS PRN
Status: DISCONTINUED | OUTPATIENT
Start: 2023-04-20 | End: 2023-04-21

## 2023-04-20 RX ORDER — ONDANSETRON 2 MG/ML
4 INJECTION INTRAMUSCULAR; INTRAVENOUS EVERY 6 HOURS PRN
Status: DISCONTINUED | OUTPATIENT
Start: 2023-04-20 | End: 2023-04-21

## 2023-04-20 RX ORDER — MISOPROSTOL 100 UG/1
50 TABLET ORAL EVERY 4 HOURS
Status: DISCONTINUED | OUTPATIENT
Start: 2023-04-20 | End: 2023-04-21

## 2023-04-20 RX ADMIN — ROPIVACAINE HYDROCHLORIDE 8 ML: 2 INJECTION, SOLUTION EPIDURAL; INFILTRATION; PERINEURAL at 10:07

## 2023-04-20 RX ADMIN — ONDANSETRON 4 MG: 2 INJECTION INTRAMUSCULAR; INTRAVENOUS at 10:27

## 2023-04-20 RX ADMIN — ROPIVACAINE HYDROCHLORIDE 10 ML/HR: 2 INJECTION, SOLUTION EPIDURAL; INFILTRATION; PERINEURAL at 10:08

## 2023-04-20 RX ADMIN — IBUPROFEN 800 MG: 800 TABLET, FILM COATED ORAL at 17:19

## 2023-04-20 RX ADMIN — Medication 50 MCG: at 07:43

## 2023-04-20 RX ADMIN — Medication: at 17:24

## 2023-04-20 RX ADMIN — Medication 2 MILLI-UNITS/MIN: at 11:31

## 2023-04-20 RX ADMIN — Medication 50 MCG: at 03:28

## 2023-04-20 RX ADMIN — MISOPROSTOL 600 MCG: 200 TABLET ORAL at 13:24

## 2023-04-20 RX ADMIN — ACETAMINOPHEN 1000 MG: 500 TABLET, FILM COATED ORAL at 14:34

## 2023-04-20 RX ADMIN — DOCUSATE SODIUM 100 MG: 100 CAPSULE ORAL at 22:19

## 2023-04-20 RX ADMIN — ANTACID TABLETS 1000 MG: 500 TABLET, CHEWABLE ORAL at 07:42

## 2023-04-20 RX ADMIN — SODIUM CHLORIDE, SODIUM LACTATE, POTASSIUM CHLORIDE, CALCIUM CHLORIDE AND DEXTROSE MONOHYDRATE: 5; 600; 310; 30; 20 INJECTION, SOLUTION INTRAVENOUS at 09:30

## 2023-04-20 RX ADMIN — WITCH HAZEL: 500 SOLUTION RECTAL; TOPICAL at 17:23

## 2023-04-20 RX ADMIN — Medication 87.3 MILLI-UNITS/MIN: at 15:57

## 2023-04-20 RX ADMIN — Medication 166.7 ML: at 13:23

## 2023-04-20 RX ADMIN — METHYLERGONOVINE MALEATE 200 MCG: 0.2 INJECTION, SOLUTION INTRAMUSCULAR; INTRAVENOUS at 14:12

## 2023-04-20 ASSESSMENT — PAIN SCALES - GENERAL: PAINLEVEL_OUTOF10: 4

## 2023-04-20 ASSESSMENT — PAIN DESCRIPTION - LOCATION: LOCATION: HEAD

## 2023-04-20 NOTE — PROGRESS NOTES
[facility]  [address] [facility], [address], [address]  [address] [facility], [place], Selena Ugalde, [facility]    Labor Progress Note    Pt tolerating induction/labor well.     Vitals:    04/20/23 1004 04/20/23 1006 04/20/23 1007 04/20/23 1009   BP: 116/71 109/71 111/71 113/66   Pulse: 82 89 77 71   Resp:       Temp:       TempSrc:       SpO2:       Weight:       Height:           FHT's:  Baseline -140's, reactive  [place]:  irreg ctx    SVE:  5/70/-2  Membranes:  AROM - copious, clear    Assessement and Plan: Sandrine Lopez 25 y.o. [id number] at 39w1d admitted for IOL for poly    Will begin pitocin augmentation     Pain control: epidural    GBS: neg      Shell Austin MD  10:41 AM  04/20/23

## 2023-04-20 NOTE — PROGRESS NOTES
Patient assisted to bathroom. Voided without difficulty. Pao care taught.   Patient ambulated back to bed with assistance

## 2023-04-20 NOTE — ANESTHESIA POSTPROCEDURE EVALUATION
Anesthesiology Epidural Followup Note    Korina Chadwick  25 y.o.  female      Visit Vitals  /68   Pulse 69   Temp 98.4 °F (36.9 °C) (Oral)   Resp 18   Ht 5' 6\" (1.676 m)   Wt 184 lb (83.5 kg)   SpO2 98%   Breastfeeding Unknown   BMI 29.70 kg/m²       Pt is s/p vaginal delivery with continuous labor epidural. Patient had adequate pain control during labor and delivery, and she is currently without complaints. Motor and sensory function has returned to baseline in lower extremities. Back exam is clear with no signs of infection or erythema. No apparent anesthetic complications. Patient is satisfied with anesthetic care. Pain control and follow up per obstetrician.       Mary Butler MD  Anesthesiologist  April 20, 2023

## 2023-04-20 NOTE — PROGRESS NOTES
I have reviewed the patient's visit today including history, exam and assessment by MARC Pimentel. I agree with treatment/plan as above.     Shoshana Diaz MD  8:26 AM  04/20/23
Patient comes in today for routine prenatal visit. No complaints/concerns today.      Fetal Movement: Yes  Contractions: No  Vaginal Bleeding: No  Leaking Fluid: No  GI/: No    Vitals:    04/19/23 1439   BP: 128/68   Site: Left Upper Arm   Position: Sitting   Weight: 184 lb (83.5 kg)   Height: 5' 6\" (1.676 m)
Patient comes in today for routine prenatal visit. No complaints/concerns today. Fetal Movement: {YES/NO:19726}  Contractions: {YES/NO:19726}  Vaginal Bleeding: {YES/NO:19726}  Leaking Fluid: {YES/NO:19726}  GI/: {YES/NO:19726}    There were no vitals filed for this visit.
 Retained intrauterine device (IUD) during pregnancy in second trimester 08/24/2022     Priority: Medium     Overview Note:     8/24/2022: US today reveals 5w5d IUP, +GS, +YS, + FP, no fetal cardiac activity  Mirena IUD noted in posterior lateral part of uterus, ?perforation of arm of IUD    9/1/2022: viability confirmed, given IUD strings lost and ?perforation plan IUD to remain in situ. D/W Dr oMise Winchester deficiency 03/24/2023    History of delivery of macrosomal infant 09/29/2022     Overview Note:     G1 -- 10+ lbs, G2 - 8+ lbs    PLAN:  Serial growth in 3rd trimester    12/6/22: EFW >99%, AC >99%, HODA n, Cx nl, BREECH  1/3/23:  EFW 98%, AC 98%, HODA 15.4 cm, vtx, no evidence of VSD  2/17/2023: EFW >99%, AC >99%, HODA nl, vertex EFW 5lb4oz, again discuss limiting carbs  3/20/2023: EFW >99%, AC 96.2%, HODA nl, vertex. S/D ratio risk 2.1  4/19/2023: EFW >99%, AC >99%%, HODA increased 25 cm, vertex         Problem List Items Addressed This Visit        Circulatory    Fetal ventricular septal defect affecting antepartum care of mother, fetus 1       Other    Anemia affecting pregnancy in third trimester    Chlamydia infection affecting pregnancy in third trimester     noted         Multigravida in third trimester     PTL/labor precautions, [facility], and pregnancy warning signs reviewed. Pt advised to call the office at [phone] or go straight to Labor and Delivery at [facility] with any of the following concerns vaginal bleeding, leaking of fluid, zeeshan regularly Q 5-7 minutes for over an hour or not feeling the baby move.    RTO 6 wk PPV         Relevant Orders    AMB POC US OB RE-EVAL/FOLLOW UP (Completed)    AMB POC US FETAL BIOPHYSICAL PROFILE W/O NON STRESS TESTING (Completed)    Retained intrauterine device (IUD) during pregnancy in second trimester    History of delivery of macrosomal infant    Relevant Orders    AMB POC US OB RE-EVAL/FOLLOW UP (Completed)    AMB POC US FETAL

## 2023-04-20 NOTE — PROGRESS NOTES
Zo Bey Adjutant at bedside at 4216. LISET Brooks at bedside at [address] pt to sitting up on bedside at 0953. Timeout completed at 1466 with LISET PEREA and myself at bedside. Test dose given at 1002. Negative reaction. Dose given at 1006. Pt assisted to lying back in left tilt position. See anesthesia record for details. See vital sign flow sheet for BP. Tolerated procedure well.

## 2023-04-20 NOTE — H&P
dysuria or hematuria      Prenatal Record Review    The prenatal record and all pertinent labs has been reviewed.     Objective    Vitals:    04/19/23 1832 04/20/23 0238 04/20/23 0657   BP: 117/73 118/70 115/69   Pulse:  75 68   Resp: 16 18 18   Temp: 97.4 °F (36.3 °C) 98.4 °F (36.9 °C) 98.3 °F (36.8 °C)   TempSrc: Oral Oral Oral   SpO2: 99%     Weight: 184 lb (83.5 kg)     Height: 5' 6\" (1.676 m)            Obstetric Exam    SVE: FT/30/-3      Physical Exam    Gen: alert and cooperative, NAD    HEENT: NCAT    CV: RRR    RESP: CTA bilat    ABD: Gravid, soft, NT    EXT: trace edema bilat    NEURO: No focal deficits    SKIN: No noted rashes or lesions       Marleen Nageotte, MD  8:03 AM  04/20/23

## 2023-04-20 NOTE — L&D DELIVERY NOTE
[address], [place] [address], [address] [facility]  Logan Montgomery MD, PANTERA Sarmiento-BC  Magdalene Mcmillan MD, FACOG  Delivery Note    Present for entire delivery. Details in delivery summary. . Viable female infant, APGARS 8,9 .   Weight 11 lbs. , 7 oz. EBL:  200 mL  Pain mgmt:   epidural    Nuchal cord x 1, delivered through then reduced without difficulty    Placenta spont, intact, 3VC. No lacerations    Pt and infant stable.       Carlos Ramos MD   1:34 PM  23
Final Counts Correct Correct Correct   Final Count Personnel: [facility]  Final Count Verified By: Issa GARCIA,FAY  Accurate Final Count?: Yes  If the count is incorrect due to Intentionally Retained Foreign Object (IRFO) add the IRFO LDA in Lines/Drains. Add LDA: Link to [facility]       Blood Loss  Mother: Tucker Castleman #[id number]     Start of Mother's Information      Delivery Blood Loss  23 0930 - 23 1333      None                 End of Mother's Information  Mother: Tucker Castleman #[id number]                Delivery Providers    Delivering clinician: Sarah Martin MD     Provider Role    Kali Carr MD Obstetrician    Conway Bamberger, RN Primary Nurse    Serjio Maciel, RN Primary [place] Nurse    Yue Bran Scrub Tech    Daniel Christie RN Charge Nurse               Assessment    Living Status: Living     Apgar Scoring Key:    0 1 2    Skin Color: Blue or pale Acrocyanotic Completely pink    Heart Rate: Absent <100 bpm >100 bpm    Reflex Irritability: No response Grimace Cry or active withdrawal    Muscle Tone: Limp Some flexion Active motion    Respiratory Effort: Absent Weak cry; hypoventilation Good, crying                      Skin Color:   Heart Rate:   Reflex Irritability:   Muscle Tone:   Respiratory Effort:    Total:            1 Minute:    0    2    2    2    2    8        Apgar 1 total from OB History    5 Minute:    1    2    2    2    2    9        Apgar 5 total from OB History    10 Minute:              15 Minute:              20 Minute:                        Apgars Assigned By: Brian Owen              Resuscitation    Method: Bulb Suction, Stimulation              Measurements               Title      Skin to Skin Initiation Date/Time:       Skin to Skin End Date/Time:

## 2023-04-20 NOTE — ANESTHESIA PROCEDURE NOTES
Epidural Block    Patient location during procedure: OB  Start time: 4/20/2023 9:52 AM  End time: 4/20/2023 10:01 AM  Reason for block: labor epidural  Staffing  Performed: anesthesiologist   Anesthesiologist: Julian Stephens MD  Epidural  Patient position: sitting  Prep: ChloraPrep  Patient monitoring: continuous pulse ox and frequent blood pressure checks  Approach: midline  Location: L3-4  Injection technique: ALEX saline  Provider prep: mask and sterile gloves  Needle  Needle type: Tuohy   Needle gauge: 17 G  Needle length: 3.5 in  Needle insertion depth: 5 cm  Catheter type: end hole  Catheter size: 19 G  Catheter at skin depth: 10 cm  Test dose: negativeCatheter Secured: tegaderm and tape (Sterile Mastisol applied at skin)  Assessment  Hemodynamics: stable  Attempts: 1  Outcomes: patient tolerated procedure well  Additional Notes  3 cc 1% lidocaine local at needle insertion site. Risks discussed including damage to muscle or nerve.   Preanesthetic Checklist  Completed: patient identified, IV checked, risks and benefits discussed, surgical/procedural consents, equipment checked, pre-op evaluation, timeout performed, anesthesia consent given, oxygen available and monitors applied/VS acknowledged

## 2023-04-20 NOTE — ANESTHESIA PRE PROCEDURE
Department of Anesthesiology  Preprocedure Note       Name:  Zofia Montoya   Age:  25 y.o.  :  2000                                          MRN:  [id number]         Date:  2023      Surgeon: * No surgeons listed *    Procedure: * No procedures listed *    Medications prior to admission:   Prior to Admission medications    Medication Sig Start Date End Date Taking?  Authorizing Provider   calcium carbonate (TUMS) 500 MG chewable tablet Take 1 tablet by mouth daily   Yes Historical Provider, MD   ondansetron (ZOFRAN-ODT) 4 MG disintegrating tablet Take 1 tablet by mouth 3 times daily as needed for Nausea or Vomiting 22   Suzi Ortega MD   Prenatal Vit-Fe Fumarate-FA (PRENATAL VITAMIN) 27-0.8 MG TABS Take 1 tablet by mouth daily 22   ISAÍAS Gilbert - CNP       Current medications:    Current Facility-Administered Medications   Medication Dose Route Frequency Provider Last Rate Last Admin    miSOPROStol (CYTOTEC) pre-split tablet TABS 50 mcg  50 mcg Buccal Q4H Eufemia Arreola MD   50 mcg at 23 0743    calcium carbonate (TUMS) chewable tablet 1,000 mg  1,000 mg Oral TID PRN Mare Banks MD   1,000 mg at 23 0742    dextrose 5 % in lactated ringers infusion   IntraVENous Continuous Kali Ho MD        lactated ringers bolus  500 mL IntraVENous PRN Mare Banks MD        Or    lactated ringers bolus  1,000 mL IntraVENous PRN Mare Banks MD        0.9 % sodium chloride infusion  25 mL IntraVENous PRN Mare Banks MD        methylergonovine (METHERGINE) injection 200 mcg  200 mcg IntraMUSCular Once PRN Mare Banks MD        miSOPROStol (CYTOTEC) tablet 800 mcg  800 mcg Rectal Once PRN Mare Banks MD        tranexamic acid-NaCl IVPB premix 1,000 mg  1,000 mg IntraVENous Once PRN Mare Banks MD        oxytocin (PITOCIN) 30 units in 500 mL infusion  87.3 siria-units/min IntraVENous

## 2023-04-20 NOTE — PROGRESS NOTES
SBAR report received from SHON Jarrett,RN. Care assumed. Pt complains of heartburn pain and requested Tums. Order place. Pt states she feels slight back pain with UC. TOCO readjusted. [address] [facility] Tessa Carcamo notified of Solvellir 96 1/50/-3. Orders for 2nd dose of Cytotec 48.     0930- Dr Tessa Carcamo at bedside. Strip reviewed. SVE 4/  Orders for epidural. IV bolus infusing.

## 2023-04-20 NOTE — PROGRESS NOTES
1025-Dr Fang at bedside. Strip reviewed. SVE 80/-2. AROM. Large amt clear fluid noted. I&O cath for 200 cc urine. Pt repositioned to left side on peanut ball. 1130- Pt repositioned to right side on peanut ball. Pitocin started at 2 mu per MD order. 1230- I&O cath for 100 cc urine. SVE 9/100/0. Pt repositioned to throne position. Dr Rohan Denton notified. 1256- Pt feeling increased pressure. SVE c/c/+1. Dr Rohan Denton notified. 1310- Pt starting to push with uterine contractions. 1320-  viable female. Wt 11-7, Apgars 8/9.     1323- Placenta delivered. Pitocin infusing. 1410- Mod amt vaginal bleeding small clots. Dr Rohan Denton notified. Orders for Methergine. 1412- Methergine given IM.

## 2023-04-21 PROCEDURE — 1100000000 HC RM PRIVATE

## 2023-04-21 PROCEDURE — 6370000000 HC RX 637 (ALT 250 FOR IP): Performed by: OBSTETRICS & GYNECOLOGY

## 2023-04-21 RX ADMIN — DOCUSATE SODIUM 100 MG: 100 CAPSULE ORAL at 09:02

## 2023-04-21 RX ADMIN — IBUPROFEN 800 MG: 800 TABLET, FILM COATED ORAL at 17:09

## 2023-04-21 RX ADMIN — IBUPROFEN 800 MG: 800 TABLET, FILM COATED ORAL at 01:08

## 2023-04-21 RX ADMIN — IBUPROFEN 800 MG: 800 TABLET, FILM COATED ORAL at 09:01

## 2023-04-21 RX ADMIN — DOCUSATE SODIUM 100 MG: 100 CAPSULE ORAL at 21:46

## 2023-04-21 RX ADMIN — ACETAMINOPHEN 1000 MG: 500 TABLET, FILM COATED ORAL at 21:46

## 2023-04-21 NOTE — PROGRESS NOTES
Post-Partum Day Number 1 Progress Note  Jessie Cummings  [id number]    Patient doing well post-partum without significant complaint. Voiding without difficulty, normal lochia. Vitals:  Patient Vitals for the past 24 hrs:   BP Temp Temp src Pulse Resp SpO2   23 0822 105/69 98 °F (36.7 °C) Oral 75 18 97 %   23 2352 (!) 90/57 97.5 °F (36.4 °C) Oral 64 14 99 %   23 1718 112/68 98.4 °F (36.9 °C) Oral 69 18 98 %   23 1510 106/61 100.3 °F (37.9 °C) Oral 78 18 100 %   23 1455 112/66 -- -- 74 -- --   23 1450 (!) 121/59 -- -- 73 -- --   23 1440 (!) 170/77 -- -- 85 -- --   23 1425 (!) 163/77 -- -- 82 -- --   23 1412 (!) 158/77 -- -- 84 -- 99 %   23 1355 (!) 174/136 -- -- (!) 210 -- --   23 1326 115/65 -- -- 92 -- --   23 1257 119/65 -- -- 81 -- --   23 1240 115/72 -- -- 88 -- --   23 1226 110/63 -- -- 80 -- --   23 1211 117/66 -- -- 77 -- --   23 1156 109/60 -- -- 68 -- --   23 1141 (!) 111/58 -- -- 73 -- --     Temp (24hrs), Av.6 °F (37 °C), Min:97.5 °F (36.4 °C), Max:100.3 °F (37.9 °C)      Vital signs stable, afebrile. Exam:  Patient without distress. Abdomen soft, fundus firm at level of umbilicus, nontender               Labs: No results found for this or any previous visit (from the past 24 hour(s)). Assessment and Plan:  Patient appears to be having uncomplicated post-partum course. Continue routine perineal care and maternal education.   Plan discharge tomorrow if no problems occur.  +/+  Uses Theodore ROMEO

## 2023-04-21 NOTE — LACTATION NOTE
This note was copied from a baby's chart. In to see mom and infant for the first time. Mom appeared pretty emotional and started crying. She stated that she was upset as the night shift nurse had been \"trying to get her to feed her infant formula\". Infant had been having borderline blood sugars. Experienced mom stated that infant has been latching and nursing well since birth. Offered to set up the pump for her but she declined stating that she knows she will get \"little or no colostrum\" in the beginning. Asked her if she could hand express and she stated that she can. She also stated that she had wanted an early discharge but has changed her mind. Informed mom that lactation consultant is available as needed and that I would inform Nurse Practitioner as well as bedside nurse of mom's concerns. Lactation consultant will follow up tomorrow.

## 2023-04-22 VITALS
SYSTOLIC BLOOD PRESSURE: 110 MMHG | RESPIRATION RATE: 16 BRPM | BODY MASS INDEX: 29.57 KG/M2 | HEART RATE: 64 BPM | HEIGHT: 66 IN | WEIGHT: 184 LBS | DIASTOLIC BLOOD PRESSURE: 86 MMHG | TEMPERATURE: 97.8 F | OXYGEN SATURATION: 97 %

## 2023-04-22 PROCEDURE — 6370000000 HC RX 637 (ALT 250 FOR IP): Performed by: OBSTETRICS & GYNECOLOGY

## 2023-04-22 RX ORDER — IBUPROFEN 800 MG/1
800 TABLET ORAL EVERY 8 HOURS
Qty: 90 TABLET | Refills: 0 | Status: SHIPPED | OUTPATIENT
Start: 2023-04-22

## 2023-04-22 RX ADMIN — IBUPROFEN 800 MG: 800 TABLET, FILM COATED ORAL at 00:36

## 2023-04-22 RX ADMIN — IBUPROFEN 800 MG: 800 TABLET, FILM COATED ORAL at 16:06

## 2023-04-22 RX ADMIN — IBUPROFEN 800 MG: 800 TABLET, FILM COATED ORAL at 09:43

## 2023-04-22 RX ADMIN — DOCUSATE SODIUM 100 MG: 100 CAPSULE ORAL at 09:43

## 2023-04-22 NOTE — CARE COORDINATION
RNCM provided patient with postpartum support information packet. Patient denies needs, has family support. Encouraged to call Elidia Eddy for additional supportive needs.

## 2023-04-22 NOTE — LACTATION NOTE
This note was copied from a baby's chart. In to see mom for possible discharge w/ baby today after bilirubin retested this afternoon. Mom has started supplementing w/ formula past night as baby had jaundice. Baby's bilirubin level is coming down and Md provider per RN for mom to supplement baby w/ extra milk after at least every other feed. Encouraged mom to keep offering both breasts first. Then offer supplementation of EBM and/or formula. Baby had just finished large bottle of formula right before lactation visit. Gave printed feeding plan for guidance at home. She verbalized understanding how to use. Mom states she would like to be fitted for pump flange size and to start pumping after feeds. Measured mom and reviewed how to use hospital grade pump. Mom had no further needs at this time. Encouraged mom to call out for further lactation assistance as needed.

## 2023-04-22 NOTE — DISCHARGE SUMMARY
Discharge Summary     Date of Admission:  2023  6:05 PM  Date of Discharge:  2023       Sadaf Almeida 25 y.o. [id number] presented at 39w1d for induction/in active labor. Pt had  without incident. See delivery note for all delivery details. Pt's PP course was uneventful. On day of D/C, she was ambulating well, afebrile, with lochia < menses. She was discharged home with medications as below. Pt was breast feeding on discharge. She plans on unsure for birth control. HTN diagnosis: no   Routine PP instructions given to patient. She is to follow up with [facility] in 6 weeks for PP exam.    No problem-specific Assessment & Plan notes found for this encounter. Medication List        START taking these medications      ibuprofen 800 MG tablet  Commonly known as: ADVIL;MOTRIN  Take 1 tablet by mouth in the morning and 1 tablet at noon and 1 tablet in the evening.             CONTINUE taking these medications      calcium carbonate 500 MG chewable tablet  Commonly known as: TUMS     Prenatal Vitamin 27-0.8 MG Tabs  Take 1 tablet by mouth daily            STOP taking these medications      ondansetron 4 MG disintegrating tablet  Commonly known as: ZOFRAN-ODT               Where to Get Your Medications        These medications were sent to [facility]/pharmacy #[id number], [address] [address]      Phone: [phone]   ibuprofen 800 MG tablet         Nelle Bloch, MD  11:56 AM  23

## 2023-04-22 NOTE — PROGRESS NOTES
Post-Partum Day Number 2 Progress/Discharge Note  Rhiannon Dias  [id number]    Patient doing well post-partum without significant complaint. Voiding without difficulty, normal lochia, positive flatus. Vitals:  Patient Vitals for the past 24 hrs:   BP Temp Temp src Pulse Resp SpO2   23 0815 111/68 97.9 °F (36.6 °C) -- 71 20 97 %   23 0038 110/75 97.7 °F (36.5 °C) Oral 67 14 96 %   23 1552 101/69 98 °F (36.7 °C) Oral 78 16 98 %     Temp (24hrs), Av.9 °F (36.6 °C), Min:97.7 °F (36.5 °C), Max:98 °F (36.7 °C)      Vital signs stable, afebrile. Exam:  Patient without distress. Abdomen soft, fundus firm at level of umbilicus, nontender              Labs: No results found for this or any previous visit (from the past 24 hour(s)). Assessment and Plan:  Patient appears to be having uncomplicated post-partum course. Continue routine perineal care and maternal education. Plan discharge for today with follow up in our office in 6 weeks.

## 2023-04-22 NOTE — DISCHARGE INSTRUCTIONS
your usual activities. A fast or irregular heartbeat. New or worse belly pain. You have signs of infection, such as:  A fever. Vaginal discharge that smells bad. New or worse belly pain. You have symptoms of a blood clot in your leg (called a deep vein thrombosis), such as:  Pain in the calf, back of the knee, thigh, or groin. Redness and swelling in your leg or groin. You have signs of preeclampsia, such as:  Sudden swelling of your face, hands, or feet. New vision problems (such as dimness, blurring, or seeing spots). A severe headache. Watch closely for changes in your health, and be sure to contact your doctor if:    Your vaginal bleeding isn't decreasing. You feel sad, anxious, or hopeless for more than a few days. You are having problems with your breasts or breastfeeding. Where can you learn more? Go to http://www.woods.com/ and enter Q237 to learn more about \"Vaginal Childbirth: Care Instructions. \"  Current as of: November 9, 2022               Content Version: 13.6  © 2672-7889 Healthwise, Incorporated. Care instructions adapted under license by [facility] ([facility]). If you have questions about a medical condition or this instruction, always ask your healthcare professional. [address] any warranty or liability for your use of this information.

## 2023-04-22 NOTE — LACTATION NOTE
This note was copied from a baby's chart. Individualized Feeding Plan for Breastfeeding   Lactation Services [phone]    As much as possible, hold your baby on your chest so babys bare skin is against your bare skin with a blanket covering babys back, especially 30 minutes before it is time for baby to eat. Watch for early feeding cues such as, licking lips, sucking motions, rooting, hands to mouth. Crying is a late feeding cue. Feed your baby at least 8 times in 24 hours, or more if your baby is showing feeding cues. If baby is sleepy put baby skin to skin and watch for hunger cues. To rouse baby: unwrap, undress, massage hands, feet, & back, change diaper, gently change babys position from lying to sitting. 15-20 minutes on the first breast of active breastfeeding is considered a good feeding. Good, active breastfeeding is when baby is alert, tugging the nipple, their ear may move, and you can hear swallows. Allow baby to finish the first side before changing sides. Sleeping at the breast or only brief, light sucks should not be considered a good, full breastfeed. At each feeding:  __x__1. Do Suck Practice on finger before each feeding until sucking pattern is smooth. Try using index finger. Nail down towards tongue. __x__2. Hand Express for a few minutes prior to latching to help start milk flow. __x__3. Baby needs to NURSE WELL x 15-20 minutes on at least first breast, burp and offer 2nd breast at every feeding. If no sustained latch only attempt at breast for 10 minutes. If baby does not latch on and feed well on at least one side, you should:   __x__4. Double pump for 15 minutes with breast massage and compression. Hand express for an additional 2-3 minutes per side. Pump after each feeding attempt or poor feeding, up to 8 times per day. If you are not putting baby to the breast you need to pump 8 times a day. Pump every 3 hours. __x__5.  Give baby all of

## 2023-04-26 DIAGNOSIS — K59.00 CONSTIPATION, UNSPECIFIED CONSTIPATION TYPE: Primary | ICD-10-CM

## 2023-04-26 DIAGNOSIS — O99.013 ANEMIA AFFECTING PREGNANCY IN THIRD TRIMESTER: ICD-10-CM

## 2023-04-26 RX ORDER — DOCUSATE SODIUM 100 MG/1
CAPSULE, LIQUID FILLED ORAL
Qty: 60 CAPSULE | Refills: 0 | Status: SHIPPED | OUTPATIENT
Start: 2023-04-26

## 2023-05-31 ENCOUNTER — POSTPARTUM VISIT (OUTPATIENT)
Dept: OBGYN CLINIC | Age: 23
End: 2023-05-31
Payer: COMMERCIAL

## 2023-05-31 VITALS
BODY MASS INDEX: 26.84 KG/M2 | HEIGHT: 66 IN | WEIGHT: 167 LBS | DIASTOLIC BLOOD PRESSURE: 74 MMHG | SYSTOLIC BLOOD PRESSURE: 110 MMHG

## 2023-05-31 DIAGNOSIS — N39.46 MIXED INCONTINENCE: ICD-10-CM

## 2023-05-31 PROBLEM — Z87.59 HISTORY OF DELIVERY OF MACROSOMAL INFANT: Status: RESOLVED | Noted: 2022-09-29 | Resolved: 2023-05-31

## 2023-05-31 PROBLEM — Z34.83 MULTIGRAVIDA IN THIRD TRIMESTER: Status: RESOLVED | Noted: 2022-09-01 | Resolved: 2023-05-31

## 2023-05-31 PROBLEM — O40.3XX0 POLYHYDRAMNIOS AFFECTING PREGNANCY IN THIRD TRIMESTER: Status: RESOLVED | Noted: 2023-04-19 | Resolved: 2023-05-31

## 2023-05-31 PROBLEM — O26.32: Status: RESOLVED | Noted: 2022-08-24 | Resolved: 2023-05-31

## 2023-05-31 PROBLEM — O98.813 CHLAMYDIA INFECTION AFFECTING PREGNANCY IN THIRD TRIMESTER: Status: RESOLVED | Noted: 2023-02-06 | Resolved: 2023-05-31

## 2023-05-31 PROBLEM — Z3A.39 39 WEEKS GESTATION OF PREGNANCY: Status: RESOLVED | Noted: 2023-04-19 | Resolved: 2023-05-31

## 2023-05-31 PROBLEM — A74.9 CHLAMYDIA INFECTION AFFECTING PREGNANCY IN THIRD TRIMESTER: Status: RESOLVED | Noted: 2023-02-06 | Resolved: 2023-05-31

## 2023-05-31 PROBLEM — O35.BXX1 FETAL VENTRICULAR SEPTAL DEFECT AFFECTING ANTEPARTUM CARE OF MOTHER, FETUS 1: Status: RESOLVED | Noted: 2022-12-06 | Resolved: 2023-05-31

## 2023-05-31 RX ORDER — NORETHINDRONE ACETATE AND ETHINYL ESTRADIOL 1MG-20(21)
1 KIT ORAL DAILY
Qty: 1 PACKET | Refills: 12 | Status: SHIPPED | OUTPATIENT
Start: 2023-05-31

## 2023-05-31 ASSESSMENT — PATIENT HEALTH QUESTIONNAIRE - PHQ9
SUM OF ALL RESPONSES TO PHQ QUESTIONS 1-9: 0
SUM OF ALL RESPONSES TO PHQ9 QUESTIONS 1 & 2: 0
SUM OF ALL RESPONSES TO PHQ QUESTIONS 1-9: 0
2. FEELING DOWN, DEPRESSED OR HOPELESS: 0
SUM OF ALL RESPONSES TO PHQ QUESTIONS 1-9: 0
SUM OF ALL RESPONSES TO PHQ QUESTIONS 1-9: 0
1. LITTLE INTEREST OR PLEASURE IN DOING THINGS: 0

## 2023-05-31 NOTE — PATIENT INSTRUCTIONS
Please do the Kegel exercises as we discussed. Make sure you try to go to the bathroom every 2-3 hours throughout the day, don't have anything to drink after dinner and go to the bathroom right before bed to help retrain your bladder to work better. You can start your birth control pills on the first Sunday after you next period starts. You should try to take them around the same time each day. Remember the pills may cause irregular bleeding for the first couple of months when starting pills. Follow up as needed or next year for your yearly female exam.  Thanks for coming to see us today and letting us take care of you!

## 2023-05-31 NOTE — PROGRESS NOTES
[address], [place] [address], [address] [facility]  [phone]    Figueroa Perez MD, Akilah Son, [facility]  Tamir Grayson MD, FACOG    6 week Postpartum Office Visit    Heather Espinosa is a 21 y.o. [id number] that presents for [facility] visit today    Delivery Method: Vaginal     Delivery Date: 2023     Birth Control: none, desires OCPs     Breast/Bottle:  Bottle     Bleeding: None     Baby: Doing well     Bowel/Bladder: can't hold her urine     Blues: None     Last pap smear:  2021, Negative     OB History    Para Term  AB Living   4 3 3   1 3   SAB IAB Ectopic Molar Multiple Live Births   1       0 3      # Outcome Date GA Lbr Олег/2nd Weight Sex Delivery Anes PTL Lv   4 Term 23 39w1d 03:26 / 00:24 11 lb 7.3 oz (5.195 kg) F Vag-Spont EPI N ANG   3 SAB 2021           2 Term 19 40w3d  8 lb 11.9 oz (3.965 kg) F Vag-Spont EPI N ANG   1 Term 10/14/17 41w0d / 01:00 10 lb 13.9 oz (4.93 kg) M Vag-Spont  N ANG      Complications: Post-term pregnancy, 40-42 weeks of gestation        Past Medical History:   Diagnosis Date    ADHD (attention deficit hyperactivity disorder) 2015    Anemia during pregnancy in third trimester 2019    Condyloma acuminata 2017    noted     Depressive disorder 2015    Fetal ventricular septal defect affecting antepartum care of mother, fetus 1 2022    H/O seasonal allergies     Syncope and collapse 2015       Past Surgical History:   Procedure Laterality Date    APPENDECTOMY      HEMORRHOID SURGERY  2023        Social History     Socioeconomic History    Marital status:      Spouse name: Not on file    Number of children: Not on file    Years of education: Not on file    Highest education level: Not on file   Occupational History    Not on file   Tobacco Use    Smoking status: Never    Smokeless tobacco: Never   Substance and Sexual Activity    Alcohol use: No    Drug use: No    Sexual activity:

## 2023-05-31 NOTE — ASSESSMENT & PLAN NOTE
D/W pt nml variant imm PP (especially in light of baby's size)  Will trial bladder retraining and Kegel's and follow effects

## 2023-05-31 NOTE — PROGRESS NOTES
Patient comes in today for 6 week post partum visit. Patient would like to discuss weight issues. Patient states she has gained 10 pounds in a week. Delivery Method: Vaginal    Delivery Date: 04/20/2023    Birth Control: none, Cass (pill)    Breast/Bottle:  Bottle    Bleeding: None    Baby: Doing well    Bowel/Bladder: can't hold her urine    Blues: None    Last pap smear:  12/13/2021, Negative     Vitals:    05/31/23 1044   BP: 110/74   Site: Left Upper Arm   Position: Sitting   Weight: 167 lb (75.8 kg)   Height: 5' 6\" (1.676 m)          Nuvia Lee MA  10:54 AM  05/31/23

## 2023-08-14 PROBLEM — Z76.89 ENCOUNTER TO ESTABLISH CARE WITH NEW DOCTOR: Status: ACTIVE | Noted: 2023-08-14

## 2023-08-14 ASSESSMENT — ENCOUNTER SYMPTOMS
DIARRHEA: 0
COUGH: 0
SHORTNESS OF BREATH: 0

## 2023-08-14 NOTE — PROGRESS NOTES
Kirk DO Rossy Farley, [address] [facility], [address] [facility]  [phone]          ASSESSMENT AND PLAN    Problem List Items Addressed This Visit          Other    Encounter to establish care with new doctor - Primary    Constipation     Discussed pushing fluids, use of OTC stool softeners, and walking to help with constipation. Discussed asking Ob/Gyn about using a suppository to help with rectal pressure. Less than 8 weeks gestation of pregnancy      New OB appointment right after this. Will wait for her labs. The diagnoses and plan were discussed with the patient, who verbalizes understanding and agrees with plan. All questions answered. Chief Complaint    Chief Complaint   Patient presents with    Establish Care       HISTORY OF PRESENT ILLNESS    21 y.o. female Graciela Spangler presents today to establish care with a new primary care provider. Patient is six weeks pregnant, four months postpartum. Reports that she had anemia with her last pregnancy and states that her last baby was 11 pounds. Denies other pregnancy complications. States that her first pregnancy revealed a VSD but it closed two weeks after her son was born. Has her new OB appointment after this. Feels a little tired, has restarted prenatal vitamins but notes that she is not sure if they have iron in them. States that she has been constipated. Notes that she has had some nausea and has vomited some but it is not bad. States that she has not tried anything for her constipation but notes that she has rectal pressure and lower abdominal pain. Denies urinary symptoms or vaginal bleeding.     PAST MEDICAL HISTORY    Past Medical History:   Diagnosis Date    Anemia during pregnancy in third trimester 7/11/2019    Condyloma acuminata 8/1/2017    noted     Depressive disorder 11/12/2015    Fetal ventricular septal defect affecting antepartum care of mother, fetus 1

## 2023-08-15 ENCOUNTER — ROUTINE PRENATAL (OUTPATIENT)
Dept: OBGYN CLINIC | Age: 23
End: 2023-08-15
Payer: COMMERCIAL

## 2023-08-15 ENCOUNTER — OFFICE VISIT (OUTPATIENT)
Dept: PRIMARY CARE CLINIC | Facility: CLINIC | Age: 23
End: 2023-08-15
Payer: COMMERCIAL

## 2023-08-15 VITALS
BODY MASS INDEX: 27.1 KG/M2 | HEART RATE: 71 BPM | SYSTOLIC BLOOD PRESSURE: 122 MMHG | HEIGHT: 66 IN | OXYGEN SATURATION: 98 % | WEIGHT: 168.6 LBS | RESPIRATION RATE: 16 BRPM | DIASTOLIC BLOOD PRESSURE: 76 MMHG

## 2023-08-15 VITALS
DIASTOLIC BLOOD PRESSURE: 66 MMHG | HEIGHT: 66 IN | WEIGHT: 169 LBS | SYSTOLIC BLOOD PRESSURE: 132 MMHG | BODY MASS INDEX: 27.16 KG/M2

## 2023-08-15 DIAGNOSIS — O09.891 SHORT INTERVAL BETWEEN PREGNANCIES AFFECTING PREGNANCY IN FIRST TRIMESTER, ANTEPARTUM: ICD-10-CM

## 2023-08-15 DIAGNOSIS — Z3A.01 LESS THAN 8 WEEKS GESTATION OF PREGNANCY: ICD-10-CM

## 2023-08-15 DIAGNOSIS — O36.80X0 ENCOUNTER TO DETERMINE FETAL VIABILITY OF PREGNANCY, SINGLE OR UNSPECIFIED FETUS: Primary | ICD-10-CM

## 2023-08-15 DIAGNOSIS — Z82.79 FAMILY HISTORY OF CONGENITAL HEART DEFECT: ICD-10-CM

## 2023-08-15 DIAGNOSIS — K59.00 CONSTIPATION, UNSPECIFIED CONSTIPATION TYPE: ICD-10-CM

## 2023-08-15 DIAGNOSIS — Z34.81 MULTIGRAVIDA IN FIRST TRIMESTER: ICD-10-CM

## 2023-08-15 DIAGNOSIS — O09.291 HISTORY OF MACROSOMIA IN INFANT IN PRIOR PREGNANCY, CURRENTLY PREGNANT IN FIRST TRIMESTER: ICD-10-CM

## 2023-08-15 DIAGNOSIS — Z76.89 ENCOUNTER TO ESTABLISH CARE WITH NEW DOCTOR: Primary | ICD-10-CM

## 2023-08-15 LAB
AMPHET UR QL SCN: NEGATIVE
BARBITURATES UR QL SCN: NEGATIVE
BENZODIAZ UR QL: NEGATIVE
CANNABINOIDS UR QL SCN: NEGATIVE
COCAINE UR QL SCN: NEGATIVE
ERYTHROCYTE [DISTWIDTH] IN BLOOD BY AUTOMATED COUNT: 12.6 % (ref 11.9–14.6)
HCT VFR BLD AUTO: 39.8 % (ref 35.8–46.3)
HGB BLD-MCNC: 13.3 G/DL (ref 11.7–15.4)
HGB RETIC QN AUTO: 35 PG (ref 29–35)
IMM RETICS NFR: 12 % (ref 3–15.9)
MCH RBC QN AUTO: 31.8 PG (ref 26.1–32.9)
MCHC RBC AUTO-ENTMCNC: 33.4 G/DL (ref 31.4–35)
MCV RBC AUTO: 95.2 FL (ref 82–102)
METHADONE UR QL: NEGATIVE
NRBC # BLD: 0 K/UL (ref 0–0.2)
OPIATES UR QL: NEGATIVE
PCP UR QL: NEGATIVE
PLATELET # BLD AUTO: 239 K/UL (ref 150–450)
PMV BLD AUTO: 11 FL (ref 9.4–12.3)
RBC # BLD AUTO: 4.18 M/UL (ref 4.05–5.2)
RETICS # AUTO: 0.11 M/UL (ref 0.03–0.1)
RETICS/RBC NFR AUTO: 2.7 % (ref 0.3–2)
WBC # BLD AUTO: 8.8 K/UL (ref 4.3–11.1)

## 2023-08-15 PROCEDURE — 76817 TRANSVAGINAL US OBSTETRIC: CPT | Performed by: NURSE PRACTITIONER

## 2023-08-15 PROCEDURE — 99213 OFFICE O/P EST LOW 20 MIN: CPT | Performed by: NURSE PRACTITIONER

## 2023-08-15 PROCEDURE — 99203 OFFICE O/P NEW LOW 30 MIN: CPT | Performed by: FAMILY MEDICINE

## 2023-08-15 SDOH — ECONOMIC STABILITY: INCOME INSECURITY: HOW HARD IS IT FOR YOU TO PAY FOR THE VERY BASICS LIKE FOOD, HOUSING, MEDICAL CARE, AND HEATING?: NOT HARD AT ALL

## 2023-08-15 SDOH — ECONOMIC STABILITY: FOOD INSECURITY: WITHIN THE PAST 12 MONTHS, THE FOOD YOU BOUGHT JUST DIDN'T LAST AND YOU DIDN'T HAVE MONEY TO GET MORE.: NEVER TRUE

## 2023-08-15 SDOH — ECONOMIC STABILITY: FOOD INSECURITY: WITHIN THE PAST 12 MONTHS, YOU WORRIED THAT YOUR FOOD WOULD RUN OUT BEFORE YOU GOT MONEY TO BUY MORE.: NEVER TRUE

## 2023-08-15 ASSESSMENT — ANXIETY QUESTIONNAIRES
2. NOT BEING ABLE TO STOP OR CONTROL WORRYING: 0
5. BEING SO RESTLESS THAT IT IS HARD TO SIT STILL: 0
6. BECOMING EASILY ANNOYED OR IRRITABLE: 0
GAD7 TOTAL SCORE: 0
7. FEELING AFRAID AS IF SOMETHING AWFUL MIGHT HAPPEN: 0
1. FEELING NERVOUS, ANXIOUS, OR ON EDGE: 0
3. WORRYING TOO MUCH ABOUT DIFFERENT THINGS: 0
4. TROUBLE RELAXING: 0
IF YOU CHECKED OFF ANY PROBLEMS ON THIS QUESTIONNAIRE, HOW DIFFICULT HAVE THESE PROBLEMS MADE IT FOR YOU TO DO YOUR WORK, TAKE CARE OF THINGS AT HOME, OR GET ALONG WITH OTHER PEOPLE: NOT DIFFICULT AT ALL

## 2023-08-15 ASSESSMENT — PATIENT HEALTH QUESTIONNAIRE - PHQ9
SUM OF ALL RESPONSES TO PHQ9 QUESTIONS 1 & 2: 0
1. LITTLE INTEREST OR PLEASURE IN DOING THINGS: 0
1. LITTLE INTEREST OR PLEASURE IN DOING THINGS: 0
SUM OF ALL RESPONSES TO PHQ QUESTIONS 1-9: 0
SUM OF ALL RESPONSES TO PHQ9 QUESTIONS 1 & 2: 0
2. FEELING DOWN, DEPRESSED OR HOPELESS: 0
SUM OF ALL RESPONSES TO PHQ QUESTIONS 1-9: 0
2. FEELING DOWN, DEPRESSED OR HOPELESS: 0
SUM OF ALL RESPONSES TO PHQ QUESTIONS 1-9: 0

## 2023-08-15 ASSESSMENT — ENCOUNTER SYMPTOMS
NAUSEA: 1
VOMITING: 1
ABDOMINAL PAIN: 1
BLOOD IN STOOL: 0
CONSTIPATION: 1

## 2023-08-15 NOTE — PROGRESS NOTES
I have reviewed the patient's visit today including history, exam and assessment by CYNDY StockBC. I agree with treatment/plan as above.     Veronica Holland MD  3:12 PM  08/15/23

## 2023-08-15 NOTE — ASSESSMENT & PLAN NOTE
History reviewed  PNV's ordered (if not already taking)  PN labs, UDS ordered  Problem list reviewed  OB physical completed  OB prenatal packet/education reviewed: Information included discusses general pregnancy topics, fetal development, weight gain, nutrition, breastfeeding, risky behaviors, WIC, vaccinations and hospital information. RTO 4 weeks  PTL/labor precautions, [place], and pregnancy warning signs reviewed. Genetic testing - D/W pt at length genetic testing that is recommended by ACOG -- NIPT, Quad screen (for trisomies and NTD), CF, SMA. Brief discussion of these diseases - conditions that may increase risks, etiology, carrier states, fetal effects, treatment options, etc was undertaken. D/W pt that these are screening tests/carrier screening test only and are NOT mandatory. We also discuss false POS/false neg rates. It is her decision whether to have them done and how to proceed with the information afterwards.

## 2023-08-15 NOTE — ASSESSMENT & PLAN NOTE
Discussed pushing fluids, use of OTC stool softeners, and walking to help with constipation. Discussed asking Ob/Gyn about using a suppository to help with rectal pressure.

## 2023-08-15 NOTE — PATIENT INSTRUCTIONS
IT WAS GREAT TO SEE YOU TODAY! I WILL FOLLOW UP WITH OB/GYN'S NOTES AND LAB RESULTS. PLEASE TAKE ALL MEDICATION AS DISCUSSED. ASK THEM ABOUT A SUPPOSITORY OR A STOOL SOFTENER TO HELP WITH YOUR CONSTIPATION. MAKE SURE YOUR PRENATAL VITAMIN HAS IRON IN IT. TAKE ONLY TYLENOL, AVOID ADVIL/IBUPROFEN/MOTRIN/ALEVE/NAPROXEN.     I WILL SEE YOU AGAIN IN 9 MONTHS BUT PLEASE CALL WITH CONCERNS [phone]

## 2023-08-15 NOTE — PROGRESS NOTES
Patient comes in today for initial prenatal visit. No complaints/concerns today. Fetal Movements:  No  Contractions:  No  Vaginal Bleeding:  No  Leaking Fluid:  No  GI/ issues:  Yes-n/v, PN booklet medications recommended. Drug/Alcohol 4P's Plus Screening    1. Have either of your parents ever had a problem with drugs/alcohol/prescription drugs? No  2. Does your partner have a problem with drugs/alcohol/prescription drugs? No  3. In the past, have you ever had a problem with drugs/alcohol/prescription drugs? No  4. Before you were pregnant, in the past month, have you done any drugs, drank any alcohol or abused any prescription drugs? No  If \"YES\" to any of the above, please give further details:  N/a     LAST PAP:  12/31/2021, neg. LAST MAMMO:  never     LMP:  No LMP recorded (lmp unknown). Patient is pregnant.     FAMILY HISTORY OF:   Breast Cancer:  No   Ovarian Cancer:  No   Uterine Cancer:  No   Colon Cancer:  No    Vitals:    08/15/23 1312   BP: 132/66   Site: Right Upper Arm   Position: Sitting   Weight: 169 lb (76.7 kg)   Height: 5' 6\" (1.676 m)        Edna Mckay RN  08/15/23  1:21 PM

## 2023-08-16 LAB
ABO + RH BLD: NORMAL
BLOOD GROUP ANTIBODIES SERPL: NORMAL
EST. AVERAGE GLUCOSE BLD GHB EST-MCNC: 80 MG/DL
FERRITIN SERPL-MCNC: 118 NG/ML (ref 8–388)
FOLATE SERPL-MCNC: 37.6 NG/ML (ref 3.1–17.5)
HBA1C MFR BLD: 4.4 % (ref 4.8–5.6)
HBV SURFACE AG SER QL: NONREACTIVE
HCV AB SER QL: NONREACTIVE
HIV 1+2 AB+HIV1 P24 AG SERPL QL IA: NONREACTIVE
HIV 1/2 RESULT COMMENT: NORMAL
IRON SATN MFR SERPL: 39 %
IRON SERPL-MCNC: 102 UG/DL (ref 35–150)
RPR SER QL: NONREACTIVE
RUBV IGG SERPL IA-ACNC: 85.3 IU/ML
TIBC SERPL-MCNC: 262 UG/DL (ref 250–450)
VIT B12 SERPL-MCNC: 200 PG/ML (ref 193–986)

## 2023-08-17 LAB
C TRACH RRNA SPEC QL NAA+PROBE: NEGATIVE
HGB A MFR BLD: 97.6 % (ref 96.4–98.8)
HGB A2 MFR BLD COLUMN CHROM: 2.4 % (ref 1.8–3.2)
HGB F MFR BLD: 0 % (ref 0–2)
HGB FRACT BLD-IMP: NORMAL
HGB S MFR BLD: 0 %
N GONORRHOEA RRNA SPEC QL NAA+PROBE: NEGATIVE
SPECIMEN SOURCE: NORMAL
T VAGINALIS RRNA SPEC QL NAA+PROBE: NEGATIVE

## 2023-08-22 ENCOUNTER — TELEPHONE (OUTPATIENT)
Dept: OBGYN CLINIC | Age: 23
End: 2023-08-22

## 2023-08-22 NOTE — TELEPHONE ENCOUNTER
Patient LM stating she has some work concerns. Patient states her manager came up to her after she had sat down for feeling dizzy. I called the patient back to discuss concerns. Patient states she needs a note stating that she can be out of work for her pregnancy. Patient states she spoke with [employer]  who stated [employer] [facility] [employer] will cover her under pregnancy disability leave until 36 weeks then she would use pre partum leave which is 4 weeks before due date then 10 weeks post partum plus six weeks under FMLA. Patient states the reason that [employer] [facility] wants her to go out of work to protect her job. Patient states [employer] [facility] [employer] does not accept work notes and can not accommodate her. Patient states she works 10 hours per day at [employer]. Patient states she eats every 2-3 hours and drinks (5) 32 ounces cups of water per day. Dr. Kentrell Jewell,     Let me know what you would like for me to do. I did advise patient that we do not take someone out of work at [employer].

## 2023-08-24 ENCOUNTER — PATIENT MESSAGE (OUTPATIENT)
Dept: OBGYN CLINIC | Age: 23
End: 2023-08-24

## 2023-08-29 ENCOUNTER — TELEPHONE (OUTPATIENT)
Dept: OBGYN CLINIC | Age: 23
End: 2023-08-29

## 2023-08-29 ASSESSMENT — ENCOUNTER SYMPTOMS
COUGH: 0
NAUSEA: 0
VOMITING: 0
SHORTNESS OF BREATH: 0
ABDOMINAL PAIN: 0
DIARRHEA: 0

## 2023-08-29 NOTE — PROGRESS NOTES
Kirk Farley DO [place], [address], [address] [facility]  [phone]         ASSESSMENT AND PLAN    Problem List Items Addressed This Visit          Circulatory    Hypotension     Low pressures at home with some dizziness, not taking anything but prenatal vitamins. BP reading today 110/60 when repeated by Dr. Cameron Hutchins. Discussed not skipping meals, pushing electrolytes fluids like Gatorade, Powerade and Propel, and letting Ob/Gyn know if it gets worse. Musculoskeletal and Integument    Folliculitis of perineum - Primary     Recurrent, like secondary to waxing. Will treat with Keflex and Neosporin, advised not to shave/wax until it resolves. Advised caution with waxing and considering [place] Milder if she wants to remove the hair. Discussed use of warm compresses on the pustules as needed. The diagnoses and plan were discussed with the patient, who verbalizes understanding and agrees with plan. All questions answered. Chief Complaint    Chief Complaint   Patient presents with    Skin Problem         HISTORY OF PRESENT ILLNESS    21 y.o. female at 8 weeks gestation presents today for skin concern. Notes that she has been feeling dizzy for the last couple of weeks. States that she has had a little nausea but not vomiting. Has checked her blood pressure at home, notes that it has been down to 96/54. Not taking anything but prenatals. Eating and drinking normally. Denies headaches, chest pain or shortness of breath. Feels weak and heavy. Notes it is worse with standing. 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 Also notices an ingrown hair in her pelvic region that has been there for a year and a half, notes that she has a second one that drains but comes back. Denies fever.   Tried Retinol

## 2023-08-29 NOTE — TELEPHONE ENCOUNTER
From: BYRON URIBE  To: Huel Spurling  Sent: 8/24/2023 4:29 PM EDT  Subject: Franklyn Mohamud,    Can you please provide me with your job description and symptoms you are having that is preventing you from working?

## 2023-08-30 ENCOUNTER — OFFICE VISIT (OUTPATIENT)
Dept: PRIMARY CARE CLINIC | Facility: CLINIC | Age: 23
End: 2023-08-30
Payer: COMMERCIAL

## 2023-08-30 ENCOUNTER — PATIENT MESSAGE (OUTPATIENT)
Dept: PRIMARY CARE CLINIC | Facility: CLINIC | Age: 23
End: 2023-08-30

## 2023-08-30 VITALS
OXYGEN SATURATION: 99 % | HEART RATE: 104 BPM | DIASTOLIC BLOOD PRESSURE: 60 MMHG | BODY MASS INDEX: 27.26 KG/M2 | RESPIRATION RATE: 17 BRPM | HEIGHT: 66 IN | SYSTOLIC BLOOD PRESSURE: 105 MMHG | WEIGHT: 169.6 LBS

## 2023-08-30 DIAGNOSIS — L73.9 FOLLICULITIS OF PERINEUM: Primary | ICD-10-CM

## 2023-08-30 DIAGNOSIS — I95.9 HYPOTENSION, UNSPECIFIED HYPOTENSION TYPE: ICD-10-CM

## 2023-08-30 PROBLEM — R42 DIZZINESS: Status: ACTIVE | Noted: 2023-08-30

## 2023-08-30 PROCEDURE — 99214 OFFICE O/P EST MOD 30 MIN: CPT | Performed by: FAMILY MEDICINE

## 2023-08-30 RX ORDER — CEPHALEXIN 500 MG/1
500 CAPSULE ORAL 2 TIMES DAILY
Qty: 20 CAPSULE | Refills: 0 | Status: SHIPPED | OUTPATIENT
Start: 2023-08-30

## 2023-08-30 NOTE — ASSESSMENT & PLAN NOTE
Low pressures at home with some dizziness, not taking anything but prenatal vitamins. BP reading today 110/60 when repeated by Dr. Greer Holland. Discussed not skipping meals, pushing electrolytes fluids like Gatorade, Powerade and Propel, and letting Ob/Gyn know if it gets worse.

## 2023-08-30 NOTE — ASSESSMENT & PLAN NOTE
Recurrent, like secondary to waxing. Will treat with Keflex and Neosporin, advised not to shave/wax until it resolves. Advised caution with waxing and considering Radha Aleta if she wants to remove the hair. Discussed use of warm compresses on the pustules as needed.

## 2023-08-30 NOTE — PATIENT INSTRUCTIONS
IT WAS GREAT TO SEE YOU TODAY! DRINK LOTS OF GATORADE, POWERADE AND PROPEL. PLEASE TAKE ALL MEDICATION AS DISCUSSED. ~TAKE THE KEFLEX TWICE A DAY FOR 10 DAYS.    ~APPLY NEOSPORIN TO BOTH CYSTS TWICE A DAY. KEEP THEM CLEAN AND DO NOT SHAVE, WAX OR USE JOHNSON UNTIL IT GETS BETTER.     PLEASE CALL WITH CONCERNS [phone]

## 2023-09-04 NOTE — TELEPHONE ENCOUNTER
From: Dr. Mahnaz Ramsey  To: Ligia Card: 8/30/2023 9:21 AM EDT  Subject: Clinical References    IT WAS GREAT TO SEE YOU TODAY! DRINK LOTS OF GATORADE, POWERADE AND PROPEL. PLEASE TAKE ALL MEDICATION AS DISCUSSED. ~TAKE THE KEFLEX TWICE A DAY FOR 10 DAYS.    ~APPLY NEOSPORIN TO BOTH CYSTS TWICE A DAY. KEEP THEM CLEAN AND DO NOT SHAVE, WAX OR USE JOHNSON UNTIL IT GETS BETTER.     PLEASE CALL WITH CONCERNS [phone]

## 2023-09-12 ENCOUNTER — ROUTINE PRENATAL (OUTPATIENT)
Dept: OBGYN CLINIC | Age: 23
End: 2023-09-12
Payer: COMMERCIAL

## 2023-09-12 VITALS
WEIGHT: 170 LBS | SYSTOLIC BLOOD PRESSURE: 110 MMHG | DIASTOLIC BLOOD PRESSURE: 68 MMHG | HEIGHT: 66 IN | BODY MASS INDEX: 27.32 KG/M2

## 2023-09-12 DIAGNOSIS — O09.291 HISTORY OF MACROSOMIA IN INFANT IN PRIOR PREGNANCY, CURRENTLY PREGNANT IN FIRST TRIMESTER: ICD-10-CM

## 2023-09-12 DIAGNOSIS — O09.891 SHORT INTERVAL BETWEEN PREGNANCIES AFFECTING PREGNANCY IN FIRST TRIMESTER, ANTEPARTUM: ICD-10-CM

## 2023-09-12 DIAGNOSIS — O36.80X0 ENCOUNTER TO DETERMINE FETAL VIABILITY OF PREGNANCY, SINGLE OR UNSPECIFIED FETUS: Primary | ICD-10-CM

## 2023-09-12 DIAGNOSIS — Z34.81 MULTIGRAVIDA IN FIRST TRIMESTER: ICD-10-CM

## 2023-09-12 DIAGNOSIS — Z82.79 FAMILY HISTORY OF CONGENITAL HEART DEFECT: ICD-10-CM

## 2023-09-12 PROBLEM — O99.013 ANEMIA AFFECTING PREGNANCY IN THIRD TRIMESTER: Status: RESOLVED | Noted: 2023-02-03 | Resolved: 2023-09-12

## 2023-09-12 PROCEDURE — 76801 OB US < 14 WKS SINGLE FETUS: CPT | Performed by: NURSE PRACTITIONER

## 2023-09-12 PROCEDURE — 99213 OFFICE O/P EST LOW 20 MIN: CPT | Performed by: NURSE PRACTITIONER

## 2023-09-12 ASSESSMENT — PATIENT HEALTH QUESTIONNAIRE - PHQ9
SUM OF ALL RESPONSES TO PHQ QUESTIONS 1-9: 0
2. FEELING DOWN, DEPRESSED OR HOPELESS: 0
SUM OF ALL RESPONSES TO PHQ QUESTIONS 1-9: 0
1. LITTLE INTEREST OR PLEASURE IN DOING THINGS: 0
SUM OF ALL RESPONSES TO PHQ9 QUESTIONS 1 & 2: 0
SUM OF ALL RESPONSES TO PHQ QUESTIONS 1-9: 0
SUM OF ALL RESPONSES TO PHQ QUESTIONS 1-9: 0

## 2023-09-12 NOTE — ASSESSMENT & PLAN NOTE
PTL/labor precautions, [place], and pregnancy warning signs reviewed. Pt advised to call the office at [phone] or go straight to Labor and Delivery at [facility] with any of the following concerns vaginal bleeding, leaking of fluid, zeeshan regularly Q 5-7 minutes for over an hour or not feeling the baby move. RTO 4 weeks OBV    D/W pt at length genetic testing that is recommended by ACOG -- NIPT, Quad screen (for trisomies and NTD), CF, SMA. Brief discussion of these diseases - conditions that may increase risks, etiology, carrier states, fetal effects, treatment options, etc was undertaken. D/W pt that these are screening tests/carrier screening test only and are NOT mandatory. We also discuss false POS/false neg rates. It is her decision whether to have them done and how to proceed with the information afterwards. All questions answered, pt understood and wishes to proceed with indicated tests.

## 2023-09-17 LAB
Lab: NORMAL
NTRA FETAL FRACTION: NORMAL
NTRA GENDER OF FETUS: NORMAL
NTRA MONOSOMY X AGE-BASED RISK TEXT: NORMAL
NTRA MONOSOMY X RESULT TEXT: NORMAL
NTRA MONOSOMY X RISK SCORE TEXT: NORMAL
NTRA TRIPLOIDY RESULT TEXT: NORMAL
NTRA TRISOMY 13 AGE-BASED RISK TEXT: NORMAL
NTRA TRISOMY 13 RESULT TEXT: NORMAL
NTRA TRISOMY 13 RISK SCORE TEXT: NORMAL
NTRA TRISOMY 18 AGE-BASED RISK TEXT: NORMAL
NTRA TRISOMY 18 RESULT TEXT: NORMAL
NTRA TRISOMY 18 RISK SCORE TEXT: NORMAL
NTRA TRISOMY 21 AGE-BASED RISK TEXT: NORMAL
NTRA TRISOMY 21 RESULT TEXT: NORMAL
NTRA TRISOMY 21 RISK SCORE TEXT: NORMAL

## 2023-09-28 ASSESSMENT — ENCOUNTER SYMPTOMS
SHORTNESS OF BREATH: 0
VOMITING: 0
RHINORRHEA: 1
DIARRHEA: 0
ABDOMINAL PAIN: 0
COUGH: 1
NAUSEA: 0

## 2023-09-29 ENCOUNTER — OFFICE VISIT (OUTPATIENT)
Dept: PRIMARY CARE CLINIC | Facility: CLINIC | Age: 23
End: 2023-09-29
Payer: COMMERCIAL

## 2023-09-29 VITALS
RESPIRATION RATE: 17 BRPM | DIASTOLIC BLOOD PRESSURE: 74 MMHG | HEIGHT: 66 IN | SYSTOLIC BLOOD PRESSURE: 118 MMHG | BODY MASS INDEX: 28.22 KG/M2 | HEART RATE: 89 BPM | OXYGEN SATURATION: 98 % | WEIGHT: 175.6 LBS

## 2023-09-29 DIAGNOSIS — Z20.828 EXPOSURE TO RESPIRATORY SYNCYTIAL VIRUS (RSV): ICD-10-CM

## 2023-09-29 DIAGNOSIS — H66.002 ACUTE SUPPURATIVE OTITIS MEDIA OF LEFT EAR WITHOUT SPONTANEOUS RUPTURE OF TYMPANIC MEMBRANE, RECURRENCE NOT SPECIFIED: Primary | ICD-10-CM

## 2023-09-29 DIAGNOSIS — Z20.822 EXPOSURE TO COVID-19 VIRUS: ICD-10-CM

## 2023-09-29 DIAGNOSIS — R09.81 NASAL CONGESTION: ICD-10-CM

## 2023-09-29 LAB
EXP DATE SOLUTION: NORMAL
EXP DATE SWAB: NORMAL
EXPIRATION DATE: NORMAL
GROUP A STREP ANTIGEN, POC: NEGATIVE
LOT NUMBER POC: NORMAL
LOT NUMBER SOLUTION: NORMAL
LOT NUMBER SWAB: NORMAL
QUICKVUE INFLUENZA TEST: NEGATIVE
RSV, POC: NEGATIVE
SARS-COV-2 RNA, POC: NEGATIVE
VALID INTERNAL CONTROL, POC: NORMAL
VALID INTERNAL CONTROL, POC: NORMAL
VALID INTERNAL CONTROL: NORMAL

## 2023-09-29 PROCEDURE — 87635 SARS-COV-2 COVID-19 AMP PRB: CPT | Performed by: FAMILY MEDICINE

## 2023-09-29 PROCEDURE — 99213 OFFICE O/P EST LOW 20 MIN: CPT | Performed by: FAMILY MEDICINE

## 2023-09-29 PROCEDURE — 87420 RESP SYNCYTIAL VIRUS AG IA: CPT | Performed by: FAMILY MEDICINE

## 2023-09-29 PROCEDURE — 87880 STREP A ASSAY W/OPTIC: CPT | Performed by: FAMILY MEDICINE

## 2023-09-29 PROCEDURE — 87804 INFLUENZA ASSAY W/OPTIC: CPT | Performed by: FAMILY MEDICINE

## 2023-09-29 RX ORDER — FLUCONAZOLE 150 MG/1
TABLET ORAL
Qty: 1 TABLET | Refills: 1 | Status: SHIPPED | OUTPATIENT
Start: 2023-09-29

## 2023-09-29 RX ORDER — AMOXICILLIN 875 MG/1
875 TABLET, COATED ORAL 2 TIMES DAILY
Qty: 20 TABLET | Refills: 0 | Status: SHIPPED | OUTPATIENT
Start: 2023-09-29 | End: 2023-10-09

## 2023-09-29 NOTE — PATIENT INSTRUCTIONS
IT WAS GREAT TO SEE YOU TODAY! ALL OF YOUR TESTS - COVID, FLU, STREP, RSV - WERE NEGATIVE. GOOD NEWS!    PLEASE TAKE ALL MEDICATION AS DISCUSSED. ~TAKE AMOXICILLIN TWICE A DAY WITH FOOD FOR YOUR EAR INFECTION.    ~ONLY TAKE THE DIFLUCAN IF NEEDED FOR VAGINAL ITCHING. ~TAKE TYLENOL IF NEEDED FOR PAIN.  USE A NASAL SALINE SPRAY TO HELP WITH CONGESTION.     DRINK LOTS OF WATER!    PLEASE CALL WITH CONCERNS [phone]

## 2023-09-29 NOTE — ASSESSMENT & PLAN NOTE
Patient with left ear pain and congestion with known exposure to COVID and RSV at home. All tests negative, L TM with erythema and bulging. Will treat with Amoxicillin. Patient asked for Diflucan, discussed risks during pregnancy and patient verbalizes understanding of the risks and still wants the pill in case of yeast infection. Rx sent to the pharmacy. Discussed use of nasal saline and Tylenol if needed for pain.

## 2023-09-29 NOTE — PROGRESS NOTES
Kirk DO Rossy Farley, [address], [address] [facility]  [phone]         ASSESSMENT AND PLAN    Problem List Items Addressed This Visit          Nervous and Auditory    Acute suppurative otitis media of left ear without spontaneous rupture of tympanic membrane - Primary      Patient with left ear pain and congestion with known exposure to COVID and RSV at home. All tests negative, L TM with erythema and bulging. Will treat with Amoxicillin. Patient asked for Diflucan, discussed risks during pregnancy and patient verbalizes understanding of the risks and still wants the pill in case of yeast infection. Rx sent to the pharmacy. Discussed use of nasal saline and Tylenol if needed for pain. Relevant Medications    amoxicillin (AMOXIL) 875 MG tablet    fluconazole (DIFLUCAN) 150 MG tablet    Other Relevant Orders    AMB POC COVID-19 COV (Completed)    AMB POC RAPID INFLUENZA TEST (Completed)    AMB POC RAPID STREP A (Completed)       Other    Nasal congestion    Relevant Orders    AMB POC COVID-19 COV (Completed)    AMB POC RAPID INFLUENZA TEST (Completed)    AMB POC RAPID STREP A (Completed)    AMB POC RSV (Completed)     Other Visit Diagnoses       Exposure to COVID-19 virus        Relevant Orders    AMB POC COVID-19 COV (Completed)    Exposure to respiratory syncytial virus (RSV)        Relevant Orders    AMB POC RSV (Completed)             The diagnoses and plan were discussed with the patient, who verbalizes understanding and agrees with plan. All questions answered. Chief Complaint    Chief Complaint   Patient presents with    Otalgia     Bilateral earache/bodyache /neck pain       HISTORY OF PRESENT ILLNESS    21 y.o. female at 16 weeks gestation presents today for congestion. Notes that she started having pain in her left ear a few days ago with some aches and congestion.   Has had bad breath and lives with nasal drainage and

## 2023-10-04 ENCOUNTER — PATIENT MESSAGE (OUTPATIENT)
Dept: PRIMARY CARE CLINIC | Facility: CLINIC | Age: 23
End: 2023-10-04

## 2023-10-05 RX ORDER — ONDANSETRON 4 MG/1
4 TABLET, ORALLY DISINTEGRATING ORAL 3 TIMES DAILY PRN
Qty: 21 TABLET | Refills: 0 | Status: SHIPPED | OUTPATIENT
Start: 2023-10-05

## 2023-10-05 RX ORDER — FLUCONAZOLE 150 MG/1
TABLET ORAL
Qty: 1 TABLET | Refills: 1 | Status: SHIPPED | OUTPATIENT
Start: 2023-10-05

## 2023-10-11 ENCOUNTER — ROUTINE PRENATAL (OUTPATIENT)
Dept: OBGYN CLINIC | Age: 23
End: 2023-10-11
Payer: COMMERCIAL

## 2023-10-11 VITALS
DIASTOLIC BLOOD PRESSURE: 76 MMHG | HEIGHT: 66 IN | WEIGHT: 175 LBS | SYSTOLIC BLOOD PRESSURE: 114 MMHG | BODY MASS INDEX: 28.12 KG/M2

## 2023-10-11 DIAGNOSIS — Z34.82 MULTIGRAVIDA IN SECOND TRIMESTER: ICD-10-CM

## 2023-10-11 DIAGNOSIS — O09.291 HISTORY OF MACROSOMIA IN INFANT IN PRIOR PREGNANCY, CURRENTLY PREGNANT IN FIRST TRIMESTER: ICD-10-CM

## 2023-10-11 DIAGNOSIS — O09.891 SHORT INTERVAL BETWEEN PREGNANCIES AFFECTING PREGNANCY IN FIRST TRIMESTER, ANTEPARTUM: ICD-10-CM

## 2023-10-11 PROCEDURE — 99213 OFFICE O/P EST LOW 20 MIN: CPT | Performed by: NURSE PRACTITIONER

## 2023-10-11 RX ORDER — POLYETHYLENE GLYCOL 3350 17 G/17G
17 POWDER, FOR SOLUTION ORAL DAILY
Qty: 510 G | Refills: 1 | Status: SHIPPED | OUTPATIENT
Start: 2023-10-11

## 2023-10-11 ASSESSMENT — PATIENT HEALTH QUESTIONNAIRE - PHQ9
1. LITTLE INTEREST OR PLEASURE IN DOING THINGS: 0
SUM OF ALL RESPONSES TO PHQ QUESTIONS 1-9: 0
SUM OF ALL RESPONSES TO PHQ QUESTIONS 1-9: 0
2. FEELING DOWN, DEPRESSED OR HOPELESS: 0
SUM OF ALL RESPONSES TO PHQ9 QUESTIONS 1 & 2: 0
SUM OF ALL RESPONSES TO PHQ QUESTIONS 1-9: 0
SUM OF ALL RESPONSES TO PHQ QUESTIONS 1-9: 0

## 2023-10-11 NOTE — PROGRESS NOTES
Patient comes in today for routine prenatal visit. No complaints/concerns today.      Fetal Movement: Yes  Contractions: No  Vaginal Bleeding: No  Leaking Fluid: No  GI/: Yes constipation, after she eats her stomach burns     Vitals:    10/11/23 0851   BP: 114/76   Site: Left Upper Arm   Position: Sitting   Weight: 175 lb (79.4 kg)   Height: 5' 6\" (1.676 m)

## 2023-11-08 ENCOUNTER — ROUTINE PRENATAL (OUTPATIENT)
Dept: OBGYN CLINIC | Age: 23
End: 2023-11-08
Payer: COMMERCIAL

## 2023-11-08 VITALS
WEIGHT: 177 LBS | BODY MASS INDEX: 28.45 KG/M2 | DIASTOLIC BLOOD PRESSURE: 66 MMHG | HEIGHT: 66 IN | SYSTOLIC BLOOD PRESSURE: 124 MMHG

## 2023-11-08 DIAGNOSIS — O09.291 HISTORY OF MACROSOMIA IN INFANT IN PRIOR PREGNANCY, CURRENTLY PREGNANT IN FIRST TRIMESTER: ICD-10-CM

## 2023-11-08 DIAGNOSIS — O09.899 SHORT INTERVAL BETWEEN PREGNANCIES AFFECTING PREGNANCY, ANTEPARTUM: ICD-10-CM

## 2023-11-08 DIAGNOSIS — Z34.82 MULTIGRAVIDA IN SECOND TRIMESTER: ICD-10-CM

## 2023-11-08 DIAGNOSIS — Z82.79 FAMILY HISTORY OF CONGENITAL HEART DEFECT: ICD-10-CM

## 2023-11-08 PROBLEM — L73.9 FOLLICULITIS OF PERINEUM: Status: RESOLVED | Noted: 2023-08-30 | Resolved: 2023-11-08

## 2023-11-08 PROBLEM — H66.002 ACUTE SUPPURATIVE OTITIS MEDIA OF LEFT EAR WITHOUT SPONTANEOUS RUPTURE OF TYMPANIC MEMBRANE: Status: RESOLVED | Noted: 2023-09-29 | Resolved: 2023-11-08

## 2023-11-08 PROBLEM — R09.81 NASAL CONGESTION: Status: RESOLVED | Noted: 2023-09-29 | Resolved: 2023-11-08

## 2023-11-08 PROCEDURE — 99213 OFFICE O/P EST LOW 20 MIN: CPT | Performed by: OBSTETRICS & GYNECOLOGY

## 2023-11-08 NOTE — PROGRESS NOTES
Patient comes in today for routine prenatal visit. Fetal Movement: Yes-flutters  Contractions: No  Vaginal Bleeding: No  Leaking Fluid: No  GI/: Yes-diarrhea/frequent bowel movements-educated patient, and recommended for patient to minimize use of miralax, patient confirms she stopped 3 days ago due to frequent Bms and has noticed slight improvement. Denies aches/fever/chills.      Vitals:    11/08/23 0934   BP: 124/66   Site: Right Upper Arm   Position: Sitting   Weight: 80.3 kg (177 lb)   Height: 1.676 m (5' 6\")

## 2023-11-10 LAB
AFP INTERP SERPL-IMP: NORMAL
AFP MOM SERPL: 1.26
AFP SERPL-MCNC: 50.7 NG/ML
AGE AT DELIVERY: 23.9 YR
AGE OF EGG DONOR: NO
COMMENT: NORMAL
DONOR EGG?: 126
DONOR EGG?: NO
GA METHOD: NORMAL
GA: 18.1 WEEKS
IDDM PATIENT QL: NO
Lab: 177
Lab: NORMAL
MAT SCN FOR FETAL ABNORMALITIES SERPL: NORMAL
MULTIPLE PREGNANCY: NO
NEURAL TUBE DEFECT RISK FETUS: 5411
NUMBER OF FETUSES: NO
OTHER INDICATIONS: NO
OTHER INDICATIONS: NORMAL
PREVIOUSLY ELEVATED AFP (Y OR N): 18
PREVIOUSLY ELEVATED AFP (Y OR N): NO
PRIOR 1ST TRIM TESTING ?: NO
PRIOR 2ND TRIM TESTING ?: NO
PRIOR DS/NTD SCREEN CURRENT PREGNANCY?: NO
PRIOR PREGNANCY WITH DOWN SYNDROME (Y OR N): 1
PRIOR PREGNANCY WITH DOWN SYNDROME (Y OR N): NO
TYPE OF EGG DONOR: NORMAL

## 2023-11-13 ENCOUNTER — TELEPHONE (OUTPATIENT)
Dept: OBGYN CLINIC | Age: 23
End: 2023-11-13

## 2023-11-22 ENCOUNTER — ROUTINE PRENATAL (OUTPATIENT)
Dept: OBGYN CLINIC | Age: 23
End: 2023-11-22
Payer: COMMERCIAL

## 2023-11-22 VITALS
BODY MASS INDEX: 28.61 KG/M2 | SYSTOLIC BLOOD PRESSURE: 118 MMHG | HEIGHT: 66 IN | WEIGHT: 178 LBS | DIASTOLIC BLOOD PRESSURE: 72 MMHG

## 2023-11-22 DIAGNOSIS — O09.899 SHORT INTERVAL BETWEEN PREGNANCIES AFFECTING PREGNANCY, ANTEPARTUM: ICD-10-CM

## 2023-11-22 DIAGNOSIS — Z82.79 FAMILY HISTORY OF CONGENITAL HEART DEFECT: ICD-10-CM

## 2023-11-22 DIAGNOSIS — Z36.89 ENCOUNTER FOR FETAL ANATOMIC SURVEY: Primary | ICD-10-CM

## 2023-11-22 DIAGNOSIS — O09.291 HISTORY OF MACROSOMIA IN INFANT IN PRIOR PREGNANCY, CURRENTLY PREGNANT IN FIRST TRIMESTER: ICD-10-CM

## 2023-11-22 DIAGNOSIS — Z34.82 MULTIGRAVIDA IN SECOND TRIMESTER: ICD-10-CM

## 2023-11-22 PROCEDURE — 76805 OB US >/= 14 WKS SNGL FETUS: CPT | Performed by: NURSE PRACTITIONER

## 2023-11-22 PROCEDURE — 99213 OFFICE O/P EST LOW 20 MIN: CPT | Performed by: NURSE PRACTITIONER

## 2023-11-22 NOTE — PROGRESS NOTES
I have reviewed the patient's visit today including history, exam and assessment by MARC Arellano. I agree with treatment/plan as above.     Farnaz Walsh MD  2:53 PM  11/22/23

## 2023-11-22 NOTE — PROGRESS NOTES
Patient comes in today for routine prenatal visit. No complaints/concerns today.      Fetal Movement: Yes  Contractions: No  Vaginal Bleeding: No  Leaking Fluid: No  GI/: No    Vitals:    11/22/23 1131   Weight: 80.7 kg (178 lb)   Height: 1.676 m (5' 6\")

## 2023-12-04 ENCOUNTER — HOSPITAL ENCOUNTER (OUTPATIENT)
Age: 23
Discharge: HOME | End: 2023-12-04
Attending: OBSTETRICS & GYNECOLOGY | Admitting: OBSTETRICS & GYNECOLOGY
Payer: COMMERCIAL

## 2023-12-04 VITALS
TEMPERATURE: 98 F | HEART RATE: 76 BPM | SYSTOLIC BLOOD PRESSURE: 105 MMHG | OXYGEN SATURATION: 96 % | RESPIRATION RATE: 14 BRPM | DIASTOLIC BLOOD PRESSURE: 58 MMHG

## 2023-12-04 PROCEDURE — 99283 EMERGENCY DEPT VISIT LOW MDM: CPT

## 2023-12-04 PROCEDURE — 87491 CHLMYD TRACH DNA AMP PROBE: CPT

## 2023-12-04 PROCEDURE — 87086 URINE CULTURE/COLONY COUNT: CPT

## 2023-12-04 PROCEDURE — 87661 TRICHOMONAS VAGINALIS AMPLIF: CPT

## 2023-12-04 PROCEDURE — 87591 N.GONORRHOEAE DNA AMP PROB: CPT

## 2023-12-04 NOTE — H&P
Ferritin 118 8 - 388 NG/ML   Transferrin Saturation    Collection Time: 08/15/23  1:41 PM   Result Value Ref Range    Iron 102 35 - 150 ug/dL    TIBC 262 250 - 450 ug/dL    TRANSFERRIN SATURATION 39 >20 %   Folate    Collection Time: 08/15/23  1:41 PM   Result Value Ref Range    Folate 37.6 (H) 3.1 - 17.5 ng/mL   Hemoglobinopathy Evaluation    Collection Time: 08/15/23  1:41 PM   Result Value Ref Range    Hemoglobin A/Hemoglobin Total 97.6 96.4 - 98.8 %    Hemoglobin S 0.0 0.0 %    Hemoglobin A2 2.4 1.8 - 3.2 %    Hemoglobin F 0.0 0.0 - 2.0 %    Interpretation Comment     Reticulocytes    Collection Time: 08/15/23  1:41 PM   Result Value Ref Range    Reticulocyte Count,Automated 2.7 (H) 0.3 - 2.0 %    Absolute Retic # 0.1124 (H) 0.026 - 0.095 M/ul    Immature Retic Fraction 12.0 3.0 - 15.9 %    Retic Hemoglobin conc. 35 29 - 35 pg   RPR    Collection Time: 08/15/23  1:41 PM   Result Value Ref Range    RPR NONREACTIVE NONREACTIVE     Panorama Prenatal Test    Collection Time: 09/17/23 11:02 AM   Result Value Ref Range    Report Summary LOW RISK     Report Note See Notes     Gender of Fetus Male     Fetal Fraction 11.0%     Trisomy 21 Result Text Low Risk     Trisomy 21 Age-Based Risk Text 1/983 (0.1%)     Trisomy 21 Risk Score Text <1/10,000 (<0.01%)     Trisomy 18 Result Text Low Risk     Trisomy 18 Age-Based Risk Text 1/1,993 (0.05%)     Trisomy 18 Risk Score Text <1/10,000 (<0.01%)     Trisomy 13 Result Text Low Risk     Trisomy 13 Age-Based Risk Text 1/6,347 (0.02%)     Trisomy 13 Risk Score Text <1/10,000 (<0.01%)     Monosomy X Result Text Low Risk     Monosomy X Age-Based Risk Text 1/255 (0.39%)     Monosomy X Risk Score Text <1/10,000 (<0.01%)     Triploidy Result Text Low Risk     Footnotes See Notes    AMB POC RAPID INFLUENZA TEST    Collection Time: 09/29/23  9:38 AM   Result Value Ref Range    Valid Internal Control, POC VALID     QuickVue Influenza test Negative Negative   AMB POC RSV    Collection Time:

## 2023-12-04 NOTE — PROGRESS NOTES
MD cleared pt to go home at this time. Urine culture sent off. Education provided.
Patient to [facility] triage with c/o cramping. No bleeding or SROM noted. Pt reports fetal movement.  Heart tones noted at 145bpm. Md aware of arrival.
No

## 2023-12-06 LAB
BACTERIA SPEC CULT: NORMAL
BACTERIA SPEC CULT: NORMAL
C TRACH RRNA SPEC QL NAA+PROBE: NEGATIVE
N GONORRHOEA RRNA SPEC QL NAA+PROBE: NEGATIVE
SERVICE CMNT-IMP: NORMAL
SPECIMEN SOURCE: NORMAL
T VAGINALIS RRNA SPEC QL NAA+PROBE: NEGATIVE

## 2023-12-19 DIAGNOSIS — Z34.82 MULTIGRAVIDA IN SECOND TRIMESTER: ICD-10-CM

## 2023-12-19 DIAGNOSIS — E53.8 VITAMIN B12 DEFICIENCY: ICD-10-CM

## 2023-12-19 PROBLEM — I95.9 HYPOTENSION: Status: RESOLVED | Noted: 2023-08-30 | Resolved: 2023-12-19

## 2023-12-19 PROBLEM — R42 DIZZINESS: Status: RESOLVED | Noted: 2023-08-30 | Resolved: 2023-12-19

## 2023-12-19 LAB
ERYTHROCYTE [DISTWIDTH] IN BLOOD BY AUTOMATED COUNT: 13.1 % (ref 11.9–14.6)
FERRITIN SERPL-MCNC: 22 NG/ML (ref 8–388)
FOLATE SERPL-MCNC: 19.9 NG/ML (ref 3.1–17.5)
HCT VFR BLD AUTO: 38.6 % (ref 35.8–46.3)
HGB BLD-MCNC: 12.6 G/DL (ref 11.7–15.4)
HGB RETIC QN AUTO: 35 PG (ref 29–35)
IMM RETICS NFR: 21.3 % (ref 3–15.9)
IRON SATN MFR SERPL: 20 %
IRON SERPL-MCNC: 71 UG/DL (ref 35–150)
MCH RBC QN AUTO: 32 PG (ref 26.1–32.9)
MCHC RBC AUTO-ENTMCNC: 32.6 G/DL (ref 31.4–35)
MCV RBC AUTO: 98 FL (ref 82–102)
NRBC # BLD: 0 K/UL (ref 0–0.2)
PLATELET # BLD AUTO: 176 K/UL (ref 150–450)
PMV BLD AUTO: 10.9 FL (ref 9.4–12.3)
RBC # BLD AUTO: 3.94 M/UL (ref 4.05–5.2)
RETICS # AUTO: 0.11 M/UL (ref 0.03–0.1)
RETICS/RBC NFR AUTO: 2.8 % (ref 0.3–2)
TIBC SERPL-MCNC: 348 UG/DL (ref 250–450)
VIT B12 SERPL-MCNC: 86 PG/ML (ref 193–986)
WBC # BLD AUTO: 8.8 K/UL (ref 4.3–11.1)

## 2023-12-20 PROBLEM — E53.8 FOLATE DEFICIENCY: Status: ACTIVE | Noted: 2023-12-20

## 2024-01-08 RX ORDER — ONDANSETRON 4 MG/1
4 TABLET, ORALLY DISINTEGRATING ORAL 3 TIMES DAILY PRN
Qty: 21 TABLET | Refills: 0 | Status: SHIPPED | OUTPATIENT
Start: 2024-01-08

## 2024-01-18 ENCOUNTER — PROCEDURE VISIT (OUTPATIENT)
Dept: OBGYN CLINIC | Age: 24
End: 2024-01-18
Payer: COMMERCIAL

## 2024-01-18 ENCOUNTER — ROUTINE PRENATAL (OUTPATIENT)
Dept: OBGYN CLINIC | Age: 24
End: 2024-01-18
Payer: COMMERCIAL

## 2024-01-18 VITALS
BODY MASS INDEX: 29.89 KG/M2 | DIASTOLIC BLOOD PRESSURE: 64 MMHG | SYSTOLIC BLOOD PRESSURE: 120 MMHG | WEIGHT: 186 LBS | HEIGHT: 66 IN

## 2024-01-18 DIAGNOSIS — Z34.83 MULTIGRAVIDA IN THIRD TRIMESTER: ICD-10-CM

## 2024-01-18 DIAGNOSIS — O09.291 HISTORY OF MACROSOMIA IN INFANT IN PRIOR PREGNANCY, CURRENTLY PREGNANT IN FIRST TRIMESTER: Primary | ICD-10-CM

## 2024-01-18 DIAGNOSIS — Z34.83 MULTIGRAVIDA IN THIRD TRIMESTER: Primary | ICD-10-CM

## 2024-01-18 DIAGNOSIS — O09.299 HX OF MACROSOMIA IN INFANT IN PRIOR PREGNANCY, CURRENTLY PREGNANT: ICD-10-CM

## 2024-01-18 DIAGNOSIS — O09.899 SHORT INTERVAL BETWEEN PREGNANCIES AFFECTING PREGNANCY, ANTEPARTUM: ICD-10-CM

## 2024-01-18 DIAGNOSIS — N39.46 MIXED INCONTINENCE: ICD-10-CM

## 2024-01-18 LAB
BASOPHILS # BLD: 0 K/UL (ref 0–0.2)
BASOPHILS NFR BLD: 1 % (ref 0–2)
DIFFERENTIAL METHOD BLD: ABNORMAL
EOSINOPHIL # BLD: 0.2 K/UL (ref 0–0.8)
EOSINOPHIL NFR BLD: 3 % (ref 0.5–7.8)
ERYTHROCYTE [DISTWIDTH] IN BLOOD BY AUTOMATED COUNT: 12.7 % (ref 11.9–14.6)
GLUCOSE 1 HOUR: 121 MG/DL
HCT VFR BLD AUTO: 36.3 % (ref 35.8–46.3)
HGB BLD-MCNC: 12.1 G/DL (ref 11.7–15.4)
IMM GRANULOCYTES # BLD AUTO: 0 K/UL (ref 0–0.5)
IMM GRANULOCYTES NFR BLD AUTO: 0 % (ref 0–5)
LYMPHOCYTES # BLD: 2.4 K/UL (ref 0.5–4.6)
LYMPHOCYTES NFR BLD: 28 % (ref 13–44)
MCH RBC QN AUTO: 31.6 PG (ref 26.1–32.9)
MCHC RBC AUTO-ENTMCNC: 33.3 G/DL (ref 31.4–35)
MCV RBC AUTO: 94.8 FL (ref 82–102)
MONOCYTES # BLD: 0.3 K/UL (ref 0.1–1.3)
MONOCYTES NFR BLD: 4 % (ref 4–12)
NEUTS SEG # BLD: 5.6 K/UL (ref 1.7–8.2)
NEUTS SEG NFR BLD: 64 % (ref 43–78)
NRBC # BLD: 0 K/UL (ref 0–0.2)
PLATELET # BLD AUTO: 163 K/UL (ref 150–450)
PMV BLD AUTO: 10.9 FL (ref 9.4–12.3)
RBC # BLD AUTO: 3.83 M/UL (ref 4.05–5.2)
WBC # BLD AUTO: 8.6 K/UL (ref 4.3–11.1)

## 2024-01-18 PROCEDURE — 76816 OB US FOLLOW-UP PER FETUS: CPT | Performed by: OBSTETRICS & GYNECOLOGY

## 2024-01-18 PROCEDURE — 99213 OFFICE O/P EST LOW 20 MIN: CPT | Performed by: OBSTETRICS & GYNECOLOGY

## 2024-01-18 RX ORDER — ACETAMINOPHEN 325 MG/1
650 TABLET ORAL EVERY 6 HOURS PRN
COMMUNITY

## 2024-01-18 NOTE — ASSESSMENT & PLAN NOTE
Educated patient of signs and symptoms of  labor including but not limited to regular uterine contractions every 5-7 minutes for 1 hour, vaginal bleeding or leakage of fluid to seek immediate care.

## 2024-01-18 NOTE — PATIENT INSTRUCTIONS
We will call you if anything is abnormal from your testing today.  If you develop signs and symptoms of  labor including but not limited to regular uterine contractions every 5-7 minutes for 1 hour, vaginal bleeding or leakage of fluid please contact our office and/or seek immediate care.  Thanks for coming to see us today and letting us take care of you!

## 2024-01-18 NOTE — PROGRESS NOTES
Patient comes in today for routine prenatal visit. No complaints/concerns today.     Glucola finished @ 2:30pm. Tdap VIS sheet given, patient wants Tdap at next visit.     Fetal Movement: Yes  Contractions: No  Vaginal Bleeding: No  Leaking Fluid: No  GI/: No    Vitals:    01/18/24 1445   BP: 120/64   Site: Right Upper Arm   Position: Sitting   Weight: 84.4 kg (186 lb)   Height: 1.676 m (5' 6\")

## 2024-02-01 ENCOUNTER — ROUTINE PRENATAL (OUTPATIENT)
Dept: OBGYN CLINIC | Age: 24
End: 2024-02-01
Payer: COMMERCIAL

## 2024-02-01 VITALS
HEIGHT: 66 IN | DIASTOLIC BLOOD PRESSURE: 68 MMHG | WEIGHT: 189 LBS | SYSTOLIC BLOOD PRESSURE: 110 MMHG | BODY MASS INDEX: 30.37 KG/M2

## 2024-02-01 DIAGNOSIS — N39.46 MIXED INCONTINENCE: ICD-10-CM

## 2024-02-01 DIAGNOSIS — Z34.83 MULTIGRAVIDA IN THIRD TRIMESTER: ICD-10-CM

## 2024-02-01 DIAGNOSIS — O09.299 HX OF MACROSOMIA IN INFANT IN PRIOR PREGNANCY, CURRENTLY PREGNANT: ICD-10-CM

## 2024-02-01 DIAGNOSIS — O09.899 SHORT INTERVAL BETWEEN PREGNANCIES AFFECTING PREGNANCY, ANTEPARTUM: Primary | ICD-10-CM

## 2024-02-01 PROCEDURE — 99213 OFFICE O/P EST LOW 20 MIN: CPT | Performed by: OBSTETRICS & GYNECOLOGY

## 2024-02-01 RX ORDER — LANOLIN ALCOHOL/MO/W.PET/CERES
400 CREAM (GRAM) TOPICAL DAILY
COMMUNITY

## 2024-02-01 RX ORDER — CHOLECALCIFEROL (VITAMIN D3) 125 MCG
500 CAPSULE ORAL DAILY
COMMUNITY

## 2024-02-01 NOTE — PROGRESS NOTES
Chief Complaint   Patient presents with    Routine Prenatal Visit        This 23 y.o.  at 30w1d with Estimated Date of Delivery: 4/10/24 presents for routine prenatal visit. Patient has no complaints today. Pt reports good FM, no LOF, VB, ctx. Pt denies H/A, vision changes, abdom pain, N/V.    Vitals:    24 1037   BP: 110/68   Site: Right Upper Arm   Position: Sitting   Weight: 85.7 kg (189 lb)   Height: 1.676 m (5' 6\")        Patient Active Problem List    Diagnosis Date Noted    Folate deficiency 2023    Multigravida in third trimester 08/15/2023     Overview Note:     EDC by 5 6/7 week US (known LMP)    2019: CF/SMA negative (prev pregnancy)  23: NIPT Low risk, male  23:  AFP only neg       Assessment & Plan Note:     Educated patient of signs and symptoms of  labor including but not limited to regular uterine contractions every 5-7 minutes for 1 hour, vaginal bleeding or leakage of fluid to seek immediate care.       Short interval between pregnancies affecting pregnancy, antepartum 08/15/2023     Overview Note:     PLAN:  Serial growth in 3rd trimester    24:  EFW 67%, AC 71%, HODA 16.8 cm, vtx       Assessment & Plan Note:     noted      Hx of macrosomia in infant in prior pregnancy, currently pregnant 08/15/2023     Overview Note:     G1 - 10lb 13oz,  G4 - 11lb 7oz    PLAN:  Serial growth in 3rd trimester    24:  EFW 67%, AC 71%, HODA 16.8 cm, vtx       Assessment & Plan Note:     noted      Mixed incontinence 2023     Overview Note:     noted       Assessment & Plan Note:            Vitamin B12 deficiency 2023      Problem List Items Addressed This Visit              Short interval between pregnancies affecting pregnancy, antepartum - Primary     noted            Other    Mixed incontinence               Multigravida in third trimester     Educated patient of signs and symptoms of  labor including but not limited to regular uterine

## 2024-02-01 NOTE — PATIENT INSTRUCTIONS
If you develop signs and symptoms of  labor including but not limited to regular uterine contractions every 5-7 minutes for 1 hour, vaginal bleeding or leakage of fluid please contact our office and/or seek immediate care.  Thanks for coming to see us today and letting us take care of you!

## 2024-02-01 NOTE — PROGRESS NOTES
Patient comes in today for routine prenatal visit.     Fetal Movement: Yes  Contractions: No  Vaginal Bleeding: No  Leaking Fluid: No  GI/: Yes, stomach pain at the top    Vitals:    02/01/24 1037   BP: 110/68   Site: Right Upper Arm   Position: Sitting   Weight: 85.7 kg (189 lb)   Height: 1.676 m (5' 6\")

## 2024-02-15 ENCOUNTER — PROCEDURE VISIT (OUTPATIENT)
Dept: OBGYN CLINIC | Age: 24
End: 2024-02-15
Payer: COMMERCIAL

## 2024-02-15 ENCOUNTER — ROUTINE PRENATAL (OUTPATIENT)
Dept: OBGYN CLINIC | Age: 24
End: 2024-02-15

## 2024-02-15 VITALS
WEIGHT: 191 LBS | HEIGHT: 66 IN | BODY MASS INDEX: 30.7 KG/M2 | DIASTOLIC BLOOD PRESSURE: 68 MMHG | SYSTOLIC BLOOD PRESSURE: 106 MMHG

## 2024-02-15 DIAGNOSIS — Z34.83 MULTIGRAVIDA IN THIRD TRIMESTER: ICD-10-CM

## 2024-02-15 DIAGNOSIS — O09.899 SHORT INTERVAL BETWEEN PREGNANCIES AFFECTING PREGNANCY, ANTEPARTUM: Primary | ICD-10-CM

## 2024-02-15 DIAGNOSIS — O09.299 HX OF MACROSOMIA IN INFANT IN PRIOR PREGNANCY, CURRENTLY PREGNANT: Primary | ICD-10-CM

## 2024-02-15 DIAGNOSIS — O09.899 SHORT INTERVAL BETWEEN PREGNANCIES AFFECTING PREGNANCY, ANTEPARTUM: ICD-10-CM

## 2024-02-15 DIAGNOSIS — O09.299 HX OF MACROSOMIA IN INFANT IN PRIOR PREGNANCY, CURRENTLY PREGNANT: ICD-10-CM

## 2024-02-15 PROCEDURE — 76816 OB US FOLLOW-UP PER FETUS: CPT | Performed by: OBSTETRICS & GYNECOLOGY

## 2024-02-15 NOTE — PROGRESS NOTES
Patient comes in today for routine prenatal visit. No complaints/concerns today.     Fetal Movement: Yes  Contractions: No  Vaginal Bleeding: No  Leaking Fluid: No  GI/: No    Vitals:    02/15/24 1447   BP: 106/68   Site: Left Upper Arm   Position: Sitting   Weight: 86.6 kg (191 lb)   Height: 1.676 m (5' 6\")        
Overview Note:     noted      Vitamin B12 deficiency 2023      Problem List Items Addressed This Visit              Short interval between pregnancies affecting pregnancy, antepartum - Primary     noted            Other    Multigravida in third trimester     If you develop signs and symptoms of  labor including but not limited to regular uterine contractions every 5-7 minutes for 1 hour, vaginal bleeding or leakage of fluid please contact our office and/or seek immediate care.  Thanks for coming to see us today and letting us take care of you!           Hx of macrosomia in infant in prior pregnancy, currently pregnant     D/W pt to attempt to limit carbohydrate intake for the remainder of pregnancy to help reduce potential excessive growth and/or HODA.  D/W her that this includes any with sugar in it (sweets, desserts, etc.), breads, potatoes, rice and pasta.              Kali Fang MD     3:01 PM    02/15/24

## 2024-02-25 RX ORDER — ONDANSETRON 4 MG/1
4 TABLET, ORALLY DISINTEGRATING ORAL 3 TIMES DAILY PRN
Qty: 21 TABLET | Refills: 0 | Status: SHIPPED | OUTPATIENT
Start: 2024-02-25

## 2024-02-29 ENCOUNTER — ROUTINE PRENATAL (OUTPATIENT)
Dept: OBGYN CLINIC | Age: 24
End: 2024-02-29
Payer: COMMERCIAL

## 2024-02-29 VITALS
BODY MASS INDEX: 31.66 KG/M2 | WEIGHT: 197 LBS | SYSTOLIC BLOOD PRESSURE: 110 MMHG | HEIGHT: 66 IN | DIASTOLIC BLOOD PRESSURE: 68 MMHG

## 2024-02-29 DIAGNOSIS — M54.50 LOW BACK PAIN, UNSPECIFIED BACK PAIN LATERALITY, UNSPECIFIED CHRONICITY, UNSPECIFIED WHETHER SCIATICA PRESENT: ICD-10-CM

## 2024-02-29 DIAGNOSIS — O09.899 SHORT INTERVAL BETWEEN PREGNANCIES AFFECTING PREGNANCY, ANTEPARTUM: Primary | ICD-10-CM

## 2024-02-29 DIAGNOSIS — O09.299 HX OF MACROSOMIA IN INFANT IN PRIOR PREGNANCY, CURRENTLY PREGNANT: ICD-10-CM

## 2024-02-29 DIAGNOSIS — Z34.83 MULTIGRAVIDA IN THIRD TRIMESTER: ICD-10-CM

## 2024-02-29 DIAGNOSIS — E53.8 VITAMIN B12 DEFICIENCY: ICD-10-CM

## 2024-02-29 LAB
BILIRUBIN, URINE, POC: NEGATIVE
BLOOD URINE, POC: NEGATIVE
GLUCOSE URINE, POC: NEGATIVE
KETONES, URINE, POC: NEGATIVE
LEUKOCYTE ESTERASE, URINE, POC: NEGATIVE
NITRITE, URINE, POC: NEGATIVE
PH, URINE, POC: 7 (ref 4.6–8)
PROTEIN,URINE, POC: NEGATIVE
SPECIFIC GRAVITY, URINE, POC: 1.01 (ref 1–1.03)
URINALYSIS CLARITY, POC: CLEAR
URINALYSIS COLOR, POC: YELLOW
UROBILINOGEN, POC: NORMAL

## 2024-02-29 PROCEDURE — 99213 OFFICE O/P EST LOW 20 MIN: CPT | Performed by: NURSE PRACTITIONER

## 2024-02-29 PROCEDURE — 81001 URINALYSIS AUTO W/SCOPE: CPT | Performed by: NURSE PRACTITIONER

## 2024-02-29 NOTE — PROGRESS NOTES
Patient comes in today for routine prenatal visit. Pt states she has had lower back pain. Pt also states for the last 3 days she has had lower abdominal pain, sharp pain that last about 40 seconds. Also feels more pressure in pelvic area.      Fetal Movement: Yes  Contractions: Yes not consistent   Vaginal Bleeding: No  Leaking Fluid: No  GI/: No    Vitals:    02/29/24 0859   BP: 110/68   Site: Left Upper Arm   Position: Sitting   Weight: 89.4 kg (197 lb)   Height: 1.676 m (5' 6\")

## 2024-02-29 NOTE — ASSESSMENT & PLAN NOTE
PTL/labor precautions, FMC, and pregnancy warning signs reviewed. Pt advised to call the office at [phone] or go straight to Labor and Delivery at [facility] with any of the following concerns vaginal bleeding, leaking of fluid, zeeshan regularly Q 5-7 minutes for over an hour or not feeling the baby move.   RTO 2 weeks OBV, US, GBS, GC, anemia labs

## 2024-02-29 NOTE — PROGRESS NOTES
This is a 23 y.o.   at 34w1d for routine OB visit.    Her Estimated Due Date is 4/10/2024, by Ultrasound    Denies leaking of fluid, vaginal bleeding, or regular contractions. Reports fetal movement.   Reports feeling more pelvic pressure    Current Outpatient Medications on File Prior to Visit   Medication Sig Dispense Refill    ondansetron (ZOFRAN-ODT) 4 MG disintegrating tablet TAKE 1 TABLET BY MOUTH 3 TIMES DAILY AS NEEDED FOR NAUSEA OR VOMITING 21 tablet 0    vitamin B-12 (CYANOCOBALAMIN) 500 MCG tablet Take 1 tablet by mouth daily      folic acid (FOLVITE) 400 MCG tablet Take 1 tablet by mouth daily      acetaminophen (TYLENOL) 325 MG tablet Take 2 tablets by mouth every 6 hours as needed for Pain      docusate sodium (COLACE) 100 MG capsule Take 1 capsule by mouth 2 times daily 60 capsule 11    Prenatal MV & Min w/FA-DHA (PRENATAL ADULT GUMMY/DHA/FA PO) Take by mouth       No current facility-administered medications on file prior to visit.       Allergies   Allergen Reactions    Latex Rash    Adhesive Tape Hives     itching       OB History    Para Term  AB Living   5 3 3 0 1 3   SAB IAB Ectopic Molar Multiple Live Births   1 0 0 0 0 3       # 1 - Date: 10/14/17, Sex: Male, Weight: 4.93 kg (10 lb 13.9 oz), GA: 41w0d, Delivery: Vaginal, Spontaneous, Apgar1: 8, Apgar5: 9, Living: Living, Birth Comments: None    # 2 - Date: 19, Sex: Female, Weight: 3.965 kg (8 lb 11.9 oz), GA: 40w3d, Delivery: Vaginal, Spontaneous, Apgar1: 9, Apgar5: 9, Living: Living, Birth Comments: None    # 3 - Date: 2021, Sex: None, Weight: None, GA: None, Delivery: None, Apgar1: None, Apgar5: None, Living: None, Birth Comments: None    # 4 - Date: 23, Sex: Female, Weight: 5.195 kg (11 lb 7.3 oz), GA: 39w1d, Delivery: Vaginal, Spontaneous, Apgar1: 8, Apgar5: 9, Living: Living, Birth Comments: None    # 5 - Date: None, Sex: None, Weight: None, GA: None, Delivery: None, Apgar1: None, Apgar5: None, Living:

## 2024-02-29 NOTE — PROGRESS NOTES
I have reviewed the patient's visit today including history, exam and assessment by MARC Ghosh.  I agree with treatment/plan as above.    Kali Fang MD  9:53 AM  02/29/24

## 2024-02-29 NOTE — PROGRESS NOTES
Chaperone for Intimate Exam     Chaperone was offer accepted as part of the rooming process    Chaperone: Olena Gonzalez

## 2024-03-14 ENCOUNTER — PROCEDURE VISIT (OUTPATIENT)
Dept: OBGYN CLINIC | Age: 24
End: 2024-03-14
Payer: COMMERCIAL

## 2024-03-14 ENCOUNTER — ROUTINE PRENATAL (OUTPATIENT)
Dept: OBGYN CLINIC | Age: 24
End: 2024-03-14
Payer: COMMERCIAL

## 2024-03-14 VITALS
BODY MASS INDEX: 31.82 KG/M2 | WEIGHT: 198 LBS | HEIGHT: 66 IN | DIASTOLIC BLOOD PRESSURE: 66 MMHG | SYSTOLIC BLOOD PRESSURE: 122 MMHG

## 2024-03-14 DIAGNOSIS — E53.8 FOLATE DEFICIENCY: ICD-10-CM

## 2024-03-14 DIAGNOSIS — O09.299 HX OF MACROSOMIA IN INFANT IN PRIOR PREGNANCY, CURRENTLY PREGNANT: Primary | ICD-10-CM

## 2024-03-14 DIAGNOSIS — Z34.83 MULTIGRAVIDA IN THIRD TRIMESTER: ICD-10-CM

## 2024-03-14 DIAGNOSIS — O09.899 SHORT INTERVAL BETWEEN PREGNANCIES AFFECTING PREGNANCY, ANTEPARTUM: ICD-10-CM

## 2024-03-14 DIAGNOSIS — O09.299 HX OF MACROSOMIA IN INFANT IN PRIOR PREGNANCY, CURRENTLY PREGNANT: ICD-10-CM

## 2024-03-14 DIAGNOSIS — O26.893 VAGINAL DISCHARGE DURING PREGNANCY IN THIRD TRIMESTER: ICD-10-CM

## 2024-03-14 DIAGNOSIS — Z34.83 MULTIGRAVIDA IN THIRD TRIMESTER: Primary | ICD-10-CM

## 2024-03-14 DIAGNOSIS — E53.8 VITAMIN B12 DEFICIENCY: ICD-10-CM

## 2024-03-14 DIAGNOSIS — N89.8 VAGINAL DISCHARGE DURING PREGNANCY IN THIRD TRIMESTER: ICD-10-CM

## 2024-03-14 LAB
ERYTHROCYTE [DISTWIDTH] IN BLOOD BY AUTOMATED COUNT: 15.4 % (ref 11.9–14.6)
FERRITIN SERPL-MCNC: 29 NG/ML (ref 8–388)
FOLATE SERPL-MCNC: 17.4 NG/ML (ref 3.1–17.5)
HCT VFR BLD AUTO: 33.3 % (ref 35.8–46.3)
HGB BLD-MCNC: 11 G/DL (ref 11.7–15.4)
HGB RETIC QN AUTO: 34 PG (ref 29–35)
IMM RETICS NFR: 28 % (ref 3–15.9)
IRON SATN MFR SERPL: 15 %
IRON SERPL-MCNC: 61 UG/DL (ref 35–150)
MCH RBC QN AUTO: 31.5 PG (ref 26.1–32.9)
MCHC RBC AUTO-ENTMCNC: 33 G/DL (ref 31.4–35)
MCV RBC AUTO: 95.4 FL (ref 82–102)
NRBC # BLD: 0 K/UL (ref 0–0.2)
PLATELET # BLD AUTO: 155 K/UL (ref 150–450)
PMV BLD AUTO: 10.7 FL (ref 9.4–12.3)
RBC # BLD AUTO: 3.49 M/UL (ref 4.05–5.2)
RETICS # AUTO: 0.15 M/UL (ref 0.03–0.1)
RETICS/RBC NFR AUTO: 4.4 % (ref 0.3–2)
TIBC SERPL-MCNC: 394 UG/DL (ref 250–450)
VIT B12 SERPL-MCNC: 148 PG/ML (ref 193–986)
WBC # BLD AUTO: 7.8 K/UL (ref 4.3–11.1)

## 2024-03-14 PROCEDURE — 99459 PELVIC EXAMINATION: CPT | Performed by: NURSE PRACTITIONER

## 2024-03-14 PROCEDURE — 99213 OFFICE O/P EST LOW 20 MIN: CPT | Performed by: NURSE PRACTITIONER

## 2024-03-14 PROCEDURE — 76816 OB US FOLLOW-UP PER FETUS: CPT | Performed by: OBSTETRICS & GYNECOLOGY

## 2024-03-14 NOTE — ASSESSMENT & PLAN NOTE
PTL/labor precautions, FMC, and pregnancy warning signs reviewed. Pt advised to call the office at [phone] or go straight to Labor and Delivery at [facility] with any of the following concerns vaginal bleeding, leaking of fluid, zeeshan regularly Q 5-7 minutes for over an hour or not feeling the baby move.   RTO 1 wk OBV

## 2024-03-14 NOTE — PROGRESS NOTES
This is a 23 y.o.   at 36w1d for routine OB visit.    Her Estimated Due Date is 4/10/2024, by Ultrasound    Denies leaking of fluid, vaginal bleeding, or regular contractions. Reports fetal movement.     Current Outpatient Medications on File Prior to Visit   Medication Sig Dispense Refill    ondansetron (ZOFRAN-ODT) 4 MG disintegrating tablet TAKE 1 TABLET BY MOUTH 3 TIMES DAILY AS NEEDED FOR NAUSEA OR VOMITING 21 tablet 0    vitamin B-12 (CYANOCOBALAMIN) 500 MCG tablet Take 1 tablet by mouth daily      folic acid (FOLVITE) 400 MCG tablet Take 1 tablet by mouth daily      acetaminophen (TYLENOL) 325 MG tablet Take 2 tablets by mouth every 6 hours as needed for Pain      docusate sodium (COLACE) 100 MG capsule Take 1 capsule by mouth 2 times daily 60 capsule 11    Prenatal MV & Min w/FA-DHA (PRENATAL ADULT GUMMY/DHA/FA PO) Take by mouth       No current facility-administered medications on file prior to visit.       Allergies   Allergen Reactions    Latex Rash    Adhesive Tape Hives     itching       OB History    Para Term  AB Living   5 3 3 0 1 3   SAB IAB Ectopic Molar Multiple Live Births   1 0 0 0 0 3       # 1 - Date: 10/14/17, Sex: Male, Weight: 4.93 kg (10 lb 13.9 oz), GA: 41w0d, Delivery: Vaginal, Spontaneous, Apgar1: 8, Apgar5: 9, Living: Living, Birth Comments: None    # 2 - Date: 19, Sex: Female, Weight: 3.965 kg (8 lb 11.9 oz), GA: 40w3d, Delivery: Vaginal, Spontaneous, Apgar1: 9, Apgar5: 9, Living: Living, Birth Comments: None    # 3 - Date: 2021, Sex: None, Weight: None, GA: None, Delivery: None, Apgar1: None, Apgar5: None, Living: None, Birth Comments: None    # 4 - Date: 23, Sex: Female, Weight: 5.195 kg (11 lb 7.3 oz), GA: 39w1d, Delivery: Vaginal, Spontaneous, Apgar1: 8, Apgar5: 9, Living: Living, Birth Comments: None    # 5 - Date: None, Sex: None, Weight: None, GA: None, Delivery: None, Apgar1: None, Apgar5: None, Living: None, Birth Comments: None        Past

## 2024-03-14 NOTE — PROGRESS NOTES
Patient comes in today for routine prenatal visit.  Patient states concern of increased clear discharge that started approx 1 week ago. Patient denies odor, itching, burning.     Chaperone for Intimate Exam     Chaperone was offer accepted as part of the rooming process    Chaperone: Jodi Downs      Fetal Movement: Yes  Contractions: Yes-BH  Vaginal Bleeding: No  Leaking Fluid: No  GI/: yes-constipation, educated patient on her other options.     Vitals:    03/14/24 1052   BP: 122/66   Site: Right Upper Arm   Position: Sitting   Weight: 89.8 kg (198 lb)   Height: 1.676 m (5' 6\")

## 2024-03-14 NOTE — PATIENT INSTRUCTIONS
Thank you for coming  Return to the office in 1 week  Please call if you have any abdominal pain, & 5 contractions in 1 hour, vaginal bleeding or spotting, or leaking of fluid.  You should feel the baby move 10 times in an hour. Monitor this once a day. if you do not feel the baby move this often please call  Please call immediately if you have a headache that won't go away with tylenol, changes to your vision like funny lights or blurriness, nausea or vomiting, upper abdominal pain, or if the baby does not move at least 10 times in 2 hours.

## 2024-03-14 NOTE — PROGRESS NOTES
I have reviewed the patient's visit today including history, exam and assessment by MARC Ghosh.  I agree with treatment/plan as above.    Kali Fang MD  11:34 AM  03/14/24

## 2024-03-15 PROBLEM — E53.8 FOLATE DEFICIENCY: Status: RESOLVED | Noted: 2023-12-20 | Resolved: 2024-03-15

## 2024-03-17 LAB
BACTERIA SPEC CULT: NORMAL
SERVICE CMNT-IMP: NORMAL

## 2024-03-19 LAB
BACTERIA SPEC CULT: NORMAL
SERVICE CMNT-IMP: NORMAL

## 2024-03-21 ENCOUNTER — ROUTINE PRENATAL (OUTPATIENT)
Dept: OBGYN CLINIC | Age: 24
End: 2024-03-21
Payer: COMMERCIAL

## 2024-03-21 VITALS
HEIGHT: 66 IN | DIASTOLIC BLOOD PRESSURE: 60 MMHG | WEIGHT: 199 LBS | BODY MASS INDEX: 31.98 KG/M2 | SYSTOLIC BLOOD PRESSURE: 110 MMHG

## 2024-03-21 DIAGNOSIS — O09.299 HX OF MACROSOMIA IN INFANT IN PRIOR PREGNANCY, CURRENTLY PREGNANT: ICD-10-CM

## 2024-03-21 DIAGNOSIS — Z34.83 MULTIGRAVIDA IN THIRD TRIMESTER: ICD-10-CM

## 2024-03-21 DIAGNOSIS — O09.899 SHORT INTERVAL BETWEEN PREGNANCIES AFFECTING PREGNANCY, ANTEPARTUM: Primary | ICD-10-CM

## 2024-03-21 PROCEDURE — 99213 OFFICE O/P EST LOW 20 MIN: CPT | Performed by: OBSTETRICS & GYNECOLOGY

## 2024-03-21 NOTE — PROGRESS NOTES
Chief Complaint   Patient presents with    Routine Prenatal Visit        This 23 y.o.  at 37w1d with Estimated Date of Delivery: 4/10/24 presents for routine prenatal visit. Patient has no complaints today. Pt reports good FM, no LOF, VB, ctx. Pt denies H/A, vision changes, abdom pain, N/V.    Vitals:    24 0954   BP: 110/60   Site: Left Upper Arm   Position: Sitting   Weight: 90.3 kg (199 lb)   Height: 1.676 m (5' 6\")        Patient Active Problem List    Diagnosis Date Noted    Multigravida in third trimester 08/15/2023     Overview Note:     EDC by 5 6/7 week US (known LMP)    2019: CF/SMA negative (prev pregnancy)  23: NIPT Low risk, male  23:  AFP only neg  2/15/24:  tdap given       Assessment & Plan Note:     Educated patient of signs and symptoms of labor including but not limited to regular uterine contractions every 5-7 minutes for 1 hour, vaginal bleeding or leakage of fluid to seek immediate care.       Short interval between pregnancies affecting pregnancy, antepartum 08/15/2023     Overview Note:     PLAN:  Serial growth in 3rd trimester    24:  EFW 67%, AC 71%, HODA 16.8 cm, vtx  2/15/24:  EFW 86%, AC 82%, HODA 17.3 cm, vtx  3/14/24: EFW 77%, AC 75%, HODA nl, vertex       Assessment & Plan Note:     noted      Hx of macrosomia in infant in prior pregnancy, currently pregnant 08/15/2023     Overview Note:     G1 - 10lb 13oz,  G4 - 11lb 7oz -- 's without incident    PLAN:  Serial growth in 3rd trimester    24:  EFW 67%, AC 71%, HODA 16.8 cm, vtx  2/15/24:  EFW 86%, AC 82%, HODA 17.3 cm, vtx  3/14/24: EFW 77%, AC 75%, HODA nl, vertex       Assessment & Plan Note:     noted      Mixed incontinence 2023     Overview Note:     noted      Vitamin B12 deficiency 2023      Problem List Items Addressed This Visit              Short interval between pregnancies affecting pregnancy, antepartum - Primary     noted            Other    Multigravida in third trimester

## 2024-03-21 NOTE — PROGRESS NOTES
Patient comes in today for routine prenatal visit. No complaints/concerns today.     Fetal Movement: Yes  Contractions: No  Vaginal Bleeding: No  Leaking Fluid: No  GI/: No    Vitals:    03/21/24 0954   BP: 110/60   Site: Left Upper Arm   Position: Sitting   Weight: 90.3 kg (199 lb)   Height: 1.676 m (5' 6\")

## 2024-03-27 ENCOUNTER — ROUTINE PRENATAL (OUTPATIENT)
Dept: OBGYN CLINIC | Age: 24
End: 2024-03-27
Payer: COMMERCIAL

## 2024-03-27 VITALS
HEIGHT: 66 IN | BODY MASS INDEX: 32.08 KG/M2 | SYSTOLIC BLOOD PRESSURE: 126 MMHG | DIASTOLIC BLOOD PRESSURE: 68 MMHG | WEIGHT: 199.6 LBS

## 2024-03-27 DIAGNOSIS — E53.8 VITAMIN B12 DEFICIENCY: ICD-10-CM

## 2024-03-27 DIAGNOSIS — Z34.83 MULTIGRAVIDA IN THIRD TRIMESTER: Primary | ICD-10-CM

## 2024-03-27 DIAGNOSIS — O09.299 HX OF MACROSOMIA IN INFANT IN PRIOR PREGNANCY, CURRENTLY PREGNANT: ICD-10-CM

## 2024-03-27 DIAGNOSIS — Z34.83 MULTIGRAVIDA IN THIRD TRIMESTER: ICD-10-CM

## 2024-03-27 DIAGNOSIS — R34 DECREASED URINATION: ICD-10-CM

## 2024-03-27 DIAGNOSIS — O09.899 SHORT INTERVAL BETWEEN PREGNANCIES AFFECTING PREGNANCY, ANTEPARTUM: ICD-10-CM

## 2024-03-27 LAB
ALBUMIN SERPL-MCNC: 2.7 G/DL (ref 3.5–5)
ALBUMIN/GLOB SERPL: 0.8 (ref 0.4–1.6)
ALP SERPL-CCNC: 129 U/L (ref 50–136)
ALT SERPL-CCNC: 12 U/L (ref 12–65)
ANION GAP SERPL CALC-SCNC: 7 MMOL/L (ref 2–11)
AST SERPL-CCNC: 16 U/L (ref 15–37)
BILIRUB SERPL-MCNC: 0.7 MG/DL (ref 0.2–1.1)
BILIRUBIN, URINE, POC: NEGATIVE
BLOOD URINE, POC: NEGATIVE
BUN SERPL-MCNC: 8 MG/DL (ref 6–23)
CALCIUM SERPL-MCNC: 8.8 MG/DL (ref 8.3–10.4)
CHLORIDE SERPL-SCNC: 107 MMOL/L (ref 103–113)
CO2 SERPL-SCNC: 23 MMOL/L (ref 21–32)
CREAT SERPL-MCNC: 0.5 MG/DL (ref 0.6–1)
ERYTHROCYTE [DISTWIDTH] IN BLOOD BY AUTOMATED COUNT: 14.6 % (ref 11.9–14.6)
GLOBULIN SER CALC-MCNC: 3.4 G/DL (ref 2.8–4.5)
GLUCOSE SERPL-MCNC: 115 MG/DL (ref 65–100)
GLUCOSE URINE, POC: NEGATIVE
HCT VFR BLD AUTO: 34.8 % (ref 35.8–46.3)
HGB BLD-MCNC: 11.3 G/DL (ref 11.7–15.4)
KETONES, URINE, POC: NEGATIVE
LEUKOCYTE ESTERASE, URINE, POC: NEGATIVE
MCH RBC QN AUTO: 31 PG (ref 26.1–32.9)
MCHC RBC AUTO-ENTMCNC: 32.5 G/DL (ref 31.4–35)
MCV RBC AUTO: 95.3 FL (ref 82–102)
NITRITE, URINE, POC: NEGATIVE
NRBC # BLD: 0 K/UL (ref 0–0.2)
PH, URINE, POC: 7 (ref 4.6–8)
PLATELET # BLD AUTO: 157 K/UL (ref 150–450)
PMV BLD AUTO: 10.7 FL (ref 9.4–12.3)
POTASSIUM SERPL-SCNC: 3.6 MMOL/L (ref 3.5–5.1)
PROT SERPL-MCNC: 6.1 G/DL (ref 6.3–8.2)
PROTEIN,URINE, POC: NORMAL
RBC # BLD AUTO: 3.65 M/UL (ref 4.05–5.2)
SODIUM SERPL-SCNC: 137 MMOL/L (ref 136–146)
SPECIFIC GRAVITY, URINE, POC: 1.03 (ref 1–1.03)
URINALYSIS CLARITY, POC: NORMAL
URINALYSIS COLOR, POC: NORMAL
UROBILINOGEN, POC: NORMAL
WBC # BLD AUTO: 7 K/UL (ref 4.3–11.1)

## 2024-03-27 PROCEDURE — 81001 URINALYSIS AUTO W/SCOPE: CPT | Performed by: NURSE PRACTITIONER

## 2024-03-27 PROCEDURE — 99213 OFFICE O/P EST LOW 20 MIN: CPT | Performed by: NURSE PRACTITIONER

## 2024-03-27 NOTE — PROGRESS NOTES
Patient comes in today for routine prenatal visit.   Patient states that approx 4-5 days ago she noticed that she has not been urinating as much even with having the same amount of water intake as she had been. Patient denies any dysuria.     Chaperone for Intimate Exam     Chaperone was offer accepted as part of the rooming process    Chaperone: Jodi Downs       Fetal Movement: Yes  Contractions: Yes-patient states that two days ago she felt contractions every 5 minutes for 2 hours. Today she feels BH.   Vaginal Bleeding: No  Leaking Fluid: No  GI/: No    Vitals:    03/27/24 1037   BP: 126/68   Site: Right Upper Arm   Position: Sitting   Weight: 90.5 kg (199 lb 9.6 oz)   Height: 1.676 m (5' 6\")

## 2024-03-27 NOTE — PROGRESS NOTES
This is a 23 y.o.   at 38w0d for routine OB visit.    Her Estimated Due Date is 4/10/2024, by Ultrasound    Denies leaking of fluid, vaginal bleeding, or regular contractions. Reports fetal movement.   + BH ctxs    Current Outpatient Medications on File Prior to Visit   Medication Sig Dispense Refill    ondansetron (ZOFRAN-ODT) 4 MG disintegrating tablet TAKE 1 TABLET BY MOUTH 3 TIMES DAILY AS NEEDED FOR NAUSEA OR VOMITING 21 tablet 0    vitamin B-12 (CYANOCOBALAMIN) 500 MCG tablet Take 1 tablet by mouth daily      folic acid (FOLVITE) 400 MCG tablet Take 1 tablet by mouth daily      acetaminophen (TYLENOL) 325 MG tablet Take 2 tablets by mouth every 6 hours as needed for Pain      docusate sodium (COLACE) 100 MG capsule Take 1 capsule by mouth 2 times daily 60 capsule 11    Prenatal MV & Min w/FA-DHA (PRENATAL ADULT GUMMY/DHA/FA PO) Take by mouth       No current facility-administered medications on file prior to visit.       Allergies   Allergen Reactions    Latex Rash    Adhesive Tape Hives     itching       OB History    Para Term  AB Living   5 3 3 0 1 3   SAB IAB Ectopic Molar Multiple Live Births   1 0 0 0 0 3       # 1 - Date: 10/14/17, Sex: Male, Weight: 4.93 kg (10 lb 13.9 oz), GA: 41w0d, Delivery: Vaginal, Spontaneous, Apgar1: 8, Apgar5: 9, Living: Living, Birth Comments: None    # 2 - Date: 19, Sex: Female, Weight: 3.965 kg (8 lb 11.9 oz), GA: 40w3d, Delivery: Vaginal, Spontaneous, Apgar1: 9, Apgar5: 9, Living: Living, Birth Comments: None    # 3 - Date: 2021, Sex: None, Weight: None, GA: None, Delivery: None, Apgar1: None, Apgar5: None, Living: None, Birth Comments: None    # 4 - Date: 23, Sex: Female, Weight: 5.195 kg (11 lb 7.3 oz), GA: 39w1d, Delivery: Vaginal, Spontaneous, Apgar1: 8, Apgar5: 9, Living: Living, Birth Comments: None    # 5 - Date: None, Sex: None, Weight: None, GA: None, Delivery: None, Apgar1: None, Apgar5: None, Living: None, Birth Comments:

## 2024-03-27 NOTE — ASSESSMENT & PLAN NOTE
PTL/labor precautions, FMC, and pregnancy warning signs reviewed. Pt advised to call the office at [phone] or go straight to Labor and Delivery at [facility] with any of the following concerns vaginal bleeding, leaking of fluid, zeeshan regularly Q 5-7 minutes for over an hour or not feeling the baby move.   RTO 1 wk OBV

## 2024-03-28 NOTE — PROGRESS NOTES
I have reviewed the patient's visit today including history, exam and assessment by MARC Ghosh.  I agree with treatment/plan as above.    Kali Fang MD  8:08 AM  03/28/24

## 2024-04-01 ENCOUNTER — PREP FOR PROCEDURE (OUTPATIENT)
Dept: OBGYN CLINIC | Age: 24
End: 2024-04-01

## 2024-04-01 ENCOUNTER — ROUTINE PRENATAL (OUTPATIENT)
Dept: OBGYN CLINIC | Age: 24
End: 2024-04-01
Payer: COMMERCIAL

## 2024-04-01 VITALS
DIASTOLIC BLOOD PRESSURE: 72 MMHG | SYSTOLIC BLOOD PRESSURE: 124 MMHG | BODY MASS INDEX: 31.5 KG/M2 | WEIGHT: 196 LBS | HEIGHT: 66 IN

## 2024-04-01 DIAGNOSIS — Z34.83 MULTIGRAVIDA IN THIRD TRIMESTER: ICD-10-CM

## 2024-04-01 DIAGNOSIS — O09.899 SHORT INTERVAL BETWEEN PREGNANCIES AFFECTING PREGNANCY, ANTEPARTUM: Primary | ICD-10-CM

## 2024-04-01 DIAGNOSIS — O09.299 HX OF MACROSOMIA IN INFANT IN PRIOR PREGNANCY, CURRENTLY PREGNANT: ICD-10-CM

## 2024-04-01 PROBLEM — R34 DECREASED URINATION: Status: RESOLVED | Noted: 2024-03-27 | Resolved: 2024-04-01

## 2024-04-01 PROCEDURE — 99459 PELVIC EXAMINATION: CPT | Performed by: OBSTETRICS & GYNECOLOGY

## 2024-04-01 PROCEDURE — 99213 OFFICE O/P EST LOW 20 MIN: CPT | Performed by: OBSTETRICS & GYNECOLOGY

## 2024-04-01 RX ORDER — SODIUM CHLORIDE 0.9 % (FLUSH) 0.9 %
5-40 SYRINGE (ML) INJECTION EVERY 12 HOURS SCHEDULED
Status: CANCELLED | OUTPATIENT
Start: 2024-04-01

## 2024-04-01 RX ORDER — SODIUM CHLORIDE, SODIUM LACTATE, POTASSIUM CHLORIDE, AND CALCIUM CHLORIDE .6; .31; .03; .02 G/100ML; G/100ML; G/100ML; G/100ML
1000 INJECTION, SOLUTION INTRAVENOUS PRN
Status: CANCELLED | OUTPATIENT
Start: 2024-04-01

## 2024-04-01 RX ORDER — METHYLERGONOVINE MALEATE 0.2 MG/ML
200 INJECTION INTRAVENOUS PRN
Status: CANCELLED | OUTPATIENT
Start: 2024-04-01

## 2024-04-01 RX ORDER — SODIUM CHLORIDE, SODIUM LACTATE, POTASSIUM CHLORIDE, AND CALCIUM CHLORIDE .6; .31; .03; .02 G/100ML; G/100ML; G/100ML; G/100ML
500 INJECTION, SOLUTION INTRAVENOUS PRN
Status: CANCELLED | OUTPATIENT
Start: 2024-04-01

## 2024-04-01 RX ORDER — ONDANSETRON 2 MG/ML
4 INJECTION INTRAMUSCULAR; INTRAVENOUS EVERY 6 HOURS PRN
Status: CANCELLED | OUTPATIENT
Start: 2024-04-01

## 2024-04-01 RX ORDER — SODIUM CHLORIDE 0.9 % (FLUSH) 0.9 %
5-40 SYRINGE (ML) INJECTION PRN
Status: CANCELLED | OUTPATIENT
Start: 2024-04-01

## 2024-04-01 RX ORDER — DEXTROSE, SODIUM CHLORIDE, SODIUM LACTATE, POTASSIUM CHLORIDE, AND CALCIUM CHLORIDE 5; .6; .31; .03; .02 G/100ML; G/100ML; G/100ML; G/100ML; G/100ML
INJECTION, SOLUTION INTRAVENOUS CONTINUOUS
Status: CANCELLED | OUTPATIENT
Start: 2024-04-01

## 2024-04-01 RX ORDER — SODIUM CHLORIDE 9 MG/ML
INJECTION, SOLUTION INTRAVENOUS PRN
Status: CANCELLED | OUTPATIENT
Start: 2024-04-01

## 2024-04-01 RX ORDER — MISOPROSTOL 100 UG/1
400 TABLET ORAL PRN
Status: CANCELLED | OUTPATIENT
Start: 2024-04-01

## 2024-04-01 RX ORDER — ONDANSETRON 4 MG/1
4 TABLET, ORALLY DISINTEGRATING ORAL EVERY 6 HOURS PRN
Status: CANCELLED | OUTPATIENT
Start: 2024-04-01

## 2024-04-01 RX ORDER — TERBUTALINE SULFATE 1 MG/ML
0.25 INJECTION, SOLUTION SUBCUTANEOUS ONCE
Status: CANCELLED | OUTPATIENT
Start: 2024-04-01 | End: 2024-04-01

## 2024-04-01 NOTE — PATIENT INSTRUCTIONS
Please call Labor and Delivery ([phone]) Thursday morning at 5:00 am and they will tell you when to be there.  I will see you later that morning!  If you develop signs and symptoms of labor including but not limited to regular uterine contractions every 5-7 minutes for 1 hour, vaginal bleeding or leakage of fluid please contact our office and/or seek immediate care.  Thanks for coming to see us today and letting us take care of you!

## 2024-04-01 NOTE — ASSESSMENT & PLAN NOTE
Educated patient of signs and symptoms of  labor including but not limited to regular uterine contractions every 5-7 minutes for 1 hour, vaginal bleeding or leakage of fluid to seek immediate care.   D/W pt at length induction vs spontaneous labor risks and benefits including but not limited to: favorable cervix, Hsieh's score, elective/prophylactic vs medical induction, possible increased risk of longer latent stage, possible increased risk of FHT's abnormalities, tachysystole, possible increased risk of , placental abruption, uterine rupture, varying methods of cervical ripening and induction (cytotec, cervical balloon, cervidil and pitocin)  Also D/W that with elective/prophylactic inductions, having her induction postponed for medically indicated inductions is always a possibility. Pt understands, accepts all known and unknown risks and wishes to proceed.  Dr. Patel agrees to IOL this Thursday

## 2024-04-01 NOTE — PROGRESS NOTES
Patient comes in today for routine prenatal visit. Pt states she is having thick discharge that goes through her underwear, color is white.     Fetal Movement: Yes  Contractions: Yes  Vaginal Bleeding: No  Leaking Fluid: No  GI/: Yes pt states she is only peeing a little amount     Vitals:    04/01/24 1302   BP: 124/72   Site: Left Upper Arm   Position: Sitting   Weight: 88.9 kg (196 lb)   Height: 1.676 m (5' 6\")

## 2024-04-01 NOTE — PROGRESS NOTES
Chief Complaint   Patient presents with    Routine Prenatal Visit        This 23 y.o.  at 38w5d with Estimated Date of Delivery: 4/10/24 presents for routine prenatal visit. Patient has no complaints today. Pt reports good FM, no LOF, VB, ctx. Pt denies H/A, vision changes, abdom pain, N/V.    Vitals:    24 1302   BP: 124/72   Site: Left Upper Arm   Position: Sitting   Weight: 88.9 kg (196 lb)   Height: 1.676 m (5' 6\")        Patient Active Problem List    Diagnosis Date Noted    Multigravida in third trimester 08/15/2023     Overview Note:     EDC by 5 6/7 week US (known LMP)    2019: CF/SMA negative (prev pregnancy)  23: NIPT Low risk, male  23:  AFP only neg  2/15/24:  tdap given       Assessment & Plan Note:      Educated patient of signs and symptoms of  labor including but not limited to regular uterine contractions every 5-7 minutes for 1 hour, vaginal bleeding or leakage of fluid to seek immediate care.   D/W pt at length induction vs spontaneous labor risks and benefits including but not limited to: favorable cervix, Hsieh's score, elective/prophylactic vs medical induction, possible increased risk of longer latent stage, possible increased risk of FHT's abnormalities, tachysystole, possible increased risk of , placental abruption, uterine rupture, varying methods of cervical ripening and induction (cytotec, cervical balloon, cervidil and pitocin)  Also D/W that with elective/prophylactic inductions, having her induction postponed for medically indicated inductions is always a possibility. Pt understands, accepts all known and unknown risks and wishes to proceed.  Dr. Patel agrees to IOL this Thursday      Short interval between pregnancies affecting pregnancy, antepartum 08/15/2023     Overview Note:     PLAN:  Serial growth in 3rd trimester    24:  EFW 67%, AC 71%, HODA 16.8 cm, vtx  2/15/24:  EFW 86%, AC 82%, HODA 17.3 cm, vtx  3/14/24: EFW 77%, AC 75%,

## 2024-04-04 ENCOUNTER — ANESTHESIA (OUTPATIENT)
Dept: LABOR AND DELIVERY | Age: 24
End: 2024-04-04
Payer: COMMERCIAL

## 2024-04-04 ENCOUNTER — HOSPITAL ENCOUNTER (INPATIENT)
Age: 24
LOS: 1 days | Discharge: HOME | End: 2024-04-05
Attending: OBSTETRICS & GYNECOLOGY | Admitting: OBSTETRICS & GYNECOLOGY
Payer: COMMERCIAL

## 2024-04-04 ENCOUNTER — ANESTHESIA EVENT (OUTPATIENT)
Dept: LABOR AND DELIVERY | Age: 24
End: 2024-04-04
Payer: COMMERCIAL

## 2024-04-04 DIAGNOSIS — Z34.90 ENCOUNTER FOR ELECTIVE INDUCTION OF LABOR: Primary | ICD-10-CM

## 2024-04-04 LAB
ABO + RH BLD: NORMAL
BASOPHILS # BLD: 0.1 K/UL (ref 0–0.2)
BASOPHILS NFR BLD: 1 % (ref 0–2)
BLOOD GROUP ANTIBODIES SERPL: NORMAL
DIFFERENTIAL METHOD BLD: NORMAL
EOSINOPHIL # BLD: 0.1 K/UL (ref 0–0.8)
EOSINOPHIL NFR BLD: 2 % (ref 0.5–7.8)
ERYTHROCYTE [DISTWIDTH] IN BLOOD BY AUTOMATED COUNT: 14.2 % (ref 11.9–14.6)
HCT VFR BLD AUTO: 37.8 % (ref 35.8–46.3)
HGB BLD-MCNC: 12.6 G/DL (ref 11.7–15.4)
IMM GRANULOCYTES # BLD AUTO: 0 K/UL (ref 0–0.5)
IMM GRANULOCYTES NFR BLD AUTO: 0 % (ref 0–5)
LYMPHOCYTES # BLD: 3 K/UL (ref 0.5–4.6)
LYMPHOCYTES NFR BLD: 42 % (ref 13–44)
MCH RBC QN AUTO: 31.1 PG (ref 26.1–32.9)
MCHC RBC AUTO-ENTMCNC: 33.3 G/DL (ref 31.4–35)
MCV RBC AUTO: 93.3 FL (ref 82–102)
MONOCYTES # BLD: 0.5 K/UL (ref 0.1–1.3)
MONOCYTES NFR BLD: 7 % (ref 4–12)
NEUTS SEG # BLD: 3.5 K/UL (ref 1.7–8.2)
NEUTS SEG NFR BLD: 49 % (ref 43–78)
NRBC # BLD: 0 K/UL (ref 0–0.2)
PLATELET # BLD AUTO: 220 K/UL (ref 150–450)
PMV BLD AUTO: 10.9 FL (ref 9.4–12.3)
RBC # BLD AUTO: 4.05 M/UL (ref 4.05–5.2)
RPR SER QL: NONREACTIVE
SPECIMEN EXP DATE BLD: NORMAL
WBC # BLD AUTO: 7.2 K/UL (ref 4.3–11.1)

## 2024-04-04 PROCEDURE — 86900 BLOOD TYPING SEROLOGIC ABO: CPT

## 2024-04-04 PROCEDURE — 7100000011 HC PHASE II RECOVERY - ADDTL 15 MIN

## 2024-04-04 PROCEDURE — 86850 RBC ANTIBODY SCREEN: CPT

## 2024-04-04 PROCEDURE — 2580000003 HC RX 258: Performed by: OBSTETRICS & GYNECOLOGY

## 2024-04-04 PROCEDURE — 7220000101 HC DELIVERY VAGINAL/SINGLE

## 2024-04-04 PROCEDURE — 1100000000 HC RM PRIVATE

## 2024-04-04 PROCEDURE — 85025 COMPLETE CBC W/AUTO DIFF WBC: CPT

## 2024-04-04 PROCEDURE — 6370000000 HC RX 637 (ALT 250 FOR IP): Performed by: OBSTETRICS & GYNECOLOGY

## 2024-04-04 PROCEDURE — 6360000002 HC RX W HCPCS: Performed by: OBSTETRICS & GYNECOLOGY

## 2024-04-04 PROCEDURE — 86901 BLOOD TYPING SEROLOGIC RH(D): CPT

## 2024-04-04 PROCEDURE — 86592 SYPHILIS TEST NON-TREP QUAL: CPT

## 2024-04-04 PROCEDURE — 59409 OBSTETRICAL CARE: CPT | Performed by: OBSTETRICS & GYNECOLOGY

## 2024-04-04 PROCEDURE — 51701 INSERT BLADDER CATHETER: CPT

## 2024-04-04 PROCEDURE — 3700000025 EPIDURAL BLOCK: Performed by: ANESTHESIOLOGY

## 2024-04-04 PROCEDURE — 7100000010 HC PHASE II RECOVERY - FIRST 15 MIN

## 2024-04-04 PROCEDURE — 4A1HXCZ MONITORING OF PRODUCTS OF CONCEPTION, CARDIAC RATE, EXTERNAL APPROACH: ICD-10-PCS | Performed by: OBSTETRICS & GYNECOLOGY

## 2024-04-04 PROCEDURE — 7210000100 HC LABOR FEE PER 1 HR

## 2024-04-04 RX ORDER — ACETAMINOPHEN 500 MG
1000 TABLET ORAL EVERY 8 HOURS SCHEDULED
Status: DISCONTINUED | OUTPATIENT
Start: 2024-04-04 | End: 2024-04-05 | Stop reason: HOSPADM

## 2024-04-04 RX ORDER — SODIUM CHLORIDE 9 MG/ML
INJECTION, SOLUTION INTRAVENOUS PRN
Status: DISCONTINUED | OUTPATIENT
Start: 2024-04-04 | End: 2024-04-05

## 2024-04-04 RX ORDER — TRANEXAMIC ACID 10 MG/ML
1000 INJECTION, SOLUTION INTRAVENOUS
Status: DISCONTINUED | OUTPATIENT
Start: 2024-04-04 | End: 2024-04-04

## 2024-04-04 RX ORDER — IBUPROFEN 800 MG/1
800 TABLET ORAL EVERY 8 HOURS
Status: DISCONTINUED | OUTPATIENT
Start: 2024-04-04 | End: 2024-04-05 | Stop reason: HOSPADM

## 2024-04-04 RX ORDER — FAMOTIDINE 20 MG/1
20 TABLET, FILM COATED ORAL ONCE
Status: COMPLETED | OUTPATIENT
Start: 2024-04-04 | End: 2024-04-04

## 2024-04-04 RX ORDER — OXYCODONE HYDROCHLORIDE 5 MG/1
5 TABLET ORAL EVERY 4 HOURS PRN
Status: DISCONTINUED | OUTPATIENT
Start: 2024-04-04 | End: 2024-04-05 | Stop reason: HOSPADM

## 2024-04-04 RX ORDER — ONDANSETRON 4 MG/1
4 TABLET, ORALLY DISINTEGRATING ORAL EVERY 6 HOURS PRN
Status: DISCONTINUED | OUTPATIENT
Start: 2024-04-04 | End: 2024-04-04

## 2024-04-04 RX ORDER — LANOLIN
CREAM (ML) TOPICAL PRN
Status: DISCONTINUED | OUTPATIENT
Start: 2024-04-04 | End: 2024-04-05 | Stop reason: HOSPADM

## 2024-04-04 RX ORDER — SODIUM CHLORIDE, SODIUM LACTATE, POTASSIUM CHLORIDE, AND CALCIUM CHLORIDE .6; .31; .03; .02 G/100ML; G/100ML; G/100ML; G/100ML
500 INJECTION, SOLUTION INTRAVENOUS PRN
Status: DISCONTINUED | OUTPATIENT
Start: 2024-04-04 | End: 2024-04-04

## 2024-04-04 RX ORDER — ONDANSETRON 8 MG/1
8 TABLET, ORALLY DISINTEGRATING ORAL EVERY 8 HOURS PRN
Status: DISCONTINUED | OUTPATIENT
Start: 2024-04-04 | End: 2024-04-05 | Stop reason: HOSPADM

## 2024-04-04 RX ORDER — MISOPROSTOL 200 UG/1
200 TABLET ORAL PRN
Status: DISCONTINUED | OUTPATIENT
Start: 2024-04-04 | End: 2024-04-05 | Stop reason: HOSPADM

## 2024-04-04 RX ORDER — TERBUTALINE SULFATE 1 MG/ML
0.25 INJECTION, SOLUTION SUBCUTANEOUS ONCE
Status: DISCONTINUED | OUTPATIENT
Start: 2024-04-04 | End: 2024-04-04

## 2024-04-04 RX ORDER — IBUPROFEN 800 MG/1
800 TABLET ORAL EVERY 8 HOURS SCHEDULED
Status: DISCONTINUED | OUTPATIENT
Start: 2024-04-04 | End: 2024-04-04

## 2024-04-04 RX ORDER — POLYETHYLENE GLYCOL 3350 17 G/17G
17 POWDER, FOR SOLUTION ORAL DAILY
Status: DISCONTINUED | OUTPATIENT
Start: 2024-04-04 | End: 2024-04-05 | Stop reason: HOSPADM

## 2024-04-04 RX ORDER — SODIUM CHLORIDE, SODIUM LACTATE, POTASSIUM CHLORIDE, CALCIUM CHLORIDE 600; 310; 30; 20 MG/100ML; MG/100ML; MG/100ML; MG/100ML
INJECTION, SOLUTION INTRAVENOUS CONTINUOUS
Status: DISCONTINUED | OUTPATIENT
Start: 2024-04-04 | End: 2024-04-05

## 2024-04-04 RX ORDER — SODIUM CHLORIDE, SODIUM LACTATE, POTASSIUM CHLORIDE, AND CALCIUM CHLORIDE .6; .31; .03; .02 G/100ML; G/100ML; G/100ML; G/100ML
1000 INJECTION, SOLUTION INTRAVENOUS PRN
Status: DISCONTINUED | OUTPATIENT
Start: 2024-04-04 | End: 2024-04-04

## 2024-04-04 RX ORDER — SODIUM CHLORIDE 9 MG/ML
INJECTION, SOLUTION INTRAVENOUS PRN
Status: DISCONTINUED | OUTPATIENT
Start: 2024-04-04 | End: 2024-04-04

## 2024-04-04 RX ORDER — METHYLERGONOVINE MALEATE 0.2 MG/ML
200 INJECTION INTRAVENOUS PRN
Status: DISCONTINUED | OUTPATIENT
Start: 2024-04-04 | End: 2024-04-04

## 2024-04-04 RX ORDER — SODIUM CHLORIDE 0.9 % (FLUSH) 0.9 %
5-40 SYRINGE (ML) INJECTION PRN
Status: DISCONTINUED | OUTPATIENT
Start: 2024-04-04 | End: 2024-04-04

## 2024-04-04 RX ORDER — OXYCODONE HYDROCHLORIDE 5 MG/1
10 TABLET ORAL EVERY 4 HOURS PRN
Status: DISCONTINUED | OUTPATIENT
Start: 2024-04-04 | End: 2024-04-04

## 2024-04-04 RX ORDER — SIMETHICONE 80 MG
80 TABLET,CHEWABLE ORAL EVERY 6 HOURS PRN
Status: DISCONTINUED | OUTPATIENT
Start: 2024-04-04 | End: 2024-04-05 | Stop reason: HOSPADM

## 2024-04-04 RX ORDER — SODIUM CHLORIDE 0.9 % (FLUSH) 0.9 %
5-40 SYRINGE (ML) INJECTION EVERY 12 HOURS SCHEDULED
Status: DISCONTINUED | OUTPATIENT
Start: 2024-04-04 | End: 2024-04-04

## 2024-04-04 RX ORDER — FAMOTIDINE 20 MG/1
20 TABLET, FILM COATED ORAL 2 TIMES DAILY PRN
Status: DISCONTINUED | OUTPATIENT
Start: 2024-04-04 | End: 2024-04-05 | Stop reason: HOSPADM

## 2024-04-04 RX ORDER — ONDANSETRON 2 MG/ML
4 INJECTION INTRAMUSCULAR; INTRAVENOUS EVERY 6 HOURS PRN
Status: DISCONTINUED | OUTPATIENT
Start: 2024-04-04 | End: 2024-04-04

## 2024-04-04 RX ORDER — SODIUM CHLORIDE 0.9 % (FLUSH) 0.9 %
5-40 SYRINGE (ML) INJECTION PRN
Status: DISCONTINUED | OUTPATIENT
Start: 2024-04-04 | End: 2024-04-05

## 2024-04-04 RX ORDER — METHYLERGONOVINE MALEATE 0.2 MG/ML
200 INJECTION INTRAVENOUS PRN
Status: DISCONTINUED | OUTPATIENT
Start: 2024-04-04 | End: 2024-04-05 | Stop reason: HOSPADM

## 2024-04-04 RX ORDER — MISOPROSTOL 200 UG/1
400 TABLET ORAL PRN
Status: DISCONTINUED | OUTPATIENT
Start: 2024-04-04 | End: 2024-04-04

## 2024-04-04 RX ORDER — DEXTROSE, SODIUM CHLORIDE, SODIUM LACTATE, POTASSIUM CHLORIDE, AND CALCIUM CHLORIDE 5; .6; .31; .03; .02 G/100ML; G/100ML; G/100ML; G/100ML; G/100ML
INJECTION, SOLUTION INTRAVENOUS CONTINUOUS
Status: DISCONTINUED | OUTPATIENT
Start: 2024-04-04 | End: 2024-04-04

## 2024-04-04 RX ADMIN — Medication 166.7 ML: at 13:58

## 2024-04-04 RX ADMIN — POLYETHYLENE GLYCOL 3350 17 G: 17 POWDER, FOR SOLUTION ORAL at 19:29

## 2024-04-04 RX ADMIN — ACETAMINOPHEN 1000 MG: 500 TABLET ORAL at 14:40

## 2024-04-04 RX ADMIN — FAMOTIDINE 20 MG: 20 TABLET, FILM COATED ORAL at 10:25

## 2024-04-04 RX ADMIN — OXYTOCIN 1 MILLI-UNITS/MIN: 10 INJECTION, SOLUTION INTRAMUSCULAR; INTRAVENOUS at 07:36

## 2024-04-04 RX ADMIN — OXYCODONE 5 MG: 5 TABLET ORAL at 21:26

## 2024-04-04 RX ADMIN — IBUPROFEN 800 MG: 800 TABLET ORAL at 20:00

## 2024-04-04 RX ADMIN — SODIUM CHLORIDE, SODIUM LACTATE, POTASSIUM CHLORIDE, CALCIUM CHLORIDE AND DEXTROSE MONOHYDRATE 125 ML/HR: 5; 600; 310; 30; 20 INJECTION, SOLUTION INTRAVENOUS at 07:12

## 2024-04-04 RX ADMIN — ACETAMINOPHEN 1000 MG: 500 TABLET ORAL at 23:36

## 2024-04-04 ASSESSMENT — PAIN DESCRIPTION - LOCATION: LOCATION: HEAD

## 2024-04-04 ASSESSMENT — PAIN SCALES - GENERAL
PAINLEVEL_OUTOF10: 3
PAINLEVEL_OUTOF10: 3

## 2024-04-04 NOTE — ANESTHESIA PRE PROCEDURE
\"PROTIME\", \"INR\", \"APTT\"    HCG (If Applicable):   Lab Results   Component Value Date    HCGQUANT 13,838 (H) 08/29/2022        ABGs: No results found for: \"PHART\", \"PO2ART\", \"XGF7JJK\", \"TXY6RGI\", \"BEART\", \"G7LRGHLZ\"     Type & Screen (If Applicable):  No results found for: \"LABABO\", \"LABRH\"    Drug/Infectious Status (If Applicable):  Lab Results   Component Value Date/Time    HEPCAB NONREACTIVE 08/15/2023 01:41 PM       COVID-19 Screening (If Applicable): No results found for: \"COVID19\"        Anesthesia Evaluation  Patient summary reviewed  Airway: Mallampati: I  TM distance: >3 FB   Neck ROM: full     Dental: normal exam         Pulmonary:Negative Pulmonary ROS and normal exam  breath sounds clear to auscultation                             Cardiovascular:Negative CV ROS  Exercise tolerance: good (>4 METS)          Rhythm: regular  Rate: normal                    Neuro/Psych:   Negative Neuro/Psych ROS              GI/Hepatic/Renal: Neg GI/Hepatic/Renal ROS            Endo/Other: Negative Endo/Other ROS                    Abdominal:              PE comment: Deferred   Vascular: negative vascular ROS.         Other Findings:             Anesthesia Plan      epidural     ASA 2     (Term IUP requesting labor epidural)        Anesthetic plan and risks discussed with patient and spouse.              Post-op pain plan if not by surgeon: continuous epidural            GLORIA GANNON IV, MD   4/4/2024

## 2024-04-04 NOTE — ANESTHESIA PROCEDURE NOTES
Epidural Block    Patient location during procedure: OB  Start time: 4/4/2024 11:02 AM  End time: 4/4/2024 11:07 AM  Reason for block: labor epidural  Staffing  Performed: anesthesiologist   Anesthesiologist: Tr Mast IV, MD  Performed by: Tr Mast IV, MD  Authorized by: Tr Mast IV, MD    Epidural  Patient position: sitting  Prep: ChloraPrep  Patient monitoring: continuous pulse ox and frequent blood pressure checks  Approach: right paramedian  Location: L1-2  Injection technique: ALEX air  Provider prep: sterile gloves and mask  Needle  Needle type: Tuohy   Needle gauge: 17 G  Needle insertion depth: 4 cm  Catheter type: end hole  Catheter size: 19 G  Catheter at skin depth: 10 cm  Test dose: negativeCatheter Secured: tegaderm and tape (liquid adhesive)  Assessment  Hemodynamics: stable  Attempts: 1  Outcomes: uncomplicated and patient tolerated procedure well  Additional Notes  Risk of bleeding, infection, and accidental dural puncture discussed.    Local 3ml 1% lidocaine at the skin.  Preanesthetic Checklist  Completed: patient identified, IV checked, site marked, risks and benefits discussed, surgical/procedural consents, equipment checked, pre-op evaluation, timeout performed, anesthesia consent given, oxygen available and monitors applied/VS acknowledged

## 2024-04-04 NOTE — L&D DELIVERY NOTE
FAY [facility] [place] Nurse    Tata Espinoza Scrub Tech    Quin Muniz, FAY Charge Nurse              [place] Assessment    Living Status: Living        Skin Color:   Heart Rate:   Reflex Irritability:   Muscle Tone:   Respiratory Effort:   Total:            1 Minute:    1    2    2    2    2    9         5 Minute:    1    2    2    2    2    9                                        Apgars Assigned By: BERNARD CODY RN              Resuscitation    Method: Bulb Suction, Stimulation             [place] Measurements                    of a Viable I on 24  Apgars 9, 9  C/C/+2-->pushed to deliver head  onto intact perineum.  Body followed easily thereafter.   Delayed cord clamping, baby to mom.  Cord clamped/cut.  Cord gases were drawn.  Placenta delivered S/I/3VC.  No lac noted.  FF w/ pit and massage.  QBL per RN.  Mom/baby stable.       Mare Patel, DO

## 2024-04-04 NOTE — PROGRESS NOTES
5/50/-3  AROM with copious, clear fluid   FHTs cat 1   Continue pit  Getting epidural now     Mare Patel, DO

## 2024-04-04 NOTE — H&P
Labor Induction Admission Note    Korina Collins Hsieh  [id number]  Estimated Date of Delivery: 4/10/24     OB History          5    Para   3    Term   3            AB   1    Living   3         SAB   1    IAB        Ectopic        Molar        Multiple   0    Live Births   3                Patient admitted with pregnancy at 39w1d for induction of labor due to EIOL. Prenatal course was uncomplicated besides short IC. Prior infant with macrosomia (proven to 11#7, but normal growth this pregnancy.  Please see prenatal records for details.      Current Facility-Administered Medications   Medication Dose Route Frequency Provider Last Rate Last Admin    dextrose 5 % in lactated ringers infusion   IntraVENous Continuous Kali Fang  mL/hr at 24 0712 125 mL/hr at 24 0712    lactated ringers bolus 500 mL  500 mL IntraVENous PRN Kali Fang MD        Or    lactated ringers bolus 1,000 mL  1,000 mL IntraVENous PRN Kali Fang MD        sodium chloride flush 0.9 % injection 5-40 mL  5-40 mL IntraVENous 2 times per day Kali Fang MD        sodium chloride flush 0.9 % injection 5-40 mL  5-40 mL IntraVENous PRN Kali Fang MD        0.9 % sodium chloride infusion   IntraVENous PRN Kali Fang MD        oxytocin (PITOCIN) 30 units in 500 mL infusion  1-20 siria-units/min IntraVENous Continuous Kali Fang MD        methylergonovine (METHERGINE) injection 200 mcg  200 mcg IntraMUSCular PRN Kali Fang MD        miSOPROStol (CYTOTEC) tablet 400 mcg  400 mcg Buccal PRN Kali Fang MD        tranexamic acid-NaCl IVPB premix 1,000 mg  1,000 mg IntraVENous Once PRN Kali Fang MD        oxytocin (PITOCIN) 30 units in 500 mL infusion  1-20 siria-units/min IntraVENous Continuous PRN Kali Fang MD 1 mL/hr at 24 0736 1 siria-units/min at 24 0736    And    oxytocin (PITOCIN) 10  unit bolus from the bag  10 Units IntraVENous PRN Kali Fang MD        terbutaline (BRETHINE) injection 0.25 mg  0.25 mg SubCUTAneous Once Kali Fang MD        ondansetron (ZOFRAN) injection 4 mg  4 mg IntraVENous Q6H PRN Kali Fang MD        Or    ondansetron (ZOFRAN-ODT) disintegrating tablet 4 mg  4 mg Oral Q6H PRN Kali Fang MD            EXAM: Cervical Exam:  2 cm per RN                Fetal Heart Rate: Cat 1     Labs:   Lab Results   Component Value Date/Time    ABORH O POSITIVE 04/04/2024 07:09 AM    HIVEXT Negative 02/08/2017 12:00 AM          Plan: Admit for induction of labor. Group B Strep negative. Can have epidural when she desires.     Mare Patel DO  April 4, 2024   8:41 AM

## 2024-04-05 VITALS
RESPIRATION RATE: 16 BRPM | DIASTOLIC BLOOD PRESSURE: 73 MMHG | HEART RATE: 67 BPM | SYSTOLIC BLOOD PRESSURE: 110 MMHG | OXYGEN SATURATION: 95 % | TEMPERATURE: 98.2 F

## 2024-04-05 PROCEDURE — 6370000000 HC RX 637 (ALT 250 FOR IP): Performed by: OBSTETRICS & GYNECOLOGY

## 2024-04-05 RX ORDER — IBUPROFEN 800 MG/1
800 TABLET ORAL EVERY 8 HOURS PRN
Qty: 60 TABLET | Refills: 0 | Status: SHIPPED | OUTPATIENT
Start: 2024-04-05

## 2024-04-05 RX ORDER — OXYCODONE HYDROCHLORIDE 5 MG/1
5 TABLET ORAL EVERY 4 HOURS PRN
Qty: 8 TABLET | Refills: 0 | Status: SHIPPED | OUTPATIENT
Start: 2024-04-05 | End: 2024-04-10

## 2024-04-05 RX ADMIN — POLYETHYLENE GLYCOL 3350 17 G: 17 POWDER, FOR SOLUTION ORAL at 08:07

## 2024-04-05 RX ADMIN — ACETAMINOPHEN 1000 MG: 500 TABLET ORAL at 08:07

## 2024-04-05 RX ADMIN — IBUPROFEN 800 MG: 800 TABLET ORAL at 03:49

## 2024-04-05 RX ADMIN — IBUPROFEN 800 MG: 800 TABLET ORAL at 11:12

## 2024-04-05 NOTE — PROGRESS NOTES
Discharge instructions completed.  Patient voiced understanding.  Prescription sent electronically.  Patient discharged home  ambulating.  Wheelchair refused

## 2024-04-05 NOTE — DISCHARGE INSTRUCTIONS
After Your Delivery (the Postpartum Period): Care Instructions  Overview     After childbirth (postpartum period), your body goes through many changes as you recover. In these weeks after delivery, try to take good care of yourself. Get rest whenever you can and accept help from others.  It may take 4 to 6 weeks to feel like yourself again, and possibly longer if you had a  birth. You may feel sore or very tired as you recover. After delivery, you may continue to have contractions as the uterus returns to the size it was before your pregnancy. You will also have some vaginal bleeding. And you may have pain around the vagina as you heal. Several days after delivery you may also have pain and swelling in your breasts as they fill with milk. There are things you can do at home to help ease these discomforts.  After childbirth, it's common to feel emotional. You may feel irritable, cry easily, and feel happy one minute and sad the next. This is called the \"baby blues.\" Hormone changes are one cause of these emotional changes. These feelings usually get better within a couple of weeks. If they don't, talk to your doctor or midwife.  In the first couple of weeks after you give birth, your doctor or midwife may want to check in with you and make a plan for follow-up care. You will likely have a complete postpartum visit in the first 3 months after delivery. At that time, your doctor or midwife will check on your recovery and see how you're doing. But if you have questions or concerns before then, you can always call your doctor or midwife.  Follow-up care is a key part of your treatment and safety. Be sure to make and go to all appointments, and call your doctor if you are having problems. It's also a good idea to know your test results and keep a list of the medicines you take.  How can you care for yourself at home?  Taking care of your body  Use pads instead of tampons for bleeding. After birth, you will

## 2024-04-05 NOTE — CARE COORDINATION
Chart reviewed - 4th baby. Hx of depression noted in chart.     CM met with patient and FOB at bedside to complete initial assessment.    Patient denies ever taking medication for depression and stated it was during her childhood. She denies post-partum depression with previous children.     Patient given informational packet on  mood & anxiety disorders (resources/education).     Family denies any additional needs from case management at this time.  Family has Mother/Infant 's contact information should any needs/questions arise.

## 2024-04-05 NOTE — LACTATION NOTE
This note was copied from a baby's chart.  In to see mom and infant prior to discharge to home later today. Experienced mom stated that infant has been latching and nursing well. Stated that she has no questions or concerns at this time. Mom and infant are following up with [facility] and will see lactation consultant there.

## 2024-04-05 NOTE — ANESTHESIA POSTPROCEDURE EVALUATION
Patient: Korina Hsieh  MRN: [id number]  YOB: 2000  Date of evaluation: 4/4/2024    Procedure Summary       Date: 04/04/24 Room / Location:     Anesthesia Start: 1059 Anesthesia Stop: 1354    Procedure: Labor Analgesia Diagnosis:     Scheduled Providers:  Responsible Provider: Tr Mast IV, MD    Anesthesia Type: epidural ASA Status: 2            Anesthesiology Epidural Followup Note    S/p vaginal delivery via continuous labor epidural.  Patient had adequate pain control during labor and delivery, and she is currently without complaints.  No residual motor or sensory deficit.  No apparent anesthetic complications.

## 2024-04-05 NOTE — PROGRESS NOTES
Progress Note                               Patient: Korina Hsieh MRN: [id number]  SSN: [id number]    YOB: 2000  Age: 23 y.o.  Sex: female      Postpartum Day Number 1    Subjective:     Patient doing well without significant complaints. Ambulating, voiding without difficulty and Patient desires early discharge today. Patient reports normal lochia.. Infant: Breastfeeding and/or pumping    Objective:     Patient Vitals for the past 18 hrs:   Temp Pulse Resp BP SpO2   24 0700 98.2 °F (36.8 °C) 72 14 115/67 94 %   24 0345 98 °F (36.7 °C) 71 16 101/71 --   24 2334 98.4 °F (36.9 °C) 73 18 100/63 96 %   24 1930 97.9 °F (36.6 °C) 70 16 106/77 99 %   24 1758 -- 95 -- (!) 116/55 --        Temp (24hrs), Av.3 °F (36.8 °C), Min:97.9 °F (36.6 °C), Max:98.9 °F (37.2 °C)      Physical Exam:    Patient without distress. Neuro / Psych grossly WNL, A&O X 3    Lab/Data Review:  All lab results for the last 24 hours reviewed.  Blood Type:   Lab Results   Component Value Date/Time    ABORH O POSITIVE 2024 07:09 AM        Assessment and Plan:     Patient appears to be having an uncomplicated postpartum course. Continue routine perineal care and maternal education., Will discharge home today., instructions and precautions reviewed.  Requests a few oxycodon d/t strong afterpains with nursing, advised to continue ibuprofen/tylenol. rx's sent.    Vineet Rossi MD

## 2024-04-08 ENCOUNTER — TELEPHONE (OUTPATIENT)
Dept: PRIMARY CARE CLINIC | Facility: CLINIC | Age: 24
End: 2024-04-08

## 2024-04-25 ENCOUNTER — TELEPHONE (OUTPATIENT)
Dept: PRIMARY CARE CLINIC | Facility: CLINIC | Age: 24
End: 2024-04-25

## 2024-04-25 NOTE — TELEPHONE ENCOUNTER
Patient called wanting same day appt but we are booked up. She said her stomach is upset and that she has diarrhea. I told her we would call her if someone cancelled. Today or tomorrow.

## 2024-05-22 ENCOUNTER — POSTPARTUM VISIT (OUTPATIENT)
Dept: OBGYN CLINIC | Age: 24
End: 2024-05-22
Payer: COMMERCIAL

## 2024-05-22 VITALS
DIASTOLIC BLOOD PRESSURE: 64 MMHG | BODY MASS INDEX: 27.16 KG/M2 | WEIGHT: 169 LBS | HEIGHT: 66 IN | SYSTOLIC BLOOD PRESSURE: 112 MMHG

## 2024-05-22 DIAGNOSIS — Z12.4 PAP SMEAR FOR CERVICAL CANCER SCREENING: ICD-10-CM

## 2024-05-22 DIAGNOSIS — N89.8 VAGINAL DISCHARGE: Primary | ICD-10-CM

## 2024-05-22 DIAGNOSIS — N91.2 AMENORRHEA: ICD-10-CM

## 2024-05-22 DIAGNOSIS — N89.8 VAGINAL DISCHARGE: ICD-10-CM

## 2024-05-22 PROBLEM — O09.299 HX OF MACROSOMIA IN INFANT IN PRIOR PREGNANCY, CURRENTLY PREGNANT: Status: RESOLVED | Noted: 2023-08-15 | Resolved: 2024-05-22

## 2024-05-22 PROBLEM — Z34.83 MULTIGRAVIDA IN THIRD TRIMESTER: Status: RESOLVED | Noted: 2023-08-15 | Resolved: 2024-05-22

## 2024-05-22 PROBLEM — Z34.90 ENCOUNTER FOR ELECTIVE INDUCTION OF LABOR: Status: RESOLVED | Noted: 2024-04-04 | Resolved: 2024-05-22

## 2024-05-22 PROBLEM — O09.899 SHORT INTERVAL BETWEEN PREGNANCIES AFFECTING PREGNANCY, ANTEPARTUM: Status: RESOLVED | Noted: 2023-08-15 | Resolved: 2024-05-22

## 2024-05-22 LAB
HCG, PREGNANCY, URINE, POC: NEGATIVE
VALID INTERNAL CONTROL, POC: NORMAL

## 2024-05-22 PROCEDURE — 81025 URINE PREGNANCY TEST: CPT | Performed by: NURSE PRACTITIONER

## 2024-05-22 NOTE — ASSESSMENT & PLAN NOTE
Pap today  Breastfeeding without difficulty  Birth control - desires Mindi, SHOLA neg. Recommend condom use/abstain until insertion in 10 days  Rto for Liletta insertion 10-14 days  F/u PCP for non GYN concerns

## 2024-05-22 NOTE — PROGRESS NOTES
Patient comes in today for 6 week post partum visit.     Delivery Method: Vaginal     Delivery Date: 04/04/2024    Birth Control: pt would like to discuss IUD, Mindi   Pt has been sexually active and is tracking ovulation.     Breast/Bottle: Breast     Bleeding: none, just has yellow discharge w/ a smell     Baby: Doing well    Bowel/Bladder: No issues    Blues: None    Last pap smear:  12/13/2021 negative     Vitals:    05/22/24 0953   BP: 112/64        Nora Luna MA  10:03 AM  05/22/24

## 2024-05-22 NOTE — PROGRESS NOTES
Chaperone for Intimate Exam     Chaperone was offer accepted as part of the rooming process    Chaperone: Radha Hurtado

## 2024-05-22 NOTE — PROGRESS NOTES
HPI:    Korina Hsieh is a 24 y.o.  who is here for her 6 week postpartum visit.          Date of Delivery: 2024    Feeding: breast    Birth Control: none, would like Liletta inserted. Breastfeeding exclusively. Has been sexually active, last 1 week ago. No condoms    Vaginal Bleeding resolved. Denies abdominal pain. + yellow discharge with mild odor    Denies dysuria, urgency or frequency    Denies constipation or diarrhea    Denies fever, chills, CP, SOB, cough, or leg pain or swelling. + lower leg itching,no rash    Reports good moods              PE:    Constitutional:  well-developed, well-nourished, and in no distress.     Mental: Alert and awake. Oriented to person/place/time. Able to follow commands    Eyes: EOM nl, Sclera nl, Ocular Discharge not visualized    HENT: Normocephalic and atraumatic. Mouth/Throat: Mucous membranes are moist. External Ears: nl    Neck: Normal range of motion. Neck supple. No thyromegaly present.     Pulmonary: Effort normal; No visualized signs of difficulty breathing or respiratory distress;     Breast: deferred, nursing    Abdominal: Soft. There is no tenderness. There is no rebound.     Pelvic Exam:       External: normal female genitalia without lesions or masses       Vagina: normal without lesions or masses, yellow discharge noted      Cervix: normal without lesions or masses       Adnexa: normal bimanual exam without masses or fullness       Uterus: uterus is normal size, shape, consistency and nontender    Musculoskeletal: Normal range of motion. Normal gait with no signs of ataxia     Neurological: No Facial Asymmetry (Cranial nerve 7 motor function); No gaze palsy; normal coordination, muscle strength and tone      Skin: Skin is warm and dry. Skin: No significant exanthematous lesions or discoloration noted on facial skin      Psych: Normal affect. No hallucinations          Past Medical History:   Diagnosis Date    Anemia affecting pregnancy in third

## 2024-05-23 NOTE — PROGRESS NOTES
I have reviewed the patient's visit today including history, exam and assessment by MARC Ghosh.  I agree with treatment/plan as above.    Kali Fang MD  8:05 AM  05/23/24

## 2024-05-24 LAB
A VAGINAE DNA VAG QL NAA+PROBE: ABNORMAL SCORE
BVAB2 DNA VAG QL NAA+PROBE: ABNORMAL SCORE
C ALBICANS DNA VAG QL NAA+PROBE: NEGATIVE
C GLABRATA DNA VAG QL NAA+PROBE: NEGATIVE
C TRACH RRNA SPEC QL NAA+PROBE: NEGATIVE
MEGA1 DNA VAG QL NAA+PROBE: ABNORMAL SCORE
N GONORRHOEA RRNA SPEC QL NAA+PROBE: NEGATIVE
SPECIMEN SOURCE: ABNORMAL
T VAGINALIS RRNA SPEC QL NAA+PROBE: NEGATIVE

## 2024-05-27 ENCOUNTER — TELEPHONE (OUTPATIENT)
Dept: OBGYN CLINIC | Age: 24
End: 2024-05-27

## 2024-05-27 NOTE — TELEPHONE ENCOUNTER
Patient tested indeterminate for Bacterial Vaginosis. If having symptoms may have one of the following for treatment if not allergic    Flagyl 500mg PO BID for 7 days, #14, No Refills   OR    Metrogel Insert 5 grams nightly for 5 nights, #5, No Refills

## 2024-05-28 NOTE — TELEPHONE ENCOUNTER
Called pt, message below reviewed with pt, pt stated she saw the results over the weekend and went to a [facility] and got the Metrogel since her symptoms were getting worse. Informed pt to let us know if she has any issues. Pt voiced understanding.

## 2024-05-30 LAB
COLLECTION METHOD: NORMAL
CYTOLOGIST CVX/VAG CYTO: NORMAL
CYTOLOGY CVX/VAG DOC THIN PREP: NORMAL
HPV REFLEX: NORMAL
Lab: NORMAL
PAP SOURCE: NORMAL
PATH REPORT.FINAL DX SPEC: NORMAL
STAT OF ADQ CVX/VAG CYTO-IMP: NORMAL

## 2024-06-03 ENCOUNTER — OFFICE VISIT (OUTPATIENT)
Dept: PRIMARY CARE CLINIC | Facility: CLINIC | Age: 24
End: 2024-06-03
Payer: COMMERCIAL

## 2024-06-03 VITALS
SYSTOLIC BLOOD PRESSURE: 100 MMHG | BODY MASS INDEX: 26.78 KG/M2 | WEIGHT: 166.6 LBS | HEIGHT: 66 IN | OXYGEN SATURATION: 98 % | DIASTOLIC BLOOD PRESSURE: 78 MMHG | HEART RATE: 68 BPM

## 2024-06-03 DIAGNOSIS — Z71.89 ACP (ADVANCE CARE PLANNING): ICD-10-CM

## 2024-06-03 DIAGNOSIS — N89.8 VAGINAL DISCHARGE: ICD-10-CM

## 2024-06-03 DIAGNOSIS — Z13.0 SCREENING, ANEMIA, DEFICIENCY, IRON: ICD-10-CM

## 2024-06-03 DIAGNOSIS — Z13.1 SCREENING FOR DIABETES MELLITUS: ICD-10-CM

## 2024-06-03 DIAGNOSIS — E53.8 FOLATE DEFICIENCY: ICD-10-CM

## 2024-06-03 DIAGNOSIS — E53.8 VITAMIN B12 DEFICIENCY: ICD-10-CM

## 2024-06-03 DIAGNOSIS — J06.9 VIRAL UPPER RESPIRATORY TRACT INFECTION: ICD-10-CM

## 2024-06-03 DIAGNOSIS — Z00.00 ENCOUNTER FOR WELL ADULT EXAM WITHOUT ABNORMAL FINDINGS: Primary | ICD-10-CM

## 2024-06-03 DIAGNOSIS — Z13.220 SCREENING FOR LIPID DISORDERS: ICD-10-CM

## 2024-06-03 DIAGNOSIS — Z13.29 SCREENING FOR THYROID DISORDER: ICD-10-CM

## 2024-06-03 PROBLEM — Z30.430 ENCOUNTER FOR IUD INSERTION: Status: ACTIVE | Noted: 2024-06-03

## 2024-06-03 PROBLEM — T74.91XA DOMESTIC VIOLENCE OF ADULT: Status: ACTIVE | Noted: 2021-04-14

## 2024-06-03 PROBLEM — A63.0 CONDYLOMA ACUMINATA: Status: ACTIVE | Noted: 2017-08-01

## 2024-06-03 PROCEDURE — 99395 PREV VISIT EST AGE 18-39: CPT | Performed by: FAMILY MEDICINE

## 2024-06-03 PROCEDURE — 99213 OFFICE O/P EST LOW 20 MIN: CPT | Performed by: FAMILY MEDICINE

## 2024-06-03 RX ORDER — FLUTICASONE PROPIONATE 50 MCG
2 SPRAY, SUSPENSION (ML) NASAL DAILY
Qty: 16 G | Refills: 0 | Status: SHIPPED | OUTPATIENT
Start: 2024-06-03

## 2024-06-03 SDOH — ECONOMIC STABILITY: FOOD INSECURITY: WITHIN THE PAST 12 MONTHS, YOU WORRIED THAT YOUR FOOD WOULD RUN OUT BEFORE YOU GOT MONEY TO BUY MORE.: NEVER TRUE

## 2024-06-03 SDOH — ECONOMIC STABILITY: FOOD INSECURITY: WITHIN THE PAST 12 MONTHS, THE FOOD YOU BOUGHT JUST DIDN'T LAST AND YOU DIDN'T HAVE MONEY TO GET MORE.: NEVER TRUE

## 2024-06-03 SDOH — ECONOMIC STABILITY: INCOME INSECURITY: HOW HARD IS IT FOR YOU TO PAY FOR THE VERY BASICS LIKE FOOD, HOUSING, MEDICAL CARE, AND HEATING?: NOT HARD AT ALL

## 2024-06-03 ASSESSMENT — ENCOUNTER SYMPTOMS
NAUSEA: 0
SORE THROAT: 1
VOMITING: 0
DIARRHEA: 0
SHORTNESS OF BREATH: 0
COUGH: 0
ABDOMINAL PAIN: 0

## 2024-06-03 ASSESSMENT — PATIENT HEALTH QUESTIONNAIRE - PHQ9
2. FEELING DOWN, DEPRESSED OR HOPELESS: NOT AT ALL
SUM OF ALL RESPONSES TO PHQ QUESTIONS 1-9: 0
SUM OF ALL RESPONSES TO PHQ9 QUESTIONS 1 & 2: 0
SUM OF ALL RESPONSES TO PHQ QUESTIONS 1-9: 0
1. LITTLE INTEREST OR PLEASURE IN DOING THINGS: NOT AT ALL
SUM OF ALL RESPONSES TO PHQ QUESTIONS 1-9: 0
SUM OF ALL RESPONSES TO PHQ QUESTIONS 1-9: 0

## 2024-06-03 NOTE — ASSESSMENT & PLAN NOTE
Mild, no signs of bacterial infection.  Will treat with Flonase, discussed aiming backward in her nostrils toward her ears to help with drainage of fluid from behind her ears.

## 2024-06-04 DIAGNOSIS — E53.8 FOLATE DEFICIENCY: ICD-10-CM

## 2024-06-04 DIAGNOSIS — Z13.1 SCREENING FOR DIABETES MELLITUS: ICD-10-CM

## 2024-06-04 DIAGNOSIS — Z13.0 SCREENING, ANEMIA, DEFICIENCY, IRON: ICD-10-CM

## 2024-06-04 DIAGNOSIS — Z13.220 SCREENING FOR LIPID DISORDERS: ICD-10-CM

## 2024-06-04 DIAGNOSIS — Z13.29 SCREENING FOR THYROID DISORDER: ICD-10-CM

## 2024-06-04 DIAGNOSIS — E53.8 VITAMIN B12 DEFICIENCY: ICD-10-CM

## 2024-06-04 LAB
ALBUMIN SERPL-MCNC: 4.1 G/DL (ref 3.5–5)
ALBUMIN/GLOB SERPL: 1.5 (ref 1–1.9)
ALP SERPL-CCNC: 93 U/L (ref 35–104)
ALT SERPL-CCNC: 51 U/L (ref 12–65)
ANION GAP SERPL CALC-SCNC: 10 MMOL/L (ref 9–18)
AST SERPL-CCNC: 31 U/L (ref 15–37)
BASOPHILS # BLD: 0 K/UL (ref 0–0.2)
BASOPHILS NFR BLD: 1 % (ref 0–2)
BILIRUB SERPL-MCNC: 0.5 MG/DL (ref 0–1.2)
BUN SERPL-MCNC: 14 MG/DL (ref 6–23)
CALCIUM SERPL-MCNC: 9.7 MG/DL (ref 8.8–10.2)
CHLORIDE SERPL-SCNC: 108 MMOL/L (ref 98–107)
CHOLEST SERPL-MCNC: 123 MG/DL (ref 0–200)
CO2 SERPL-SCNC: 26 MMOL/L (ref 20–28)
CREAT SERPL-MCNC: 0.67 MG/DL (ref 0.6–1.1)
DIFFERENTIAL METHOD BLD: ABNORMAL
EOSINOPHIL # BLD: 0.3 K/UL (ref 0–0.8)
EOSINOPHIL NFR BLD: 6 % (ref 0.5–7.8)
ERYTHROCYTE [DISTWIDTH] IN BLOOD BY AUTOMATED COUNT: 12.3 % (ref 11.9–14.6)
FOLATE SERPL-MCNC: 11.3 NG/ML (ref 3.1–17.5)
GLOBULIN SER CALC-MCNC: 2.8 G/DL (ref 2.3–3.5)
GLUCOSE SERPL-MCNC: 82 MG/DL (ref 70–99)
HCT VFR BLD AUTO: 38.2 % (ref 35.8–46.3)
HDLC SERPL-MCNC: 40 MG/DL (ref 40–60)
HDLC SERPL: 3.1 (ref 0–5)
HGB BLD-MCNC: 12.7 G/DL (ref 11.7–15.4)
IMM GRANULOCYTES # BLD AUTO: 0 K/UL (ref 0–0.5)
IMM GRANULOCYTES NFR BLD AUTO: 0 % (ref 0–5)
LDLC SERPL CALC-MCNC: 75 MG/DL (ref 0–100)
LYMPHOCYTES # BLD: 2.3 K/UL (ref 0.5–4.6)
LYMPHOCYTES NFR BLD: 45 % (ref 13–44)
MCH RBC QN AUTO: 29.7 PG (ref 26.1–32.9)
MCHC RBC AUTO-ENTMCNC: 33.2 G/DL (ref 31.4–35)
MCV RBC AUTO: 89.5 FL (ref 82–102)
MONOCYTES # BLD: 0.3 K/UL (ref 0.1–1.3)
MONOCYTES NFR BLD: 6 % (ref 4–12)
NEUTS SEG # BLD: 2.1 K/UL (ref 1.7–8.2)
NEUTS SEG NFR BLD: 42 % (ref 43–78)
NRBC # BLD: 0 K/UL (ref 0–0.2)
PLATELET # BLD AUTO: 189 K/UL (ref 150–450)
PMV BLD AUTO: 10.7 FL (ref 9.4–12.3)
POTASSIUM SERPL-SCNC: 4.2 MMOL/L (ref 3.5–5.1)
PROT SERPL-MCNC: 6.9 G/DL (ref 6.3–8.2)
RBC # BLD AUTO: 4.27 M/UL (ref 4.05–5.2)
SODIUM SERPL-SCNC: 143 MMOL/L (ref 136–145)
TRIGL SERPL-MCNC: 38 MG/DL (ref 0–150)
TSH, 3RD GENERATION: 1.41 UIU/ML (ref 0.27–4.2)
VIT B12 SERPL-MCNC: 269 PG/ML (ref 193–986)
VLDLC SERPL CALC-MCNC: 8 MG/DL (ref 6–23)
WBC # BLD AUTO: 5 K/UL (ref 4.3–11.1)

## 2024-06-25 ENCOUNTER — OFFICE VISIT (OUTPATIENT)
Dept: OBGYN CLINIC | Age: 24
End: 2024-06-25
Payer: COMMERCIAL

## 2024-06-25 VITALS
BODY MASS INDEX: 26.26 KG/M2 | WEIGHT: 163.4 LBS | DIASTOLIC BLOOD PRESSURE: 76 MMHG | SYSTOLIC BLOOD PRESSURE: 118 MMHG | HEIGHT: 66 IN

## 2024-06-25 DIAGNOSIS — N92.6 IRREGULAR MENSES: Primary | ICD-10-CM

## 2024-06-25 DIAGNOSIS — Z30.431 INTRAUTERINE DEVICE SURVEILLANCE: ICD-10-CM

## 2024-06-25 DIAGNOSIS — N89.8 VAGINAL DISCHARGE: ICD-10-CM

## 2024-06-25 LAB
HCG, PREGNANCY, URINE, POC: NEGATIVE
VALID INTERNAL CONTROL, POC: YES

## 2024-06-25 PROCEDURE — 99213 OFFICE O/P EST LOW 20 MIN: CPT | Performed by: NURSE PRACTITIONER

## 2024-06-25 PROCEDURE — 81025 URINE PREGNANCY TEST: CPT | Performed by: NURSE PRACTITIONER

## 2024-06-25 PROCEDURE — 99459 PELVIC EXAMINATION: CPT | Performed by: NURSE PRACTITIONER

## 2024-06-25 NOTE — PROGRESS NOTES
Patient here for string check.   Patient states she did a home UPT last week and was unsure if it was faint positive, patient requests UPT today. Has had irregular spotting since IUD insertion and is still breastfeeding.   Patient states she resumed intercourse 3 days after insertion.     LAST PAP:  5/22/2024, neg.     LAST MAMMO:  never     LMP:  No LMP recorded. (Menstrual status: IUD).    BIRTH CONTROL:  IUD    TOBACCO USE:  No    FAMILY HISTORY OF:   Breast Cancer:  No   Ovarian Cancer:  No   Uterine Cancer:  No   Colon Cancer:  No    Vitals:    06/25/24 1108   BP: 118/76   Site: Right Upper Arm   Position: Sitting   Weight: 74.1 kg (163 lb 6.4 oz)   Height: 1.676 m (5' 6\")        ELI BELTRAN RN  06/25/24  11:15 AM

## 2024-06-25 NOTE — PROGRESS NOTES
I have reviewed the patient's visit today including history, exam and assessment by MARC Ghosh.  I agree with treatment/plan as above.    Kali Fang MD  11:45 AM  06/25/24

## 2024-06-27 ENCOUNTER — TELEPHONE (OUTPATIENT)
Dept: OBGYN CLINIC | Age: 24
End: 2024-06-27

## 2024-06-27 DIAGNOSIS — B96.89 BACTERIAL VAGINOSIS: Primary | ICD-10-CM

## 2024-06-27 DIAGNOSIS — N76.0 BACTERIAL VAGINOSIS: Primary | ICD-10-CM

## 2024-06-28 ENCOUNTER — APPOINTMENT (OUTPATIENT)
Dept: CT IMAGING | Age: 24
End: 2024-06-28
Payer: COMMERCIAL

## 2024-06-28 ENCOUNTER — HOSPITAL ENCOUNTER (EMERGENCY)
Age: 24
Discharge: HOME | End: 2024-06-28
Payer: COMMERCIAL

## 2024-06-28 ENCOUNTER — APPOINTMENT (OUTPATIENT)
Dept: ULTRASOUND IMAGING | Age: 24
End: 2024-06-28
Payer: COMMERCIAL

## 2024-06-28 VITALS
HEIGHT: 66 IN | BODY MASS INDEX: 25.71 KG/M2 | TEMPERATURE: 97.7 F | OXYGEN SATURATION: 99 % | SYSTOLIC BLOOD PRESSURE: 112 MMHG | WEIGHT: 160 LBS | HEART RATE: 73 BPM | RESPIRATION RATE: 18 BRPM | DIASTOLIC BLOOD PRESSURE: 78 MMHG

## 2024-06-28 DIAGNOSIS — R10.84 GENERALIZED ABDOMINAL PAIN: Primary | ICD-10-CM

## 2024-06-28 LAB
ALBUMIN SERPL-MCNC: 4.1 G/DL (ref 3.5–5)
ALBUMIN/GLOB SERPL: 1.4 (ref 1–1.9)
ALP SERPL-CCNC: 105 U/L (ref 35–104)
ALT SERPL-CCNC: 35 U/L (ref 12–65)
ANION GAP SERPL CALC-SCNC: 10 MMOL/L (ref 9–18)
APPEARANCE UR: NORMAL
AST SERPL-CCNC: 32 U/L (ref 15–37)
BASOPHILS # BLD: 0.1 K/UL (ref 0–0.2)
BASOPHILS NFR BLD: 1 % (ref 0–2)
BILIRUB SERPL-MCNC: 0.4 MG/DL (ref 0–1.2)
BILIRUB UR QL: NEGATIVE
BUN SERPL-MCNC: 16 MG/DL (ref 6–23)
CALCIUM SERPL-MCNC: 9.6 MG/DL (ref 8.8–10.2)
CHLORIDE SERPL-SCNC: 106 MMOL/L (ref 98–107)
CO2 SERPL-SCNC: 25 MMOL/L (ref 20–28)
COLOR UR: NORMAL
CREAT SERPL-MCNC: 0.86 MG/DL (ref 0.6–1.1)
DIFFERENTIAL METHOD BLD: ABNORMAL
EOSINOPHIL # BLD: 0.4 K/UL (ref 0–0.8)
EOSINOPHIL NFR BLD: 5 % (ref 0.5–7.8)
ERYTHROCYTE [DISTWIDTH] IN BLOOD BY AUTOMATED COUNT: 13.2 % (ref 11.9–14.6)
GLOBULIN SER CALC-MCNC: 2.9 G/DL (ref 2.3–3.5)
GLUCOSE SERPL-MCNC: 92 MG/DL (ref 70–99)
GLUCOSE UR STRIP.AUTO-MCNC: NEGATIVE MG/DL
HCG UR QL: NEGATIVE
HCT VFR BLD AUTO: 39.3 % (ref 35.8–46.3)
HGB BLD-MCNC: 12.9 G/DL (ref 11.7–15.4)
HGB UR QL STRIP: NEGATIVE
IMM GRANULOCYTES # BLD AUTO: 0 K/UL (ref 0–0.5)
IMM GRANULOCYTES NFR BLD AUTO: 0 % (ref 0–5)
KETONES UR QL STRIP.AUTO: NEGATIVE MG/DL
LEUKOCYTE ESTERASE UR QL STRIP.AUTO: NEGATIVE
LIPASE SERPL-CCNC: 26 U/L (ref 13–60)
LYMPHOCYTES # BLD: 3.3 K/UL (ref 0.5–4.6)
LYMPHOCYTES NFR BLD: 47 % (ref 13–44)
MCH RBC QN AUTO: 29.3 PG (ref 26.1–32.9)
MCHC RBC AUTO-ENTMCNC: 32.8 G/DL (ref 31.4–35)
MCV RBC AUTO: 89.1 FL (ref 82–102)
MONOCYTES # BLD: 0.3 K/UL (ref 0.1–1.3)
MONOCYTES NFR BLD: 5 % (ref 4–12)
NEUTS SEG # BLD: 3 K/UL (ref 1.7–8.2)
NEUTS SEG NFR BLD: 42 % (ref 43–78)
NITRITE UR QL STRIP.AUTO: NEGATIVE
NRBC # BLD: 0 K/UL (ref 0–0.2)
PH UR STRIP: 8 (ref 5–9)
PLATELET # BLD AUTO: 194 K/UL (ref 150–450)
PMV BLD AUTO: 10.2 FL (ref 9.4–12.3)
POTASSIUM SERPL-SCNC: 4.3 MMOL/L (ref 3.5–5.1)
PROT SERPL-MCNC: 7 G/DL (ref 6.3–8.2)
PROT UR STRIP-MCNC: NEGATIVE MG/DL
RBC # BLD AUTO: 4.41 M/UL (ref 4.05–5.2)
SODIUM SERPL-SCNC: 141 MMOL/L (ref 136–145)
SP GR UR REFRACTOMETRY: 1.02 (ref 1–1.02)
TROPONIN T SERPL HS-MCNC: <6 NG/L (ref 0–14)
UROBILINOGEN UR QL STRIP.AUTO: 1 EU/DL (ref 0.2–1)
WBC # BLD AUTO: 7 K/UL (ref 4.3–11.1)

## 2024-06-28 PROCEDURE — 99285 EMERGENCY DEPT VISIT HI MDM: CPT

## 2024-06-28 PROCEDURE — 2500000003 HC RX 250 WO HCPCS: Performed by: STUDENT IN AN ORGANIZED HEALTH CARE EDUCATION/TRAINING PROGRAM

## 2024-06-28 PROCEDURE — 85025 COMPLETE CBC W/AUTO DIFF WBC: CPT

## 2024-06-28 PROCEDURE — 96374 THER/PROPH/DIAG INJ IV PUSH: CPT

## 2024-06-28 PROCEDURE — 81025 URINE PREGNANCY TEST: CPT

## 2024-06-28 PROCEDURE — 76705 ECHO EXAM OF ABDOMEN: CPT

## 2024-06-28 PROCEDURE — 81003 URINALYSIS AUTO W/O SCOPE: CPT

## 2024-06-28 PROCEDURE — 84484 ASSAY OF TROPONIN QUANT: CPT

## 2024-06-28 PROCEDURE — 74177 CT ABD & PELVIS W/CONTRAST: CPT

## 2024-06-28 PROCEDURE — 83690 ASSAY OF LIPASE: CPT

## 2024-06-28 PROCEDURE — 96375 TX/PRO/DX INJ NEW DRUG ADDON: CPT

## 2024-06-28 PROCEDURE — 2580000003 HC RX 258: Performed by: STUDENT IN AN ORGANIZED HEALTH CARE EDUCATION/TRAINING PROGRAM

## 2024-06-28 PROCEDURE — 6360000004 HC RX CONTRAST MEDICATION: Performed by: STUDENT IN AN ORGANIZED HEALTH CARE EDUCATION/TRAINING PROGRAM

## 2024-06-28 PROCEDURE — 80053 COMPREHEN METABOLIC PANEL: CPT

## 2024-06-28 PROCEDURE — 6360000002 HC RX W HCPCS: Performed by: STUDENT IN AN ORGANIZED HEALTH CARE EDUCATION/TRAINING PROGRAM

## 2024-06-28 RX ORDER — ONDANSETRON 2 MG/ML
4 INJECTION INTRAMUSCULAR; INTRAVENOUS
Status: COMPLETED | OUTPATIENT
Start: 2024-06-28 | End: 2024-06-28

## 2024-06-28 RX ORDER — METRONIDAZOLE 7.5 MG/G
GEL VAGINAL
Qty: 70 G | Refills: 0 | Status: SHIPPED | OUTPATIENT
Start: 2024-06-28

## 2024-06-28 RX ORDER — MORPHINE SULFATE 2 MG/ML
2 INJECTION, SOLUTION INTRAMUSCULAR; INTRAVENOUS
Status: COMPLETED | OUTPATIENT
Start: 2024-06-28 | End: 2024-06-28

## 2024-06-28 RX ADMIN — ONDANSETRON 4 MG: 2 INJECTION INTRAMUSCULAR; INTRAVENOUS at 18:11

## 2024-06-28 RX ADMIN — FAMOTIDINE 20 MG: 10 INJECTION, SOLUTION INTRAVENOUS at 18:11

## 2024-06-28 RX ADMIN — MORPHINE SULFATE 2 MG: 2 INJECTION, SOLUTION INTRAMUSCULAR; INTRAVENOUS at 18:11

## 2024-06-28 RX ADMIN — IOPAMIDOL 100 ML: 755 INJECTION, SOLUTION INTRAVENOUS at 21:45

## 2024-06-28 ASSESSMENT — LIFESTYLE VARIABLES
HOW MANY STANDARD DRINKS CONTAINING ALCOHOL DO YOU HAVE ON A TYPICAL DAY: PATIENT DOES NOT DRINK
HOW OFTEN DO YOU HAVE A DRINK CONTAINING ALCOHOL: NEVER

## 2024-06-28 ASSESSMENT — PAIN DESCRIPTION - LOCATION
LOCATION: ABDOMEN
LOCATION: ABDOMEN

## 2024-06-28 ASSESSMENT — ENCOUNTER SYMPTOMS
COUGH: 0
PHOTOPHOBIA: 0
VOMITING: 0
SHORTNESS OF BREATH: 0
NAUSEA: 1
ABDOMINAL PAIN: 1
FACIAL SWELLING: 0
TROUBLE SWALLOWING: 0
BACK PAIN: 1

## 2024-06-28 ASSESSMENT — PAIN SCALES - GENERAL
PAINLEVEL_OUTOF10: 8
PAINLEVEL_OUTOF10: 6

## 2024-06-28 ASSESSMENT — PAIN DESCRIPTION - ORIENTATION: ORIENTATION: RIGHT;MID;UPPER

## 2024-06-28 ASSESSMENT — PAIN - FUNCTIONAL ASSESSMENT: PAIN_FUNCTIONAL_ASSESSMENT: 0-10

## 2024-06-28 ASSESSMENT — PAIN DESCRIPTION - DESCRIPTORS: DESCRIPTORS: ACHING

## 2024-06-28 NOTE — ED TRIAGE NOTES
Patient states 3 months post delivery. States epigastic abdominal pain that started at 11. Patient states of mid thoracic back pain. Denies gu complications. States of nausea denies v/d/constipation. Denies fever/chills.

## 2024-06-28 NOTE — TELEPHONE ENCOUNTER
Called patient, updated her on positive BV from swab.   Patient is still breastfeeding-metrogel sent in for patient.     Patient knows to call our office if her s/s persist after finishing RX.

## 2024-06-28 NOTE — ED NOTES
Charge nurse spoke with lactation and was cleared for administration of meds ordered while in the ER while breastfeeding.

## 2024-06-28 NOTE — ED PROVIDER NOTES
([id number]) 76 . Contrast Volume: 100 mL      Report signed on 06/28/2024 (22:38 Eastern Time)   Signed by: Aung Valente M.D.   Reading Location: 331      US ABDOMEN LIMITED Specify organ? GALLBLADDER, PANCREAS, LIVER   Final Result   Normal right upper quadrant ultrasound.            Report signed on 06/28/2024 (20:19 Eastern Time)   Signed by: Dell Gutierrez M.D.   Reading Location: 375                   No results for input(s): \"COVID19\" in the last 72 hours.    Voice dictation software was used during the making of this note.  This software is not perfect and grammatical and other typographical errors may be present.  This note has not been completely proofread for errors.     Pietro Wong PA  06/28/24 7737

## 2024-06-29 NOTE — DISCHARGE INSTRUCTIONS
Your workup here was reassuring overall without clear cause of your pain.  No emergency diagnosis was found.  Follow-up with your primary care provider.  Return for new or worsening symptoms.    As we discussed, I did not find a life threatening cause of your symptoms today. However, THAT DOES NOT MEAN IT COULD NOT DEVELOP. If you develop ANY new or worsening symptoms, it is critical that you return for re-evaluation. This includes any symptoms that are concerning to you, especially symptoms such as new or changed abdominal pain, vomiting, fevers.  If you do not return for re-evaluation, you risk serious complications, including death.

## 2024-07-18 ENCOUNTER — OFFICE VISIT (OUTPATIENT)
Dept: PRIMARY CARE CLINIC | Facility: CLINIC | Age: 24
End: 2024-07-18
Payer: COMMERCIAL

## 2024-07-18 VITALS
OXYGEN SATURATION: 96 % | DIASTOLIC BLOOD PRESSURE: 79 MMHG | HEART RATE: 80 BPM | TEMPERATURE: 98.2 F | BODY MASS INDEX: 22.82 KG/M2 | SYSTOLIC BLOOD PRESSURE: 118 MMHG | WEIGHT: 142 LBS | HEIGHT: 66 IN

## 2024-07-18 DIAGNOSIS — R53.83 MALAISE AND FATIGUE: ICD-10-CM

## 2024-07-18 DIAGNOSIS — J02.9 SORE THROAT: ICD-10-CM

## 2024-07-18 DIAGNOSIS — H66.42: Primary | ICD-10-CM

## 2024-07-18 DIAGNOSIS — N93.9 SCANT VAGINAL BLEEDING: ICD-10-CM

## 2024-07-18 DIAGNOSIS — R53.81 MALAISE AND FATIGUE: ICD-10-CM

## 2024-07-18 LAB
EXP DATE SOLUTION: NORMAL
EXP DATE SWAB: NORMAL
EXPIRATION DATE: NORMAL
GROUP A STREP ANTIGEN, POC: NEGATIVE
LOT NUMBER POC: NORMAL
LOT NUMBER SOLUTION: NORMAL
LOT NUMBER SWAB: NORMAL
QUICKVUE INFLUENZA TEST: NEGATIVE
RSV, POC: NEGATIVE
SARS-COV-2 RNA, POC: NEGATIVE
VALID INTERNAL CONTROL, POC: YES
VALID INTERNAL CONTROL, POC: YES
VALID INTERNAL CONTROL: YES

## 2024-07-18 PROCEDURE — 87804 INFLUENZA ASSAY W/OPTIC: CPT

## 2024-07-18 PROCEDURE — 87420 RESP SYNCYTIAL VIRUS AG IA: CPT

## 2024-07-18 PROCEDURE — 99214 OFFICE O/P EST MOD 30 MIN: CPT

## 2024-07-18 PROCEDURE — 87635 SARS-COV-2 COVID-19 AMP PRB: CPT

## 2024-07-18 PROCEDURE — 87880 STREP A ASSAY W/OPTIC: CPT

## 2024-07-18 RX ORDER — AMOXICILLIN AND CLAVULANATE POTASSIUM 875; 125 MG/1; MG/1
1 TABLET, FILM COATED ORAL 2 TIMES DAILY
Qty: 10 TABLET | Refills: 0 | Status: SHIPPED | OUTPATIENT
Start: 2024-07-18 | End: 2024-07-23

## 2024-07-18 RX ORDER — FLUCONAZOLE 150 MG/1
150 TABLET ORAL ONCE
Qty: 1 TABLET | Refills: 0 | Status: SHIPPED | OUTPATIENT
Start: 2024-07-18 | End: 2024-07-18

## 2024-07-18 ASSESSMENT — ENCOUNTER SYMPTOMS
BLOOD IN STOOL: 0
SHORTNESS OF BREATH: 0
COUGH: 0
SINUS PAIN: 0
EYE PAIN: 1
VOMITING: 0
SORE THROAT: 1
DIARRHEA: 0
FACIAL SWELLING: 0
ALLERGIC/IMMUNOLOGIC NEGATIVE: 1
CHEST TIGHTNESS: 0
EYE DISCHARGE: 0
BACK PAIN: 0
RHINORRHEA: 0
NAUSEA: 0

## 2024-07-18 ASSESSMENT — PATIENT HEALTH QUESTIONNAIRE - PHQ9
SUM OF ALL RESPONSES TO PHQ QUESTIONS 1-9: 0
SUM OF ALL RESPONSES TO PHQ QUESTIONS 1-9: 0
SUM OF ALL RESPONSES TO PHQ9 QUESTIONS 1 & 2: 0
2. FEELING DOWN, DEPRESSED OR HOPELESS: NOT AT ALL
SUM OF ALL RESPONSES TO PHQ QUESTIONS 1-9: 0
SUM OF ALL RESPONSES TO PHQ QUESTIONS 1-9: 0
1. LITTLE INTEREST OR PLEASURE IN DOING THINGS: NOT AT ALL

## 2024-07-18 NOTE — PROGRESS NOTES
[facility]  Trudi \"Camille\" AVANI Gordon  [address], [place] [address]  [phone]         ASSESSMENT AND PLAN    Problem List Items Addressed This Visit          Nervous and Auditory    Recurrent suppurative otitis media of left ear without spontaneous rupture of tympanic membrane - Primary     Recurrence. Has stopped flonase due to stating it doesn't do anything for her, but suspect this would help reduce environment that fosters these ear infections. Regardless, denies being interested in restarting.   - Augmentin prescribed. Discussed taking with water, with food, & with probiotic to avoid GI side effects. Discussed that can transfer in breast milk so to observe for baby GI disturbance or development of thrush.   - Fluconazole po x 1 prescribed prn due to history of yeast infection with antibiotic use. Advised this could possibly pass through breast milk, but that not contraindicated.         Relevant Medications    amoxicillin-clavulanate (AUGMENTIN) 875-125 MG per tablet    fluconazole (DIFLUCAN) 150 MG tablet       Other    Sore throat     Mild sore throat that is erythemic on exam w/o exudate & w/o cough. 4 kids. Warranted rapid strep, which was negative.          Relevant Orders    AMB POC COVID-19 COV (Completed)    AMB POC RAPID INFLUENZA TEST (Completed)    AMB POC RAPID STREP A (Completed)    AMB POC RSV (Completed)    Malaise and fatigue      Recent sick contacts. With malaise & fatigue (beyond norm of having 4 kids) & with infant at home, requested full viral panel & this is definitely warranted. Thankfully, full viral panel negative.         Relevant Orders    AMB POC COVID-19 COV (Completed)    AMB POC RAPID INFLUENZA TEST (Completed)    AMB POC RAPID STREP A (Completed)    AMB POC RSV (Completed)    Scant vaginal bleeding     IUD placed last month s/p 4th vaginal delivery earlier this year  - reports no previous breakthrough bleeding with prior

## 2024-07-18 NOTE — PATIENT INSTRUCTIONS
IT WAS GREAT TO MEET YOU TODAY!    YOUR LABS WERE ALL NEGATIVE- PLEASE LET US KNOW IF YOU CAN'T SEE THEM ON MYCHART OR HAVE ANY QUESTIONS.    PLEASE TAKE ALL MEDICATION AS DISCUSSED.   - TAKE YOUR AUGMENTIN ANTIBIOTIC WITH FOOD, TO COMPLETION, WITH LOTS OF WATER, & WITH A PROBIOTIC TO AVOID GI SIDE EFFECTS  - TAKE THE FLUCONAZOLE ANTIFUNGAL DOSE IF YOU DEVELOP SIGNS OF A YEAST INFECTION  - RECONSIDER STARTING FLONASE, AT LEAST WHILE YOU ARE SICK.  - TRY CHLORASEPTIC SPRAY FOR SORE THROAT & TEA WITH HONEY    WEAR YOUR SEATBELT.     WE WILL SEE YOU AGAIN AT YOUR NEXT APPOINTMENT DR. QUEZADA BUT PLEASE CALL WITH CONCERNS [phone] . HOPE YOU FEEL BETTER SOON!

## 2024-07-18 NOTE — ASSESSMENT & PLAN NOTE
Recurrence. Has stopped flonase due to stating it doesn't do anything for her, but suspect this would help reduce environment that fosters these ear infections. Regardless, denies being interested in restarting.   - Augmentin prescribed. Discussed taking with water, with food, & with probiotic to avoid GI side effects. Discussed that can transfer in breast milk so to observe for baby GI disturbance or development of thrush.   - Fluconazole po x 1 prescribed prn due to history of yeast infection with antibiotic use. Advised this could possibly pass through breast milk, but that not contraindicated.

## 2024-07-18 NOTE — ASSESSMENT & PLAN NOTE
Recent sick contacts. With malaise & fatigue (beyond norm of having 4 kids) & with infant at home, requested full viral panel & this is definitely warranted. Thankfully, full viral panel negative.

## 2024-07-18 NOTE — ASSESSMENT & PLAN NOTE
IUD placed last month s/p 4th vaginal delivery earlier this year  - reports no previous breakthrough bleeding with prior IUDs  - reports \"mini period\" both last month after placement and this month  - describes episodes as having food cravings, brownish blood spotting  - reports recently treated with vaginal metronidazole for inconclusive BV, but that symptoms resolved after only taking a dose or 2  - states currently occurring, but last time went away after 5-7 days  - advised to contact OB/Gyn if develops pain, heavy discharge, bleeding that does not go away or other concerns  - advised that likely due to hormones still adjusting post-pregnancy

## 2024-07-18 NOTE — ASSESSMENT & PLAN NOTE
Mild sore throat that is erythemic on exam w/o exudate & w/o cough. 4 kids. Warranted rapid strep, which was negative.

## 2024-07-22 ENCOUNTER — OFFICE VISIT (OUTPATIENT)
Dept: PRIMARY CARE CLINIC | Facility: CLINIC | Age: 24
End: 2024-07-22
Payer: COMMERCIAL

## 2024-07-22 ENCOUNTER — TELEPHONE (OUTPATIENT)
Dept: PRIMARY CARE CLINIC | Facility: CLINIC | Age: 24
End: 2024-07-22

## 2024-07-22 VITALS
HEART RATE: 80 BPM | HEIGHT: 66 IN | DIASTOLIC BLOOD PRESSURE: 74 MMHG | WEIGHT: 159 LBS | SYSTOLIC BLOOD PRESSURE: 98 MMHG | OXYGEN SATURATION: 98 % | TEMPERATURE: 97.3 F | BODY MASS INDEX: 25.55 KG/M2

## 2024-07-22 DIAGNOSIS — R19.7 NAUSEA VOMITING AND DIARRHEA: Primary | ICD-10-CM

## 2024-07-22 DIAGNOSIS — R53.83 MALAISE AND FATIGUE: ICD-10-CM

## 2024-07-22 DIAGNOSIS — R53.81 MALAISE AND FATIGUE: ICD-10-CM

## 2024-07-22 DIAGNOSIS — R11.2 NAUSEA VOMITING AND DIARRHEA: Primary | ICD-10-CM

## 2024-07-22 LAB
ALBUMIN SERPL-MCNC: 4.4 G/DL (ref 3.5–5)
ALBUMIN/GLOB SERPL: 1.4 (ref 1–1.9)
ALP SERPL-CCNC: 111 U/L (ref 35–104)
ALT SERPL-CCNC: 19 U/L (ref 12–65)
AMYLASE SERPL-CCNC: 42 U/L (ref 25–115)
ANION GAP SERPL CALC-SCNC: 10 MMOL/L (ref 9–18)
AST SERPL-CCNC: 25 U/L (ref 15–37)
BASOPHILS # BLD: 0 K/UL (ref 0–0.2)
BASOPHILS NFR BLD: 1 % (ref 0–2)
BILIRUB SERPL-MCNC: 0.7 MG/DL (ref 0–1.2)
BUN SERPL-MCNC: 17 MG/DL (ref 6–23)
CALCIUM SERPL-MCNC: 9.7 MG/DL (ref 8.8–10.2)
CHLORIDE SERPL-SCNC: 105 MMOL/L (ref 98–107)
CO2 SERPL-SCNC: 24 MMOL/L (ref 20–28)
CREAT SERPL-MCNC: 0.72 MG/DL (ref 0.6–1.1)
DIFFERENTIAL METHOD BLD: ABNORMAL
EOSINOPHIL # BLD: 0.4 K/UL (ref 0–0.8)
EOSINOPHIL NFR BLD: 6 % (ref 0.5–7.8)
ERYTHROCYTE [DISTWIDTH] IN BLOOD BY AUTOMATED COUNT: 13.6 % (ref 11.9–14.6)
GLOBULIN SER CALC-MCNC: 3.2 G/DL (ref 2.3–3.5)
GLUCOSE SERPL-MCNC: 83 MG/DL (ref 70–99)
HCT VFR BLD AUTO: 42 % (ref 35.8–46.3)
HGB BLD-MCNC: 13.8 G/DL (ref 11.7–15.4)
IMM GRANULOCYTES # BLD AUTO: 0 K/UL (ref 0–0.5)
IMM GRANULOCYTES NFR BLD AUTO: 0 % (ref 0–5)
LIPASE SERPL-CCNC: 19 U/L (ref 13–60)
LYMPHOCYTES # BLD: 3.1 K/UL (ref 0.5–4.6)
LYMPHOCYTES NFR BLD: 49 % (ref 13–44)
MAGNESIUM SERPL-MCNC: 1.9 MG/DL (ref 1.8–2.4)
MCH RBC QN AUTO: 30.1 PG (ref 26.1–32.9)
MCHC RBC AUTO-ENTMCNC: 32.9 G/DL (ref 31.4–35)
MCV RBC AUTO: 91.7 FL (ref 82–102)
MONOCYTES # BLD: 0.4 K/UL (ref 0.1–1.3)
MONOCYTES NFR BLD: 6 % (ref 4–12)
NEUTS SEG # BLD: 2.4 K/UL (ref 1.7–8.2)
NEUTS SEG NFR BLD: 38 % (ref 43–78)
NRBC # BLD: 0 K/UL (ref 0–0.2)
PLATELET # BLD AUTO: 220 K/UL (ref 150–450)
PMV BLD AUTO: 10.7 FL (ref 9.4–12.3)
POTASSIUM SERPL-SCNC: 3.9 MMOL/L (ref 3.5–5.1)
PROT SERPL-MCNC: 7.7 G/DL (ref 6.3–8.2)
RBC # BLD AUTO: 4.58 M/UL (ref 4.05–5.2)
SODIUM SERPL-SCNC: 139 MMOL/L (ref 136–145)
WBC # BLD AUTO: 6.3 K/UL (ref 4.3–11.1)

## 2024-07-22 PROCEDURE — 99214 OFFICE O/P EST MOD 30 MIN: CPT

## 2024-07-22 RX ORDER — FLUCONAZOLE 150 MG/1
TABLET ORAL
COMMUNITY
Start: 2024-07-18

## 2024-07-22 RX ORDER — ONDANSETRON 4 MG/1
4 TABLET, ORALLY DISINTEGRATING ORAL 3 TIMES DAILY PRN
Qty: 21 TABLET | Refills: 0 | Status: SHIPPED | OUTPATIENT
Start: 2024-07-22

## 2024-07-22 ASSESSMENT — ENCOUNTER SYMPTOMS
ABDOMINAL PAIN: 1
SHORTNESS OF BREATH: 0
ABDOMINAL DISTENTION: 0
ANAL BLEEDING: 0
ALLERGIC/IMMUNOLOGIC NEGATIVE: 1
RECTAL PAIN: 0
VOMITING: 1
COUGH: 0
EYES NEGATIVE: 1
CHEST TIGHTNESS: 0
CONSTIPATION: 1
BLOOD IN STOOL: 0
DIARRHEA: 1
NAUSEA: 1

## 2024-07-22 NOTE — ASSESSMENT & PLAN NOTE
Acute onset Saturday  - progressed from N/V/ fever Saturday to severe fatigue Sunday to watery diarrhea (showed photo) today  - denies OTC meds   - states held the antibiotic prescribed for her ear infection after taking Friday due to not wanting to become dehydrated, but does not appear to be having a reaction to the antibiotic itself so requested zofran so can take it   - ordered zofran & advised to restart antibiotic for ear infection, to take it with lots of water, to take with food, and to take with probiotic  - requested workup for gallbladder disease lipase ordered  - ordered cmp & mg to assess electrolytes & liver enzymes  - cbc for multitude of symptoms and progression from ear infection to gastrointestinal system issues  - suggested ultrasound to r/o ovarian cyst or torsion, appendicitis, or ectopic pregnancy, but did not want to proceed at this time.   - reiterated hydration & bland diet

## 2024-07-22 NOTE — TELEPHONE ENCOUNTER
Ever since Thursday has been getting worse.   Stomach was hurting on Friday and vomitting on Saturday. Been out of it this weekend and could barely get off the couch.

## 2024-07-22 NOTE — PATIENT INSTRUCTIONS
IT WAS GREAT TO SEE YOU AND STEVEN AGAIN TODAY! WE HOPE YOU FEEL BETTER SOON.    WE WILL CONTACT YOU WITH THE RESULTS OF YOUR LABS.     PLEASE CONTACT US, IF YOU CHANGE YOUR MIND ABOUT AN ABDOMINAL ULTRASOUND- IT WOULD HELP US ASSESS FOR OVARIAN CYST, ECTOPIC PREGNANCY, APPENDICITIS, GALLBLADDER DISEASE AND MORE.     PLEASE GO TO YOUR NEAREST EMERGENCY ROOM OR CALL 911 WITH SUDDEN WORSENING OF SYMPTOMS.    PLEASE TAKE ALL MEDICATION AS DISCUSSED.PLEASE TAKE THE ZOFRAN AS NEEDED FOR NAUSEA. PLEASE RESUME YOUR ANTIBIOTIC FOR THE EAR INFECTION AND TAKE WITH FOOD AS WELL AS A PROBIOTIC. DRINK LOTS OF WATER. EAT A BLAND DIET UNTIL YOUR NAUSEA, VOMITING, AND DIARRHEA IMPROVE.    AS ALWAYS, WEAR YOUR SEATBELT.     WE WILL SEE YOU AGAIN AT YOUR APPOINTMENT WITH DR. QUEZADA BUT PLEASE CALL WITH CONCERNS [phone]

## 2024-07-22 NOTE — PROGRESS NOTES
[facility]  Trudi \"Camille\" AVANI Gordon  [address], [place] [address]  [phone]         ASSESSMENT AND PLAN    Problem List Items Addressed This Visit          Digestive    Nausea vomiting and diarrhea - Primary     Acute onset Saturday  - progressed from N/V/ fever Saturday to severe fatigue Sunday to watery diarrhea (showed photo) today  - denies OTC meds   - states held the antibiotic prescribed for her ear infection after taking Friday due to not wanting to become dehydrated, but does not appear to be having a reaction to the antibiotic itself so requested zofran so can take it   - ordered zofran & advised to restart antibiotic for ear infection, to take it with lots of water, to take with food, and to take with probiotic  - requested workup for gallbladder disease lipase ordered  - ordered cmp & mg to assess electrolytes & liver enzymes  - cbc for multitude of symptoms and progression from ear infection to gastrointestinal system issues  - suggested ultrasound to r/o ovarian cyst or torsion, appendicitis, or ectopic pregnancy, but did not want to proceed at this time.   - reiterated hydration & bland diet         Relevant Medications    ondansetron (ZOFRAN-ODT) 4 MG disintegrating tablet    Other Relevant Orders    CBC with Auto Differential    Comprehensive Metabolic Panel    Magnesium    Lipase    Amylase       Other    Malaise and fatigue        The diagnoses and plan were discussed with the patient, who verbalizes understanding and agrees with plan.  All questions answered.    Chief Complaint    Chief Complaint   Patient presents with    Nausea & Vomiting    Diarrhea         HISTORY OF PRESENT ILLNESS    24 y.o. female with depressive disorder & seasonal allergies presents today for acute care visit. Last seen by myself on 07/18/24 for fatigue, sore throat, & L-sided head pressure- workup revealed negative viral panel & L ear infection treated with

## 2024-08-09 ENCOUNTER — PATIENT MESSAGE (OUTPATIENT)
Dept: PRIMARY CARE CLINIC | Facility: CLINIC | Age: 24
End: 2024-08-09

## 2024-08-09 DIAGNOSIS — R11.2 NAUSEA VOMITING AND DIARRHEA: Primary | ICD-10-CM

## 2024-08-09 DIAGNOSIS — R19.7 NAUSEA VOMITING AND DIARRHEA: Primary | ICD-10-CM

## 2024-08-09 DIAGNOSIS — R10.9 ABDOMINAL PAIN, UNSPECIFIED ABDOMINAL LOCATION: ICD-10-CM

## 2024-08-17 PROBLEM — J02.9 SORE THROAT: Status: RESOLVED | Noted: 2024-07-18 | Resolved: 2024-08-17

## 2024-08-27 DIAGNOSIS — R10.9 ABDOMINAL PAIN, UNSPECIFIED ABDOMINAL LOCATION: Primary | ICD-10-CM

## 2024-08-27 DIAGNOSIS — R19.7 NAUSEA VOMITING AND DIARRHEA: ICD-10-CM

## 2024-08-27 DIAGNOSIS — R11.2 NAUSEA VOMITING AND DIARRHEA: ICD-10-CM

## 2024-10-13 NOTE — PROGRESS NOTES
Chief Complaint   Patient presents with    Routine Prenatal Visit    Ultrasound        This 23 y.o.  at 28w1d with Estimated Date of Delivery: 4/10/24 presents for routine prenatal visit. Patient has no complaints today. Pt reports good FM, no LOF, VB, ctx. Pt denies H/A, vision changes, abdom pain, N/V.    Vitals:    24 1445   BP: 120/64   Site: Right Upper Arm   Position: Sitting   Weight: 84.4 kg (186 lb)   Height: 1.676 m (5' 6\")        Patient Active Problem List    Diagnosis Date Noted    Folate deficiency 2023    Multigravida in third trimester 08/15/2023     Overview Note:     EDC by 5 6/7 week US (known LMP)    2019: CF/SMA negative (prev pregnancy)  23: NIPT Low risk, male  23:  AFP only neg       Assessment & Plan Note:     Educated patient of signs and symptoms of  labor including but not limited to regular uterine contractions every 5-7 minutes for 1 hour, vaginal bleeding or leakage of fluid to seek immediate care.       Short interval between pregnancies affecting pregnancy, antepartum 08/15/2023     Overview Note:     PLAN:  Serial growth in 3rd trimester    24:  EFW 67%, AC 71%, HODA 16.8 cm, vtx       Assessment & Plan Note:     noted      Hx of macrosomia in infant in prior pregnancy, currently pregnant 08/15/2023     Overview Note:     G1 - 10lb 13oz,  G4 - 11lb 7oz    PLAN:  Serial growth in 3rd trimester    24:  EFW 67%, AC 71%, HODA 16.8 cm, vtx       Assessment & Plan Note:     noted      Mixed incontinence 2023     Overview Note:     noted       Assessment & Plan Note:            Vitamin B12 deficiency 2023      Problem List Items Addressed This Visit              Short interval between pregnancies affecting pregnancy, antepartum     noted            Other    Mixed incontinence               Multigravida in third trimester - Primary     Educated patient of signs and symptoms of  labor including but not limited to regular  Full code as per HCP and son   PPx: heparin subq- hold. SCDs   Diet: pureed with thick liquid. Full code as per HCP and son   PPx: heparin subq  Diet: pureed with thick liquid Full code as per HCP and son   PPx: heparin subq- hold. SCDs   Diet: pureed with thick liquid    Will attempt LP under anesthesia 10/6, LP labs ordered. Unfortunately on when attempted on 10/4 INR above 2 though no elevations in LFT. Will give vitamin K 5 mg PO x1 and repeat PT/ INR, PTT over the weekend. Plan for LP on Monday 10/7. Hold heparin continue with SCDs. Neurorads okay with INR <1.5    Lethargy on 10/6: repeat CTH ordered to evaluate for hemorrhage - none noted  10/6: Discussed plan with son Gabriel at bedside ([phone]) in the morning and daughter at bedside in the afternoon. Will need GOC after discussion with neuro as they feel pt is not eating well Full code as per HCP and son   PPx: heparin subq- hold. SCDs   Diet: pureed with thick liquid    Will attempt LP under anesthesia 10/4, Lp labs ordered. Unfortunately INR above 2 though no elevations in LFT. Will give vitamin K 5 mg PO x1 and repeat PT/ INR, PTT over the weekend. If still elevated 10/5- will consult heme. Plan for LP on Monday 10/7. Hold heparin continue with SCDs.    10/4: discussed plan with shirin Rios at [phone] . Neurorads to reach out to Gabriel about reschedule LP for Monday.  10/5: Called shirin Rios for updates, no answer at this time Full code as per HCP and son   PPx: heparin subq- hold. SCDs   Diet: pureed with thick liquid    Will attempt LP under anesthesia 10/4, Lp labs ordered. Unfortunately INR above 2 though no elevations in LFT. Will give vitamin K 5 mg PO x1 and repeat PT/ INR, PTT over the weekend. If still elevated 10/5- will consult heme. Plan for LP on Monday 10/7. Hold heparin continue with SCDs.    10/4: discussed plan with shirin Rios at [phone] . Neurorads to reach out to Gabriel about reschedule LP for Monday. Full code as per HCP and son   PPx: heparin subq- hold. SCDs   Diet: upgraded to regular. Full code as per HCP and son   PPx: heparin subq- hold. SCDs   Diet: pureed with thick liquid Full code as per HCP and son   PPx: heparin subq  Diet: pureed with thick liquid Full code as per HCP and son   PPx: heparin subq- hold. SCDs   Diet: pureed with thick liquid    Will attempt LP under anesthesia 10/6, LP labs ordered. Unfortunately on when attempted on 10/4 INR above 2 though no elevations in LFT. Will give vitamin K 5 mg PO x1 and repeat PT/ INR, PTT over the weekend. Plan for LP on Monday 10/7. Hold heparin continue with SCDs. Neurorads okay with INR <1.5    Lethargy on 10/6: repeat CTH ordered to evaluate for hemorrhage - none noted  10/6: Discussed plan with son Gabriel at bedside ([phone]) in the morning and daughter at bedside in the afternoon. Will need GOC after discussion with neuro as they feel pt is not eating well.    Patient not on schedule for Neurorads today--> case escalated to expedite the LP with anesthesia.   Discussed with son regarding differential and informed Neurology that family would like to speak to them  Son aware Full code as per HCP and son   PPx: heparin subq- hold. SCDs   Diet: pureed with thick liquid    Will attempt LP under anesthesia 10/6, LP labs ordered. Unfortunately on when attempted on 10/4 INR above 2 though no elevations in LFT. Will give vitamin K 5 mg PO x1 and repeat PT/ INR, PTT over the weekend. Plan for LP on Monday 10/7. Hold heparin continue with SCDs. Neurorads okay with INR <1.5    Lethargy on 10/6: repeat CTH ordered to evaluate for hemorrhage - none noted  10/6: Discussed plan with son Gabriel at bedside ([phone]) in the morning and daughter at bedside in the afternoon. Will need GOC after discussion with neuro as they feel pt is not eating well.    Patient not on schedule for Neurorads today--> case escalated to expedite the LP with anesthesia.   Discussed with son regarding differential and informed Neurology that family would like to speak to them  Son aware. Full code as per HCP and son   PPx: heparin subq  Diet: pureed with thick liquid Full code as per HCP and son   PPx: heparin subq  Diet: pureed with thick liquid Full code as per HCP and son   PPx: heparin subq- hold. SCDs   Diet: pureed with thick liquid    Will attempt LP under anesthesia 10/6, LP labs ordered. Unfortunately on when attempted on 10/4 INR above 2 though no elevations in LFT. Will give vitamin K 5 mg PO x1 and repeat PT/ INR, PTT over the weekend. Plan for LP on Monday 10/7. Hold heparin continue with SCDs. Neurorads okay with INR <1.5    Lethargy on 10/6: repeat CTH ordered to evaluate for hemorrhage - none noted  10/6: Discussed plan with son Gabriel at bedside ([phone]) in the morning and daughter at bedside in the afternoon. Will need GOC after discussion with neuro as they feel pt is not eating well.    Patient not on schedule for Neurorads today--> case escalated to expedite the LP with anesthesia.   Discussed with son regarding differential and informed Neurology that family would like to speak to them  Son spoke to Neurology as requested. Full code as per HCP and son   PPx: heparin subq- hold. SCDs   Diet: upgraded to regular.

## 2024-11-08 ENCOUNTER — OFFICE VISIT (OUTPATIENT)
Dept: PRIMARY CARE CLINIC | Facility: CLINIC | Age: 24
End: 2024-11-08
Payer: COMMERCIAL

## 2024-11-08 VITALS — BODY MASS INDEX: 26.36 KG/M2 | HEIGHT: 66 IN | WEIGHT: 164 LBS

## 2024-11-08 DIAGNOSIS — F32.A FATIGUE DUE TO DEPRESSION: ICD-10-CM

## 2024-11-08 DIAGNOSIS — F32.A ANXIETY AND DEPRESSION: Primary | ICD-10-CM

## 2024-11-08 DIAGNOSIS — F41.9 ANXIETY AND DEPRESSION: Primary | ICD-10-CM

## 2024-11-08 DIAGNOSIS — H53.8 BLURRED VISION: ICD-10-CM

## 2024-11-08 DIAGNOSIS — R53.83 FATIGUE DUE TO DEPRESSION: ICD-10-CM

## 2024-11-08 DIAGNOSIS — E53.8 VITAMIN B12 DEFICIENCY: ICD-10-CM

## 2024-11-08 LAB
25(OH)D3 SERPL-MCNC: 23.3 NG/ML (ref 30–100)
VIT B12 SERPL-MCNC: 271 PG/ML (ref 193–986)

## 2024-11-08 PROCEDURE — 99215 OFFICE O/P EST HI 40 MIN: CPT

## 2024-11-08 ASSESSMENT — PATIENT HEALTH QUESTIONNAIRE - PHQ9
SUM OF ALL RESPONSES TO PHQ QUESTIONS 1-9: 0
2. FEELING DOWN, DEPRESSED OR HOPELESS: NOT AT ALL
SUM OF ALL RESPONSES TO PHQ QUESTIONS 1-9: 0
SUM OF ALL RESPONSES TO PHQ9 QUESTIONS 1 & 2: 0
SUM OF ALL RESPONSES TO PHQ QUESTIONS 1-9: 0
1. LITTLE INTEREST OR PLEASURE IN DOING THINGS: NOT AT ALL
SUM OF ALL RESPONSES TO PHQ QUESTIONS 1-9: 0

## 2024-11-08 NOTE — PATIENT INSTRUCTIONS
IT WAS GREAT TO SEE YOU AGAIN TODAY!    WE WILL CONTACT YOU WITH THE RESULTS OF YOUR LABS.    PLEASE TAKE ALL MEDICATION AS DISCUSSED.   - PROZAC 20 MG DAILY IN THE MORNING. AFTER 1 WEEK, IF TOLERATING, TAKE 40 MG DAILY IN THE MORNING. IF STILL TOLERATING WELL, CAN TITRATE UP TO 60 MG DAILY IN THE MORNING. PLEASE LET US KNOW, WHEN YOU HAVE 1 WEEK LEFT OF THE MEDICATION SO WE CAN SEND IN APPROPRIATELY DOSED REFILL. REMEMBER, WILL TAKE AT LEAST 1 MONTH TO START WORKING. MUST BE TAKEN DAILY. WE CAN CONTINUE TO TITRATE UP, BUT MAX DAILY DOSE IS 80 MG.     DRINK LOTS OF WATER. WEAR YOUR SEATBELT. PLEASE SEND US [employer] TO FILL OUT ON YOUR BEHALF. TRY TO GET BACK INTO EXERCISING FOR YOUR MENTAL HEALTH.    WE WILL SEE YOU AGAIN AT YOUR NEXT APPOINTMENT WITH US NEXT JUNE, BUT PLEASE SEND [employer] MESSAGE OR CALL WITH CONCERNS [phone]

## 2024-11-08 NOTE — PROGRESS NOTES
Constitutional:       General: She is not in acute distress.     Appearance: Normal appearance. She is normal weight. She is not ill-appearing or toxic-appearing.   HENT:      Head: Normocephalic and atraumatic.   Eyes:      Pupils: Pupils are equal, round, and reactive to light.   Cardiovascular:      Rate and Rhythm: Normal rate and regular rhythm.      Pulses: Normal pulses.      Heart sounds: Normal heart sounds. No murmur heard.     No friction rub.   Pulmonary:      Effort: Pulmonary effort is normal. No respiratory distress.      Breath sounds: Normal breath sounds.   Chest:      Chest wall: No tenderness.   Abdominal:      General: Abdomen is flat.      Palpations: Abdomen is soft.      Tenderness: There is no abdominal tenderness. There is no right CVA tenderness, left CVA tenderness, guarding or rebound.   Musculoskeletal:         General: Normal range of motion.      Cervical back: Normal range of motion.   Skin:     General: Skin is warm and dry.      Coloration: Skin is not pale.   Neurological:      General: No focal deficit present.      Mental Status: She is alert and oriented to person, place, and time. Mental status is at baseline.   Psychiatric:         Mood and Affect: Mood normal.         Behavior: Behavior normal.         Thought Content: Thought content normal.         Judgment: Judgment normal.             RESULTS    No results found for this visit on 11/08/24.      IVAN Christian  10:07 AM  11/08/24

## 2024-11-09 DIAGNOSIS — E55.9 VITAMIN D DEFICIENCY: Primary | ICD-10-CM

## 2024-12-19 ENCOUNTER — TELEPHONE (OUTPATIENT)
Dept: PRIMARY CARE CLINIC | Facility: CLINIC | Age: 24
End: 2024-12-19

## 2024-12-19 NOTE — TELEPHONE ENCOUNTER
Pt is checking the statis of her leave of absence paperwork that is supposed to be faxed to her employer. Call pt and let her know.

## 2025-01-14 ENCOUNTER — OFFICE VISIT (OUTPATIENT)
Dept: PRIMARY CARE CLINIC | Facility: CLINIC | Age: 25
End: 2025-01-14
Payer: COMMERCIAL

## 2025-01-14 VITALS
SYSTOLIC BLOOD PRESSURE: 116 MMHG | TEMPERATURE: 97.5 F | DIASTOLIC BLOOD PRESSURE: 72 MMHG | HEART RATE: 85 BPM | BODY MASS INDEX: 26.36 KG/M2 | HEIGHT: 66 IN | WEIGHT: 164 LBS | OXYGEN SATURATION: 99 %

## 2025-01-14 DIAGNOSIS — B85.0 PEDICULOSIS CAPITIS: Primary | ICD-10-CM

## 2025-01-14 DIAGNOSIS — F41.9 ANXIETY AND DEPRESSION: ICD-10-CM

## 2025-01-14 DIAGNOSIS — T14.8XXA BRUISING: ICD-10-CM

## 2025-01-14 DIAGNOSIS — F32.A ANXIETY AND DEPRESSION: ICD-10-CM

## 2025-01-14 DIAGNOSIS — L29.9 GENERALIZED PRURITUS: ICD-10-CM

## 2025-01-14 PROCEDURE — 99215 OFFICE O/P EST HI 40 MIN: CPT

## 2025-01-14 RX ORDER — HYDROXYZINE HYDROCHLORIDE 25 MG/1
25 TABLET, FILM COATED ORAL NIGHTLY
Qty: 30 TABLET | Refills: 0 | Status: SHIPPED | OUTPATIENT
Start: 2025-01-14 | End: 2025-02-13

## 2025-01-14 RX ORDER — BUSPIRONE HYDROCHLORIDE 7.5 MG/1
7.5 TABLET ORAL 2 TIMES DAILY
Qty: 60 TABLET | Refills: 0 | Status: SHIPPED | OUTPATIENT
Start: 2025-01-14 | End: 2025-02-13

## 2025-01-14 SDOH — ECONOMIC STABILITY: FOOD INSECURITY: WITHIN THE PAST 12 MONTHS, YOU WORRIED THAT YOUR FOOD WOULD RUN OUT BEFORE YOU GOT MONEY TO BUY MORE.: NEVER TRUE

## 2025-01-14 SDOH — ECONOMIC STABILITY: FOOD INSECURITY: WITHIN THE PAST 12 MONTHS, THE FOOD YOU BOUGHT JUST DIDN'T LAST AND YOU DIDN'T HAVE MONEY TO GET MORE.: NEVER TRUE

## 2025-01-14 ASSESSMENT — PATIENT HEALTH QUESTIONNAIRE - PHQ9
8. MOVING OR SPEAKING SO SLOWLY THAT OTHER PEOPLE COULD HAVE NOTICED. OR THE OPPOSITE, BEING SO FIGETY OR RESTLESS THAT YOU HAVE BEEN MOVING AROUND A LOT MORE THAN USUAL: NOT AT ALL
10. IF YOU CHECKED OFF ANY PROBLEMS, HOW DIFFICULT HAVE THESE PROBLEMS MADE IT FOR YOU TO DO YOUR WORK, TAKE CARE OF THINGS AT HOME, OR GET ALONG WITH OTHER PEOPLE: NOT DIFFICULT AT ALL
SUM OF ALL RESPONSES TO PHQ QUESTIONS 1-9: 8
SUM OF ALL RESPONSES TO PHQ QUESTIONS 1-9: 8
6. FEELING BAD ABOUT YOURSELF - OR THAT YOU ARE A FAILURE OR HAVE LET YOURSELF OR YOUR FAMILY DOWN: NOT AT ALL
5. POOR APPETITE OR OVEREATING: MORE THAN HALF THE DAYS
SUM OF ALL RESPONSES TO PHQ9 QUESTIONS 1 & 2: 4
SUM OF ALL RESPONSES TO PHQ QUESTIONS 1-9: 8
SUM OF ALL RESPONSES TO PHQ QUESTIONS 1-9: 8
7. TROUBLE CONCENTRATING ON THINGS, SUCH AS READING THE NEWSPAPER OR WATCHING TELEVISION: NOT AT ALL
4. FEELING TIRED OR HAVING LITTLE ENERGY: NOT AT ALL
9. THOUGHTS THAT YOU WOULD BE BETTER OFF DEAD, OR OF HURTING YOURSELF: NOT AT ALL
2. FEELING DOWN, DEPRESSED OR HOPELESS: MORE THAN HALF THE DAYS
1. LITTLE INTEREST OR PLEASURE IN DOING THINGS: MORE THAN HALF THE DAYS
3. TROUBLE FALLING OR STAYING ASLEEP: MORE THAN HALF THE DAYS

## 2025-01-14 ASSESSMENT — ENCOUNTER SYMPTOMS
DIARRHEA: 0
CHEST TIGHTNESS: 0
VOMITING: 0
NAUSEA: 0
COUGH: 0
BLOOD IN STOOL: 0
SHORTNESS OF BREATH: 0

## 2025-01-14 NOTE — PROGRESS NOTES
Skin:     General: Skin is warm and dry.      Coloration: Skin is not pale.      Findings: Bruising present.      Comments: Nits in hair. Various bruises of different stages.    Neurological:      General: No focal deficit present.      Mental Status: She is alert and oriented to person, place, and time. Mental status is at baseline.   Psychiatric:         Mood and Affect: Mood normal.         Behavior: Behavior normal.         Thought Content: Thought content normal.         Judgment: Judgment normal.             RESULTS    No results found for this or any previous visit (from the past 144 hour(s)).      IVAN Christian  9:04 AM  01/18/25

## 2025-01-18 PROBLEM — T14.8XXA BRUISING: Status: ACTIVE | Noted: 2025-01-18

## 2025-01-18 ASSESSMENT — ENCOUNTER SYMPTOMS
EYES NEGATIVE: 1
ALLERGIC/IMMUNOLOGIC NEGATIVE: 1

## 2025-01-18 NOTE — ASSESSMENT & PLAN NOTE
Chronic, recurrent, unmanaged  - phq 8 today. Denies suicidal or homicidal ideation or intent. Has had some intrusive thoughts that bother her but states she is able to get them to go away by not paying them too much attention.   - referral to counseling, since still hasn't heard back from   - prozac caused bruising & vivid ptsd-related dreams, per patient, so stopped  - buspar 7.5 mg bid prescribed  - hydroxyzine sent prn for sleep/ anxiety/ itching. Breastfeeding precautions discussed.   - requests additionally notes/ paperwork for [employer]/ [employer]. Working on it.   - return if symptoms acutely worsen or continue to persist w/o improvement past treatment  - f/u routine 6/3/2025 with Dr. Fang or sooner for acute care needs

## 2025-01-18 NOTE — ASSESSMENT & PLAN NOTE
Acute, unmanaged  - reports started w/ prozac  - also struggling w/ depression, query anemic component  - advised to start daily centrum multivitamin w/ iron  - cbc w/ diff ordered  - denies trauma, but has been itching at night  - - return if symptoms acutely worsen or continue to persist w/o improvement past treatment  - f/u routine 6/3/2025 with Dr. Fang or sooner for acute care needs

## 2025-01-18 NOTE — ASSESSMENT & PLAN NOTE
Acute, recurrent, unmanaged  - recurrent due to repeated exposure. Friend who's daughter keeps giving back to patient's kids who give to patient they come up with plan to treat everyone in house hold with the [employer] at same time & Korina would like prescription grade for herself.   - nits seen today.   - licide shampoo x 2 treatments prescribed w/ directions  - advised that lice and/or treatment may be what's making her scalp itch  - education provided in AVS  - return if symptoms acutely worsen or continue to persist w/o improvement past treatment  - f/u routine 6/3/2025 with Dr. Fang or sooner for acute care needs

## 2025-01-18 NOTE — PATIENT INSTRUCTIONS
IT WAS GREAT TO SEE YOU TUESDAY!    WE WILL CONTACT YOU WITH THE RESULTS OF YOUR LABS. (I ENDED UP ORDERING A COMPLETE BLOOD COUNT FOR YOUR BRUISING)    PLEASE TAKE ALL MEDICATION AS DISCUSSED.   - TAKE BUSPAR 7.5 MG TWICE DAILY FOR ANXIETY AND DEPRESSION  - TAKE ATARAX (HYDROXYZINE) NIGHTLY FOR ITCHING/ ANXIETY/ SLEEP. PLEASE TAKE AFTER BREASTFEEDING. PLEASE KEEP AN EYE FOR ANY CHANGES IN MIS.   - CONTINUE VITAMIN D 1000 UNITS DAILY FOR VITAMIN D DEFICIENCY. IN ADDITION, PLEASE START CENTRUM (OR EQUIVALENT) WOMEN'S DAILY MULTIVITAMIN THAT CONTAINS IRON AND VITAMIN D.   - FOLLOW INSTRUCTIONS ON PRESCRIPTION FOR THE LICIDE SHAMPOO. ONLY TWO TREATMENTS SHOULD BE NEEDED,  BY AT LEAST 1 WEEK. PLEASE MAKE SURE TO TREAT EVERY ONE IN THE HOUSEHOLD AND CLOSE FRIEND'S HOUSEHOLD TO PREVENT RECURRENCE.     DRINK LOTS OF WATER. WEAR YOUR SEATBELT :D FOCUS ON FRESH FRUITS AND VEGGIES DAILY, LEAN MEATS LIKE CHICKEN OR FISH, AND LOTS OF WATER. AVOID FRIED, FATTY FOODS, BREAD, PASTA, AND \"ADDED\" SUGAR. EXERCISE 5 TIMES A WEEK FOR AT LEAST 30 MINUTES AT A TIME. FOCUS ON CONSISTENT SLEEP SCHEDULE.    REFERRAL HAS BEEN SENT TO [facility]. PLEASE LET US KNOW, IF YOU DO NOT HEAR ABOUT SCHEDULING IN NEXT 2 WEEKS.     WE WILL SEE YOU AGAIN AT YOUR NEXT APPOINTMENT WITH DR. QUEZADA 6/3/2025 BUT PLEASE SEND [employer] MESSAGE OR CALL WITH CONCERNS [phone]

## 2025-01-27 ENCOUNTER — TELEPHONE (OUTPATIENT)
Dept: OBGYN CLINIC | Age: 25
End: 2025-01-27

## 2025-01-27 NOTE — TELEPHONE ENCOUNTER
Pt lvm wanting a call back,   Pt stated for a week she had heavy bleeding and thought it may have been since she stopped nursing. Pt stated Friday she had very bad cramps and went to change her pad and her IUD fell out. Pt has not taken UPT. Pt stated she would like to try another form of BC but is unsure if she can remember to take the pills. Informed pt of other options and she stated she would like to try the patch. Informed pt that we recommend taking UPT and let us know results. Informed pt I will need to send a note to Dr. Fang regarding this and BC patches.     Pt has completely stopped nursing.

## 2025-01-28 RX ORDER — NORELGESTROMIN AND ETHINYL ESTRADIOL 35; 150 UG/MG; UG/MG
1 PATCH TRANSDERMAL WEEKLY
Qty: 3 PATCH | Refills: 11 | Status: SHIPPED | OUTPATIENT
Start: 2025-01-28

## 2025-03-25 ENCOUNTER — OFFICE VISIT (OUTPATIENT)
Dept: PRIMARY CARE CLINIC | Facility: CLINIC | Age: 25
End: 2025-03-25
Payer: COMMERCIAL

## 2025-03-25 VITALS
DIASTOLIC BLOOD PRESSURE: 60 MMHG | SYSTOLIC BLOOD PRESSURE: 106 MMHG | BODY MASS INDEX: 26 KG/M2 | HEART RATE: 84 BPM | WEIGHT: 161.8 LBS | OXYGEN SATURATION: 97 % | HEIGHT: 66 IN

## 2025-03-25 DIAGNOSIS — T14.8XXA BRUISING: ICD-10-CM

## 2025-03-25 DIAGNOSIS — F32.A ANXIETY AND DEPRESSION: ICD-10-CM

## 2025-03-25 DIAGNOSIS — M25.562 LEFT MEDIAL KNEE PAIN: Primary | ICD-10-CM

## 2025-03-25 DIAGNOSIS — L73.9 FOLLICULITIS: ICD-10-CM

## 2025-03-25 DIAGNOSIS — F41.9 ANXIETY AND DEPRESSION: ICD-10-CM

## 2025-03-25 DIAGNOSIS — B85.0 PEDICULOSIS CAPITIS: ICD-10-CM

## 2025-03-25 DIAGNOSIS — L29.9 GENERALIZED PRURITUS: ICD-10-CM

## 2025-03-25 DIAGNOSIS — A63.0 CONDYLOMA ACUMINATA: ICD-10-CM

## 2025-03-25 LAB
BASOPHILS # BLD: 0.04 K/UL (ref 0–0.2)
BASOPHILS NFR BLD: 0.6 % (ref 0–2)
DIFFERENTIAL METHOD BLD: ABNORMAL
EOSINOPHIL # BLD: 0.17 K/UL (ref 0–0.8)
EOSINOPHIL NFR BLD: 2.4 % (ref 0.5–7.8)
ERYTHROCYTE [DISTWIDTH] IN BLOOD BY AUTOMATED COUNT: 12.7 % (ref 11.9–14.6)
HCT VFR BLD AUTO: 40 % (ref 35.8–46.3)
HGB BLD-MCNC: 12.8 G/DL (ref 11.7–15.4)
IMM GRANULOCYTES # BLD AUTO: 0.01 K/UL (ref 0–0.5)
IMM GRANULOCYTES NFR BLD AUTO: 0.1 % (ref 0–5)
LYMPHOCYTES # BLD: 2.77 K/UL (ref 0.5–4.6)
LYMPHOCYTES NFR BLD: 39 % (ref 13–44)
MCH RBC QN AUTO: 30 PG (ref 26.1–32.9)
MCHC RBC AUTO-ENTMCNC: 32 G/DL (ref 31.4–35)
MCV RBC AUTO: 93.7 FL (ref 82–102)
MONOCYTES # BLD: 0.28 K/UL (ref 0.1–1.3)
MONOCYTES NFR BLD: 3.9 % (ref 4–12)
NEUTS SEG # BLD: 3.84 K/UL (ref 1.7–8.2)
NEUTS SEG NFR BLD: 54 % (ref 43–78)
NRBC # BLD: 0 K/UL (ref 0–0.2)
PLATELET # BLD AUTO: 197 K/UL (ref 150–450)
PMV BLD AUTO: 11.7 FL (ref 9.4–12.3)
RBC # BLD AUTO: 4.27 M/UL (ref 4.05–5.2)
WBC # BLD AUTO: 7.1 K/UL (ref 4.3–11.1)

## 2025-03-25 PROCEDURE — 99215 OFFICE O/P EST HI 40 MIN: CPT

## 2025-03-25 RX ORDER — CEPHALEXIN 500 MG/1
500 CAPSULE ORAL 2 TIMES DAILY
Qty: 14 CAPSULE | Refills: 0 | Status: SHIPPED | OUTPATIENT
Start: 2025-03-25 | End: 2025-04-01

## 2025-03-25 RX ORDER — IBUPROFEN 800 MG/1
800 TABLET, FILM COATED ORAL
Qty: 90 TABLET | Refills: 0 | Status: SHIPPED | OUTPATIENT
Start: 2025-03-25

## 2025-03-25 RX ORDER — FLUCONAZOLE 150 MG/1
150 TABLET ORAL 2 TIMES DAILY PRN
Qty: 2 TABLET | Refills: 0 | Status: SHIPPED | OUTPATIENT
Start: 2025-03-25

## 2025-03-25 ASSESSMENT — ENCOUNTER SYMPTOMS
SHORTNESS OF BREATH: 0
VOMITING: 0
CHEST TIGHTNESS: 0
DIARRHEA: 0
EYES NEGATIVE: 1
COLOR CHANGE: 1
BACK PAIN: 0
ALLERGIC/IMMUNOLOGIC NEGATIVE: 1
NAUSEA: 0
COUGH: 0
BLOOD IN STOOL: 0

## 2025-03-25 NOTE — ASSESSMENT & PLAN NOTE
Acute, recurrent, unmanaged  - mild follicular rash on left buttocks 3ish inches from the anal opening  - chronically reported inflamed follicle that sometimes oozes per patient on left pelvis  - does not shave, but did have waxing a little over 1 month ago  - about to go on vacation, so declines doxy due to sun exposure risk. Keflex bid x 7 days.   - advised to take antibiotic with an otc probiotic, with lots of water, with food, & to completion  - diflucan 1 dose q72 h prn for yeast infection s/sx x 2 doses prescribed due to reported h/o abx-induced yeast infections  - return if symptoms acutely worsen or continue to persist w/o improvement past treatment  - f/u routine 6/3/2025 with Dr. Fang or sooner for acute care needs

## 2025-03-25 NOTE — ASSESSMENT & PLAN NOTE
Acute, recurrent, unmanaged  - fully vaccinated against hpv (endorses both doses- one in 2011 & one 2012)  - last episode when 16 y/o, per patient  - small, irregular skin-colored papule on left inner labia  - pt denies pain, but endorses occasional pruritus  - advised to f/u w/ OBGYN as needed for consideration of treatment

## 2025-03-25 NOTE — PROGRESS NOTES
[facility]  Trudi \"Camille\" AVANI Gordon  [address], [place] [address]  [phone]         ASSESSMENT AND PLAN    Problem List Items Addressed This Visit       Condyloma acuminata    Acute, recurrent, unmanaged  - fully vaccinated against hpv (endorses both doses- one in 2011 & one 2012)  - last episode when 16 y/o, per patient  - current lesion x 1 month  - small, irregular skin-colored papule on left inner labia  - pt denies pain, but endorses occasional pruritus  - advised to f/u w/ OBGYN as needed for consideration of treatment         Folliculitis    Acute, recurrent, unmanaged  - mild follicular rash on left buttocks 3ish inches from the anal opening  - chronically reported inflamed follicle that sometimes oozes per patient on left pelvis  - does not shave, but did have waxing a little over 1 month ago  - about to go on vacation, so declines doxy due to sun exposure risk. Keflex bid x 7 days.   - advised to take antibiotic with an otc probiotic, with lots of water, with food, & to completion  - diflucan 1 dose q72 h prn for yeast infection s/sx x 2 doses prescribed due to reported h/o abx-induced yeast infections  - return if symptoms acutely worsen or continue to persist w/o improvement past treatment  - f/u routine 6/3/2025 with Dr. Fang or sooner for acute care needs         Relevant Medications    cephALEXin (KEFLEX) 500 MG capsule    fluconazole (DIFLUCAN) 150 MG tablet    Left medial knee pain - Primary     Acute, unmanaged  - in setting of going to gym last 4 weeks  - has continued to go to gym, despite injury, due to benefits outweighing risks, per patient  - has tried ice, tylenol, advil  - declines narcotis  - ibuprofen 800 mg tid with meals prescribed. Advised to drink lots of water  - mild tenderness of anterior knee when medial knee palpated  - has tried knee brace, but made worse  - referral to ortho for eval & treat  -          Relevant

## 2025-03-25 NOTE — ASSESSMENT & PLAN NOTE
Acute, unmanaged  - in setting of going to gym last 4 weeks  - has continued to go to gym, despite injury, due to benefits outweighing risks, per patient  - has tried ice, tylenol, advil  - declines narcotis  - ibuprofen 800 mg tid with meals prescribed. Advised to drink lots of water  - mild tenderness of anterior knee when medial knee palpated  - has tried knee brace, but made worse  - referral to ortho for eval & treat  -

## 2025-03-25 NOTE — PATIENT INSTRUCTIONS
IT WAS GREAT TO SEE YOU TODAY! I HOPE YOU FEEL BETTER SOON AND HAVE AN AWESOME TRIP.     WE WILL CONTACT YOU WITH THE RESULTS OF YOUR LABS ONCE COMPLETED.    PLEASE TAKE ALL MEDICATION AS DISCUSSED.   - FOR FOLLICULAR RASH, TAKE KEFLEX ANTIBIOTIC TWICE DAILY FOR 7 DAYS. TAKE WITH FOOD, WATER, OVER THE COUNTER PROBIOTIC (E.G. ALIGN OR CULTURELLE), & TO COMPLETION.   - FOR ANTIBIOTIC-INDUCED YEAST INFECTION (IF YOU DEVELOP ONE), TAKE DIFLUCAN (FLUCONAZOLE) 150 MG ONCE. IF SYMPTOMS PERSIST 72 HOURS LATER, TAKE SECOND DOSE.    - FOR PAIN OF LEFT KNEE, DO REST AND ELEVATE IT AS MUCH AS POSSIBLE, BUT CAN TAKE IBUPROFEN 800 MG UP TO THREE TIMES DAILY WITH FOOD AND LOTS OF WATER. DO NOT TAKE OTHER OTC NSAIDS, SUCH AS IBUPROFEN (MOTRIN, ADVIL) OR ALEVE (NAPROXEN). YOU CAN TAKE TYLENOL WITH THE IBUPROFEN.     DRINK LOTS OF WATER. WEAR YOUR SEATBELT. FOCUS ON FRESH FRUITS AND VEGGIES DAILY, AND LEAN MEATS LIKE CHICKEN OR FISH. AVOID FRIED, FATTY FOODS, BREAD, PASTA, SALT AND \"ADDED\" SUGAR, ESPECIALLY JUICES AND SODA. AVOID TOBACCO AND EXCESSIVE ALCOHOL. EXERCISE AT LEAST 5 TIMES A WEEK FOR AT LEAST 30 MINUTES AT A TIME. KEEP A CONSISTENT SLEEP SCHEDULE.     REFERRAL SENT TO [facility]. PLEASE LET US KNOW, IF YOU DO NOT HEAR FROM THEM IN NEXT TWO WEEKS.  FOLLOW-UP WITH OBGYN ABOUT THE GENITAL WART, BUT THEY OFTEN RESOLVE ON THEIR OWN.    WE WILL SEE YOU AGAIN AT YOUR NEXT APPOINTMENT WITH DR. QUEZADA 6/3/2025 BUT PLEASE SEND [employer] MESSAGE OR CALL WITH CONCERNS [phone]

## 2025-03-26 ENCOUNTER — RESULTS FOLLOW-UP (OUTPATIENT)
Dept: PRIMARY CARE CLINIC | Facility: CLINIC | Age: 25
End: 2025-03-26

## 2025-04-30 ENCOUNTER — OFFICE VISIT (OUTPATIENT)
Dept: OBGYN CLINIC | Age: 25
End: 2025-04-30
Payer: COMMERCIAL

## 2025-04-30 VITALS
WEIGHT: 159 LBS | BODY MASS INDEX: 25.55 KG/M2 | HEIGHT: 66 IN | DIASTOLIC BLOOD PRESSURE: 64 MMHG | SYSTOLIC BLOOD PRESSURE: 102 MMHG

## 2025-04-30 DIAGNOSIS — N90.89 VULVAL LESION: ICD-10-CM

## 2025-04-30 DIAGNOSIS — N39.9 URINARY PROBLEM IN FEMALE: Primary | ICD-10-CM

## 2025-04-30 DIAGNOSIS — Z11.3 SCREEN FOR STD (SEXUALLY TRANSMITTED DISEASE): ICD-10-CM

## 2025-04-30 LAB
BILIRUBIN, URINE, POC: NEGATIVE
BLOOD URINE, POC: NORMAL
GLUCOSE URINE, POC: NEGATIVE
HCG, PREGNANCY, URINE, POC: NEGATIVE
KETONES, URINE, POC: NEGATIVE
LEUKOCYTE ESTERASE, URINE, POC: NEGATIVE
NITRITE, URINE, POC: NEGATIVE
PH, URINE, POC: 6.5 (ref 4.6–8)
PROTEIN,URINE, POC: NEGATIVE
SPECIFIC GRAVITY, URINE, POC: 1.01 (ref 1–1.03)
URINALYSIS CLARITY, POC: CLEAR
URINALYSIS COLOR, POC: YELLOW
UROBILINOGEN, POC: NORMAL MG/DL
VALID INTERNAL CONTROL, POC: YES

## 2025-04-30 PROCEDURE — 81001 URINALYSIS AUTO W/SCOPE: CPT | Performed by: NURSE PRACTITIONER

## 2025-04-30 PROCEDURE — 81025 URINE PREGNANCY TEST: CPT | Performed by: NURSE PRACTITIONER

## 2025-04-30 PROCEDURE — 99213 OFFICE O/P EST LOW 20 MIN: CPT | Performed by: NURSE PRACTITIONER

## 2025-04-30 NOTE — ASSESSMENT & PLAN NOTE
Right mons small area of follicultis 5 mm, no s/s of infection  Right labia monora small 3 mm sebacous cyst noted, scabbed  HSV and GC/CT/Tv cultures taken  Declines serum std labs  We discuss lucho care, keeping area dry and clean, avoiding friction/manipulation  Vasoline prn

## 2025-04-30 NOTE — PROGRESS NOTES
Chaperone for Intimate Exam     Chaperone was offer accepted as part of the rooming process    Chaperone: Nora DWYER     
I have reviewed the patient's visit today including history, exam and assessment by MARC Ghosh.  I agree with treatment/plan as above.    Kali Fang MD  11:01 AM  04/30/25   
Patient comes in today for painful bump on labia. Pt states she has had it for about 3 months, states it has grown in size and states when she looked recently it has a scab on it.   Pt states when her IUD fell out she had a period on 12/24 and 03/02-03/06, 03/31-04/05 and started bleeding  again on 04/30. Pt states her periods have been heavy, when she removed her tampon she felt cramping.    Pt states when she urinates she only gets a small amount out and then will have to go again shortly after.     LAST PAP:  05/22/2024 negative     LAST MAMMO:  never     LMP:  Patient's last menstrual period was 04/30/2025 (exact date).    BIRTH CONTROL:  none    TOBACCO USE:  No    FAMILY HISTORY OF:   Breast Cancer:  No   Ovarian Cancer:  No   Uterine Cancer:  No   Colon Cancer:  No    Vitals:    04/30/25 1019   BP: 102/64   BP Site: Left Upper Arm   Patient Position: Sitting   Weight: 72.1 kg (159 lb)   Height: 1.676 m (5' 6\")        Nora Luna MA  04/30/25  10:30 AM    
disease)        Relevant Orders    Chlamydia, Gonorrhea, Trichomoniasis            Orders Placed This Encounter   Procedures    Chlamydia, Gonorrhea, Trichomoniasis    HSV by ZORAIDA    AMB POC URINALYSIS DIP STICK AUTO W/ MICRO    AMB POC URINE PREGNANCY TEST, VISUAL COLOR COMPARISON       Outpatient Encounter Medications as of 4/30/2025   Medication Sig Dispense Refill    ibuprofen (ADVIL;MOTRIN) 800 MG tablet Take 1 tablet by mouth 3 times daily (with meals) (Patient not taking: Reported on 4/30/2025) 90 tablet 0    fluconazole (DIFLUCAN) 150 MG tablet Take 1 tablet by mouth 2 times daily as needed (yeast infection) Take 1 tablet with food by mouth once then repeat second dose in 3 days if needed for yeast infection. (Patient not taking: Reported on 4/30/2025) 2 tablet 0    norelgestromin-ethinyl estradiol (XULANE) 150-35 MCG/24HR Place 1 patch onto the skin once a week For 3 weeks per month. (Patient not taking: Reported on 4/30/2025) 3 patch 11     No facility-administered encounter medications on file as of 4/30/2025.               ISAÍAS Ceja - CNP 04/30/25 11:00 AM

## 2025-05-02 LAB
C TRACH RRNA SPEC QL NAA+PROBE: NEGATIVE
HSV1 DNA SPEC QL NAA+PROBE: NEGATIVE
HSV2 DNA SPEC QL NAA+PROBE: NEGATIVE
N GONORRHOEA RRNA SPEC QL NAA+PROBE: NEGATIVE
SPECIMEN SOURCE: NORMAL
SPECIMEN SOURCE: NORMAL
T VAGINALIS RRNA SPEC QL NAA+PROBE: NEGATIVE

## 2025-05-03 ENCOUNTER — RESULTS FOLLOW-UP (OUTPATIENT)
Dept: OBGYN CLINIC | Age: 25
End: 2025-05-03

## 2025-06-03 ENCOUNTER — OFFICE VISIT (OUTPATIENT)
Dept: PRIMARY CARE CLINIC | Facility: CLINIC | Age: 25
End: 2025-06-03
Payer: COMMERCIAL

## 2025-06-03 VITALS
OXYGEN SATURATION: 99 % | HEIGHT: 66 IN | WEIGHT: 156 LBS | TEMPERATURE: 97.6 F | SYSTOLIC BLOOD PRESSURE: 116 MMHG | BODY MASS INDEX: 25.07 KG/M2 | DIASTOLIC BLOOD PRESSURE: 62 MMHG | HEART RATE: 71 BPM

## 2025-06-03 DIAGNOSIS — E53.8 FOLATE DEFICIENCY: ICD-10-CM

## 2025-06-03 DIAGNOSIS — Z00.00 ANNUAL PHYSICAL EXAM: ICD-10-CM

## 2025-06-03 DIAGNOSIS — E55.9 VITAMIN D DEFICIENCY: ICD-10-CM

## 2025-06-03 DIAGNOSIS — E53.8 VITAMIN B12 DEFICIENCY: ICD-10-CM

## 2025-06-03 DIAGNOSIS — M62.08 DIASTASIS RECTI: ICD-10-CM

## 2025-06-03 DIAGNOSIS — Z00.00 ANNUAL PHYSICAL EXAM: Primary | ICD-10-CM

## 2025-06-03 LAB
25(OH)D3 SERPL-MCNC: 31.4 NG/ML (ref 30–100)
ALBUMIN SERPL-MCNC: 3.9 G/DL (ref 3.5–5)
ALBUMIN/GLOB SERPL: 1.3 (ref 1–1.9)
ALP SERPL-CCNC: 83 U/L (ref 35–104)
ALT SERPL-CCNC: 15 U/L (ref 8–45)
ANION GAP SERPL CALC-SCNC: 10 MMOL/L (ref 7–16)
AST SERPL-CCNC: 27 U/L (ref 15–37)
BASOPHILS # BLD: 0.03 K/UL (ref 0–0.2)
BASOPHILS NFR BLD: 0.4 % (ref 0–2)
BILIRUB SERPL-MCNC: 0.5 MG/DL (ref 0–1.2)
BUN SERPL-MCNC: 13 MG/DL (ref 6–23)
CALCIUM SERPL-MCNC: 9.5 MG/DL (ref 8.8–10.2)
CHLORIDE SERPL-SCNC: 105 MMOL/L (ref 98–107)
CHOLEST SERPL-MCNC: 130 MG/DL (ref 0–200)
CO2 SERPL-SCNC: 25 MMOL/L (ref 20–29)
CREAT SERPL-MCNC: 0.77 MG/DL (ref 0.6–1.1)
DIFFERENTIAL METHOD BLD: ABNORMAL
EOSINOPHIL # BLD: 0.12 K/UL (ref 0–0.8)
EOSINOPHIL NFR BLD: 1.7 % (ref 0.5–7.8)
ERYTHROCYTE [DISTWIDTH] IN BLOOD BY AUTOMATED COUNT: 12.7 % (ref 11.9–14.6)
FOLATE SERPL-MCNC: 22.1 NG/ML (ref 3.1–17.5)
GLOBULIN SER CALC-MCNC: 3 G/DL (ref 2.3–3.5)
GLUCOSE SERPL-MCNC: 86 MG/DL (ref 70–99)
HCT VFR BLD AUTO: 37.5 % (ref 35.8–46.3)
HDLC SERPL-MCNC: 45 MG/DL (ref 40–60)
HDLC SERPL: 2.9 (ref 0–5)
HGB BLD-MCNC: 12.7 G/DL (ref 11.7–15.4)
IMM GRANULOCYTES # BLD AUTO: 0.02 K/UL (ref 0–0.5)
IMM GRANULOCYTES NFR BLD AUTO: 0.3 % (ref 0–5)
LDLC SERPL CALC-MCNC: 79 MG/DL (ref 0–100)
LYMPHOCYTES # BLD: 2.24 K/UL (ref 0.5–4.6)
LYMPHOCYTES NFR BLD: 32.4 % (ref 13–44)
MCH RBC QN AUTO: 29.7 PG (ref 26.1–32.9)
MCHC RBC AUTO-ENTMCNC: 33.9 G/DL (ref 31.4–35)
MCV RBC AUTO: 87.8 FL (ref 82–102)
MONOCYTES # BLD: 0.23 K/UL (ref 0.1–1.3)
MONOCYTES NFR BLD: 3.3 % (ref 4–12)
NEUTS SEG # BLD: 4.27 K/UL (ref 1.7–8.2)
NEUTS SEG NFR BLD: 61.9 % (ref 43–78)
NRBC # BLD: 0 K/UL (ref 0–0.2)
PLATELET # BLD AUTO: 191 K/UL (ref 150–450)
PMV BLD AUTO: 11.8 FL (ref 9.4–12.3)
POTASSIUM SERPL-SCNC: 3.9 MMOL/L (ref 3.5–5.1)
PROT SERPL-MCNC: 6.9 G/DL (ref 6.3–8.2)
RBC # BLD AUTO: 4.27 M/UL (ref 4.05–5.2)
SODIUM SERPL-SCNC: 140 MMOL/L (ref 136–145)
TRIGL SERPL-MCNC: 30 MG/DL (ref 0–150)
TSH, 3RD GENERATION: 0.9 UIU/ML (ref 0.27–4.2)
VIT B12 SERPL-MCNC: 192 PG/ML (ref 193–986)
VLDLC SERPL CALC-MCNC: 6 MG/DL (ref 6–23)
WBC # BLD AUTO: 6.9 K/UL (ref 4.3–11.1)

## 2025-06-03 PROCEDURE — 99395 PREV VISIT EST AGE 18-39: CPT | Performed by: FAMILY MEDICINE

## 2025-06-03 ASSESSMENT — PATIENT HEALTH QUESTIONNAIRE - PHQ9
8. MOVING OR SPEAKING SO SLOWLY THAT OTHER PEOPLE COULD HAVE NOTICED. OR THE OPPOSITE, BEING SO FIGETY OR RESTLESS THAT YOU HAVE BEEN MOVING AROUND A LOT MORE THAN USUAL: NOT AT ALL
2. FEELING DOWN, DEPRESSED OR HOPELESS: NOT AT ALL
4. FEELING TIRED OR HAVING LITTLE ENERGY: NOT AT ALL
9. THOUGHTS THAT YOU WOULD BE BETTER OFF DEAD, OR OF HURTING YOURSELF: NOT AT ALL
10. IF YOU CHECKED OFF ANY PROBLEMS, HOW DIFFICULT HAVE THESE PROBLEMS MADE IT FOR YOU TO DO YOUR WORK, TAKE CARE OF THINGS AT HOME, OR GET ALONG WITH OTHER PEOPLE: NOT DIFFICULT AT ALL
7. TROUBLE CONCENTRATING ON THINGS, SUCH AS READING THE NEWSPAPER OR WATCHING TELEVISION: NOT AT ALL
3. TROUBLE FALLING OR STAYING ASLEEP: NOT AT ALL
1. LITTLE INTEREST OR PLEASURE IN DOING THINGS: NOT AT ALL
SUM OF ALL RESPONSES TO PHQ QUESTIONS 1-9: 0
5. POOR APPETITE OR OVEREATING: NOT AT ALL
SUM OF ALL RESPONSES TO PHQ QUESTIONS 1-9: 0
6. FEELING BAD ABOUT YOURSELF - OR THAT YOU ARE A FAILURE OR HAVE LET YOURSELF OR YOUR FAMILY DOWN: NOT AT ALL
SUM OF ALL RESPONSES TO PHQ QUESTIONS 1-9: 0
SUM OF ALL RESPONSES TO PHQ QUESTIONS 1-9: 0

## 2025-06-03 ASSESSMENT — ENCOUNTER SYMPTOMS
NAUSEA: 0
VOMITING: 0
COUGH: 0
SHORTNESS OF BREATH: 0
ABDOMINAL PAIN: 0
DIARRHEA: 0

## 2025-06-03 NOTE — PROGRESS NOTES
[facility]  Sapna Fang, DO  [address] [facility], [place] [address] [phone]         ASSESSMENT AND PLAN    Problem List Items Addressed This Visit          Musculoskeletal and Integument    Diastasis recti    Chronic from multiple pregnancies but improving as she has been going to the gym.              Other    Vitamin B12 deficiency    Relevant Orders    Vitamin B12    Folate deficiency    Relevant Orders    Folate    Annual physical exam - Primary    Relevant Orders    CBC with Auto Differential    TSH    Comprehensive Metabolic Panel    Lipid Panel    Vitamin D 25 Hydroxy    Vitamin B12    Folate    Vitamin D deficiency    Relevant Orders    Vitamin D 25 Hydroxy        The diagnoses and plan were discussed with the patient, who verbalizes understanding and agrees with plan.  All questions answered.    Chief Complaint    Chief Complaint   Patient presents with    Annual Exam         HISTORY OF PRESENT ILLNESS    25 y.o. female with depression presents today for her annual physical exam.  Last seen by Camille for knee pain, previously seen for worsening depression.  Started on Buspirone twice a day with Hydroxyzine as needed for sleep/anxiety.  Had to stop Fluoxetine due to bruising.  Notes that her left knee pain went away but now with right hip pain, worse with lifting.  Notes it comes on when running and takes a few hours to go away.  States it does not slow her down too much.  Denies swelling or bruising.  States that she has had some bruising but thinks she ran into something.  Not needing any medicine and goes to the gym four times a week, which helps with her mood.  Notes bloating after she works out.  Notes that she usually drinks 1-2 Coke/Dr Pepper Zero per day.  Sometimes drinks a Celsius.  Denies drinking coffee.  Notes that she is moving to [place] later this summer.    Last Pap - May 2024  Last Mammogram - NA  Last Colonoscopy - NA  Last Skin

## 2025-06-03 NOTE — PATIENT INSTRUCTIONS
IT WAS GREAT TO SEE YOU TODAY!    I WILL CONTACT YOU WITH THE RESULTS OF YOUR LABS.    PLEASE STAY ACTIVE, EAT A HEALTHY DIET, CUT BACK ON CARBONATED BEVERAGES AND WEAR YOUR SEATBELT!    PLEASE CALL WITH [facility] [phone]

## 2025-06-04 ENCOUNTER — RESULTS FOLLOW-UP (OUTPATIENT)
Dept: PRIMARY CARE CLINIC | Facility: CLINIC | Age: 25
End: 2025-06-04

## 2025-06-17 ENCOUNTER — TELEPHONE (OUTPATIENT)
Dept: PRIMARY CARE CLINIC | Facility: CLINIC | Age: 25
End: 2025-06-17

## 2025-06-17 NOTE — TELEPHONE ENCOUNTER
Patient states that she has a feeling on the sides of both legs. Started yesterday behind her knee and is know on top of shin, mid calf. Has not noticed any swelling but has noticed in the last week that she has been bruising very easily.     She looked online and saw that it could be a blood clot and she is worried

## 2025-07-03 PROBLEM — Z00.00 ANNUAL PHYSICAL EXAM: Status: RESOLVED | Noted: 2025-06-03 | Resolved: 2025-07-03
